# Patient Record
Sex: FEMALE | Race: WHITE | NOT HISPANIC OR LATINO | Employment: UNEMPLOYED | URBAN - METROPOLITAN AREA
[De-identification: names, ages, dates, MRNs, and addresses within clinical notes are randomized per-mention and may not be internally consistent; named-entity substitution may affect disease eponyms.]

---

## 2023-03-07 ENCOUNTER — TELEPHONE (OUTPATIENT)
Dept: PRIMARY CARE | Facility: CLINIC | Age: 63
End: 2023-03-07
Payer: COMMERCIAL

## 2023-03-07 DIAGNOSIS — E03.9 ACQUIRED HYPOTHYROIDISM: ICD-10-CM

## 2023-03-07 DIAGNOSIS — M54.31 BILATERAL SCIATICA: Primary | ICD-10-CM

## 2023-03-07 DIAGNOSIS — M54.32 BILATERAL SCIATICA: Primary | ICD-10-CM

## 2023-03-07 RX ORDER — DULOXETIN HYDROCHLORIDE 60 MG/1
60 CAPSULE, DELAYED RELEASE ORAL DAILY
Qty: 30 CAPSULE | Refills: 2 | Status: SHIPPED | OUTPATIENT
Start: 2023-03-07 | End: 2023-06-06

## 2023-03-07 RX ORDER — DULOXETIN HYDROCHLORIDE 60 MG/1
60 CAPSULE, DELAYED RELEASE ORAL DAILY
COMMUNITY
Start: 2022-05-25 | End: 2023-03-07 | Stop reason: SDUPTHER

## 2023-03-07 RX ORDER — LEVOTHYROXINE SODIUM 88 UG/1
88 TABLET ORAL DAILY
COMMUNITY
Start: 2022-05-25 | End: 2023-03-07 | Stop reason: SDUPTHER

## 2023-03-07 RX ORDER — LEVOTHYROXINE SODIUM 88 UG/1
88 TABLET ORAL DAILY
Qty: 90 TABLET | Refills: 3 | Status: SHIPPED | OUTPATIENT
Start: 2023-03-07 | End: 2024-03-22 | Stop reason: SDUPTHER

## 2023-06-06 DIAGNOSIS — M54.32 BILATERAL SCIATICA: ICD-10-CM

## 2023-06-06 DIAGNOSIS — M54.31 BILATERAL SCIATICA: ICD-10-CM

## 2023-06-06 PROBLEM — M51.369 LUMBAR DEGENERATIVE DISC DISEASE: Status: ACTIVE | Noted: 2023-06-06

## 2023-06-06 PROBLEM — S82.409A CLOSED FRACTURE OF SHAFT OF FIBULA: Status: ACTIVE | Noted: 2023-06-06

## 2023-06-06 PROBLEM — M19.049 OSTEOARTHRITIS OF FINGER: Status: ACTIVE | Noted: 2023-06-06

## 2023-06-06 PROBLEM — M54.12 RIGHT CERVICAL RADICULOPATHY: Status: ACTIVE | Noted: 2023-06-06

## 2023-06-06 PROBLEM — R10.9 ABDOMINAL PAIN: Status: ACTIVE | Noted: 2023-06-06

## 2023-06-06 PROBLEM — M70.42 PREPATELLAR BURSITIS OF LEFT KNEE: Status: ACTIVE | Noted: 2023-06-06

## 2023-06-06 PROBLEM — M54.2 CERVICAL PAIN (NECK): Status: ACTIVE | Noted: 2023-06-06

## 2023-06-06 PROBLEM — K56.7 ILEUS (MULTI): Status: ACTIVE | Noted: 2023-06-06

## 2023-06-06 PROBLEM — B00.1 COLD SORE: Status: ACTIVE | Noted: 2023-06-06

## 2023-06-06 PROBLEM — R51.9 HEADACHE: Status: ACTIVE | Noted: 2023-06-06

## 2023-06-06 PROBLEM — S80.11XA: Status: ACTIVE | Noted: 2023-06-06

## 2023-06-06 PROBLEM — E03.9 HYPOTHYROIDISM: Status: ACTIVE | Noted: 2023-06-06

## 2023-06-06 PROBLEM — F41.9 ANXIETY: Status: ACTIVE | Noted: 2023-06-06

## 2023-06-06 PROBLEM — M51.36 LUMBAR DEGENERATIVE DISC DISEASE: Status: ACTIVE | Noted: 2023-06-06

## 2023-06-06 PROBLEM — R63.4 ABNORMAL WEIGHT LOSS: Status: ACTIVE | Noted: 2023-06-06

## 2023-06-06 PROBLEM — M17.9 OA (OSTEOARTHRITIS) OF KNEE: Status: ACTIVE | Noted: 2023-06-06

## 2023-06-06 PROBLEM — K59.00 CONSTIPATION: Status: ACTIVE | Noted: 2023-06-06

## 2023-06-06 PROBLEM — M54.16 RIGHT LUMBAR RADICULOPATHY: Status: ACTIVE | Noted: 2023-06-06

## 2023-06-06 PROBLEM — M47.812 OSTEOARTHRITIS CERVICAL SPINE: Status: ACTIVE | Noted: 2023-06-06

## 2023-06-06 PROBLEM — J01.90 ACUTE SINUSITIS: Status: ACTIVE | Noted: 2023-06-06

## 2023-06-06 RX ORDER — DULOXETIN HYDROCHLORIDE 60 MG/1
CAPSULE, DELAYED RELEASE ORAL
Qty: 30 CAPSULE | Refills: 2 | Status: SHIPPED | OUTPATIENT
Start: 2023-06-06 | End: 2023-09-20 | Stop reason: SDUPTHER

## 2023-06-06 RX ORDER — PREDNISONE 20 MG/1
20 TABLET ORAL 2 TIMES DAILY
Qty: 10 TABLET | Refills: 0 | COMMUNITY
Start: 2022-07-11 | End: 2022-07-16

## 2023-06-06 RX ORDER — OXYCODONE HYDROCHLORIDE 5 MG/1
CAPSULE ORAL EVERY 6 HOURS
COMMUNITY
Start: 2020-08-05 | End: 2024-04-25 | Stop reason: ALTCHOICE

## 2023-06-06 RX ORDER — CYCLOBENZAPRINE HCL 5 MG
TABLET ORAL
COMMUNITY
Start: 2022-07-11 | End: 2024-04-25 | Stop reason: ALTCHOICE

## 2023-06-06 RX ORDER — BUTALBITAL, ACETAMINOPHEN AND CAFFEINE 300; 40; 50 MG/1; MG/1; MG/1
CAPSULE ORAL
COMMUNITY
Start: 2016-11-14 | End: 2024-04-25 | Stop reason: ALTCHOICE

## 2023-06-06 RX ORDER — IBUPROFEN 600 MG/1
TABLET ORAL
COMMUNITY
Start: 2018-12-12

## 2023-06-06 RX ORDER — DOCUSATE SODIUM 100 MG/1
CAPSULE, LIQUID FILLED ORAL 2 TIMES DAILY
COMMUNITY
Start: 2020-08-05

## 2023-06-06 RX ORDER — GABAPENTIN 600 MG/1
TABLET ORAL
COMMUNITY
End: 2023-08-11 | Stop reason: SDUPTHER

## 2023-06-06 RX ORDER — SENNOSIDES 8.6 MG/1
TABLET ORAL
COMMUNITY
Start: 2020-08-05 | End: 2024-04-25 | Stop reason: ALTCHOICE

## 2023-08-11 DIAGNOSIS — M54.16 RIGHT LUMBAR RADICULOPATHY: Primary | ICD-10-CM

## 2023-08-11 RX ORDER — GABAPENTIN 600 MG/1
TABLET ORAL
Qty: 15 TABLET | Refills: 0 | Status: SHIPPED | OUTPATIENT
Start: 2023-08-11 | End: 2023-08-24 | Stop reason: SDUPTHER

## 2023-08-24 DIAGNOSIS — M54.16 RIGHT LUMBAR RADICULOPATHY: ICD-10-CM

## 2023-08-24 RX ORDER — GABAPENTIN 600 MG/1
TABLET ORAL
Qty: 150 TABLET | Refills: 1 | Status: SHIPPED | OUTPATIENT
Start: 2023-08-24 | End: 2023-10-16 | Stop reason: SDUPTHER

## 2023-09-20 DIAGNOSIS — M54.32 BILATERAL SCIATICA: ICD-10-CM

## 2023-09-20 DIAGNOSIS — M54.31 BILATERAL SCIATICA: ICD-10-CM

## 2023-09-20 RX ORDER — DULOXETIN HYDROCHLORIDE 60 MG/1
60 CAPSULE, DELAYED RELEASE ORAL DAILY
Qty: 90 CAPSULE | Refills: 1 | Status: SHIPPED | OUTPATIENT
Start: 2023-09-20 | End: 2024-03-22 | Stop reason: SDUPTHER

## 2023-10-16 DIAGNOSIS — M54.16 RIGHT LUMBAR RADICULOPATHY: ICD-10-CM

## 2023-10-16 RX ORDER — GABAPENTIN 600 MG/1
TABLET ORAL
Qty: 150 TABLET | Refills: 1 | Status: SHIPPED | OUTPATIENT
Start: 2023-10-16 | End: 2023-12-11 | Stop reason: SDUPTHER

## 2023-12-11 DIAGNOSIS — M54.16 RIGHT LUMBAR RADICULOPATHY: ICD-10-CM

## 2023-12-11 RX ORDER — GABAPENTIN 600 MG/1
TABLET ORAL
Qty: 150 TABLET | Refills: 1 | Status: SHIPPED | OUTPATIENT
Start: 2023-12-11 | End: 2024-02-08 | Stop reason: SDUPTHER

## 2024-02-08 DIAGNOSIS — M54.16 RIGHT LUMBAR RADICULOPATHY: ICD-10-CM

## 2024-02-09 RX ORDER — GABAPENTIN 600 MG/1
TABLET ORAL
Qty: 150 TABLET | Refills: 1 | Status: SHIPPED | OUTPATIENT
Start: 2024-02-09 | End: 2024-04-10 | Stop reason: SDUPTHER

## 2024-03-22 ENCOUNTER — TELEPHONE (OUTPATIENT)
Dept: PRIMARY CARE | Facility: CLINIC | Age: 64
End: 2024-03-22
Payer: COMMERCIAL

## 2024-03-22 DIAGNOSIS — M54.31 BILATERAL SCIATICA: ICD-10-CM

## 2024-03-22 DIAGNOSIS — M54.32 BILATERAL SCIATICA: ICD-10-CM

## 2024-03-22 DIAGNOSIS — E03.9 ACQUIRED HYPOTHYROIDISM: ICD-10-CM

## 2024-03-22 RX ORDER — DULOXETIN HYDROCHLORIDE 60 MG/1
60 CAPSULE, DELAYED RELEASE ORAL DAILY
Qty: 90 CAPSULE | Refills: 0 | Status: SHIPPED | OUTPATIENT
Start: 2024-03-22 | End: 2025-03-22

## 2024-03-22 RX ORDER — LEVOTHYROXINE SODIUM 88 UG/1
88 TABLET ORAL DAILY
Qty: 90 TABLET | Refills: 0 | Status: SHIPPED | OUTPATIENT
Start: 2024-03-22 | End: 2024-04-26 | Stop reason: SDUPTHER

## 2024-03-22 NOTE — TELEPHONE ENCOUNTER
I refilled the two meds. However pt has no showed with use multiple times, and has not plan to see us again. No further refills after the 90 days. Pt will need to find a new doctor

## 2024-04-10 DIAGNOSIS — M54.16 RIGHT LUMBAR RADICULOPATHY: ICD-10-CM

## 2024-04-11 RX ORDER — GABAPENTIN 600 MG/1
TABLET ORAL
Qty: 150 TABLET | Refills: 0 | Status: SHIPPED | OUTPATIENT
Start: 2024-04-11 | End: 2024-05-09 | Stop reason: SDUPTHER

## 2024-04-25 ENCOUNTER — LAB (OUTPATIENT)
Dept: LAB | Facility: LAB | Age: 64
End: 2024-04-25
Payer: MEDICAID

## 2024-04-25 ENCOUNTER — OFFICE VISIT (OUTPATIENT)
Dept: PRIMARY CARE | Facility: CLINIC | Age: 64
End: 2024-04-25
Payer: MEDICAID

## 2024-04-25 VITALS
TEMPERATURE: 97.7 F | OXYGEN SATURATION: 96 % | SYSTOLIC BLOOD PRESSURE: 134 MMHG | BODY MASS INDEX: 19.93 KG/M2 | HEART RATE: 78 BPM | WEIGHT: 124 LBS | DIASTOLIC BLOOD PRESSURE: 68 MMHG | HEIGHT: 66 IN

## 2024-04-25 DIAGNOSIS — E03.9 HYPOTHYROIDISM, UNSPECIFIED TYPE: ICD-10-CM

## 2024-04-25 DIAGNOSIS — Z00.00 PHYSICAL EXAM: ICD-10-CM

## 2024-04-25 DIAGNOSIS — Z12.31 VISIT FOR SCREENING MAMMOGRAM: ICD-10-CM

## 2024-04-25 DIAGNOSIS — Z12.2 ENCOUNTER FOR SCREENING FOR LUNG CANCER: ICD-10-CM

## 2024-04-25 DIAGNOSIS — Z12.31 ENCOUNTER FOR SCREENING MAMMOGRAM FOR MALIGNANT NEOPLASM OF BREAST: ICD-10-CM

## 2024-04-25 DIAGNOSIS — R74.8 ELEVATED ALKALINE PHOSPHATASE LEVEL: ICD-10-CM

## 2024-04-25 DIAGNOSIS — R77.9 ELEVATED BLOOD PROTEIN: ICD-10-CM

## 2024-04-25 DIAGNOSIS — Z12.11 SCREENING FOR COLON CANCER: ICD-10-CM

## 2024-04-25 DIAGNOSIS — Z00.00 PHYSICAL EXAM: Primary | ICD-10-CM

## 2024-04-25 DIAGNOSIS — F17.210 HEAVY CIGARETTE SMOKER: ICD-10-CM

## 2024-04-25 DIAGNOSIS — K31.89 GASTRIC WALL THICKENING: ICD-10-CM

## 2024-04-25 PROBLEM — F43.23 ADJUSTMENT DISORDER WITH MIXED ANXIETY AND DEPRESSED MOOD: Status: ACTIVE | Noted: 2023-08-15

## 2024-04-25 PROCEDURE — 84439 ASSAY OF FREE THYROXINE: CPT

## 2024-04-25 PROCEDURE — 36415 COLL VENOUS BLD VENIPUNCTURE: CPT

## 2024-04-25 PROCEDURE — 84165 PROTEIN E-PHORESIS SERUM: CPT

## 2024-04-25 PROCEDURE — 84165 PROTEIN E-PHORESIS SERUM: CPT | Performed by: INTERNAL MEDICINE

## 2024-04-25 PROCEDURE — 1159F MED LIST DOCD IN RCRD: CPT | Performed by: INTERNAL MEDICINE

## 2024-04-25 PROCEDURE — 84443 ASSAY THYROID STIM HORMONE: CPT

## 2024-04-25 PROCEDURE — 86334 IMMUNOFIX E-PHORESIS SERUM: CPT

## 2024-04-25 PROCEDURE — 1123F ACP DISCUSS/DSCN MKR DOCD: CPT | Performed by: INTERNAL MEDICINE

## 2024-04-25 PROCEDURE — 83036 HEMOGLOBIN GLYCOSYLATED A1C: CPT

## 2024-04-25 PROCEDURE — 1158F ADVNC CARE PLAN TLK DOCD: CPT | Performed by: INTERNAL MEDICINE

## 2024-04-25 PROCEDURE — 80053 COMPREHEN METABOLIC PANEL: CPT

## 2024-04-25 PROCEDURE — 1157F ADVNC CARE PLAN IN RCRD: CPT | Performed by: INTERNAL MEDICINE

## 2024-04-25 PROCEDURE — 99214 OFFICE O/P EST MOD 30 MIN: CPT | Performed by: INTERNAL MEDICINE

## 2024-04-25 PROCEDURE — 85027 COMPLETE CBC AUTOMATED: CPT

## 2024-04-25 PROCEDURE — 86320 SERUM IMMUNOELECTROPHORESIS: CPT | Performed by: INTERNAL MEDICINE

## 2024-04-25 NOTE — PROGRESS NOTES
"SUBJECTIVE:   Barbara Sow is a 64 y.o. female presenting for her annual physical/wellness.  PCP: Maninder Christensen MD  Physical.  Overdue for Follow up and for labs.  Since a cold and sinus infection that occurred 6 weeks ago, she has been feeling short of breath. No chest pain. No leg edema.  She is a chronic on and off smoker. Over 20 pack years. Currently smoking half pack per day.  Last year when she was in ER, had ct abdomen that showed gastric thickness. She has not had any Follow up test.  Due for Colonoscopy.   Last pap: na  Last mammogram: due  Vaccines pt has medicaid, we cannot give any vaccines.  Diet:   healthy in general  Exercises:  regularly  No Weight loss. The weight in 2022 was in accurate per pt. She is usually in 120 lb.  Denied loss of appetite. No diarrhea, not UTI symptoms. No black stool or rectal bleeding.    No results found for: \"HGBA1C\"  Lab Results   Component Value Date    CREATININE 0.64 05/21/2023     Lab Results   Component Value Date    WBC 10.9 05/21/2023    HGB 11.6 (L) 05/21/2023    HCT 37.0 05/21/2023    MCV 90 05/21/2023     (H) 05/21/2023     Lab Results   Component Value Date    CHOL 241 (H) 08/23/2022     Lab Results   Component Value Date    HDL 36.8 (A) 08/23/2022     No results found for: \"LDLCALC\"  No results found for: \"TRIG\"  No components found for: \"CHOLHDL\"       ROS:   otherwise Feeling well. No dyspnea or chest pain on exertion. No abdominal pain, change in bowel habits, black or bloody stools. No urinary tract or  symptoms.  No breast concerns. No neurological complaints.    OBJECTIVE:   The patient appears well, alert, oriented x 3, in no distress.   /68   Pulse 78   Temp 36.5 °C (97.7 °F)   Ht 1.676 m (5' 6\")   Wt 56.2 kg (124 lb)   SpO2 96%   BMI 20.01 kg/m²   ENT normal.  Neck supple. No adenopathy or thyromegaly. SID. Lungs are clear, good air entry, no wheezes, rhonchi or rales. S1 and S2 normal, no murmurs, regular rate and " rhythm. Abdomen is soft without tenderness, guarding, mass or organomegaly.  exam deferred to Gyn. Extremities show no edema, normal peripheral pulses. Neurological is normal without focal findings.      1. Physical exam  CBC, Comprehensive Metabolic Panel, Hemoglobin A1C      2. Visit for screening mammogram        3. Gastric wall thickening  EGD      4. Encounter for screening mammogram for malignant neoplasm of breast  BI mammo bilateral screening tomosynthesis      5. Screening for colon cancer  Colonoscopy Screening; Average Risk Patient      6. Heavy cigarette smoker  CT lung screening low dose      7. Encounter for screening for lung cancer  CT lung screening low dose      8. Hypothyroidism, unspecified type  TSH with reflex to Free T4 if abnormal        Follow up 6 months

## 2024-04-26 DIAGNOSIS — E03.9 ACQUIRED HYPOTHYROIDISM: ICD-10-CM

## 2024-04-26 DIAGNOSIS — R74.8 ELEVATED ALKALINE PHOSPHATASE LEVEL: Primary | ICD-10-CM

## 2024-04-26 DIAGNOSIS — R77.9 ELEVATED BLOOD PROTEIN: ICD-10-CM

## 2024-04-26 LAB
ALBUMIN SERPL BCP-MCNC: 4.8 G/DL (ref 3.4–5)
ALP SERPL-CCNC: 144 U/L (ref 33–136)
ALT SERPL W P-5'-P-CCNC: 22 U/L (ref 7–45)
ANION GAP SERPL CALC-SCNC: 19 MMOL/L (ref 10–20)
AST SERPL W P-5'-P-CCNC: 17 U/L (ref 9–39)
BILIRUB SERPL-MCNC: 0.3 MG/DL (ref 0–1.2)
BUN SERPL-MCNC: 17 MG/DL (ref 6–23)
CALCIUM SERPL-MCNC: 10.2 MG/DL (ref 8.6–10.6)
CHLORIDE SERPL-SCNC: 101 MMOL/L (ref 98–107)
CO2 SERPL-SCNC: 23 MMOL/L (ref 21–32)
CREAT SERPL-MCNC: 0.7 MG/DL (ref 0.5–1.05)
EGFRCR SERPLBLD CKD-EPI 2021: >90 ML/MIN/1.73M*2
ERYTHROCYTE [DISTWIDTH] IN BLOOD BY AUTOMATED COUNT: 18.4 % (ref 11.5–14.5)
EST. AVERAGE GLUCOSE BLD GHB EST-MCNC: 114 MG/DL
GLUCOSE SERPL-MCNC: 84 MG/DL (ref 74–99)
HBA1C MFR BLD: 5.6 %
HCT VFR BLD AUTO: 41.4 % (ref 36–46)
HGB BLD-MCNC: 13.3 G/DL (ref 12–16)
MCH RBC QN AUTO: 30.9 PG (ref 26–34)
MCHC RBC AUTO-ENTMCNC: 32.1 G/DL (ref 32–36)
MCV RBC AUTO: 96 FL (ref 80–100)
NRBC BLD-RTO: 0 /100 WBCS (ref 0–0)
PLATELET # BLD AUTO: 551 X10*3/UL (ref 150–450)
POTASSIUM SERPL-SCNC: 4.4 MMOL/L (ref 3.5–5.3)
PROT SERPL-MCNC: 8.6 G/DL (ref 6.4–8.2)
PROT SERPL-MCNC: 9 G/DL (ref 6.4–8.2)
RBC # BLD AUTO: 4.31 X10*6/UL (ref 4–5.2)
SODIUM SERPL-SCNC: 139 MMOL/L (ref 136–145)
T4 FREE SERPL-MCNC: 1.06 NG/DL (ref 0.78–1.48)
TSH SERPL-ACNC: 17.82 MIU/L (ref 0.44–3.98)
WBC # BLD AUTO: 9 X10*3/UL (ref 4.4–11.3)

## 2024-04-26 RX ORDER — LEVOTHYROXINE SODIUM 88 UG/1
TABLET ORAL
Start: 2024-04-26

## 2024-04-26 NOTE — PROGRESS NOTES
Added Alk phos fractions due to elevated alk phos  Added SPEP due to elevated total protein.  Adjusted levothyroxine dose, 88mcg 7tabs to day, to 8 tabs per day, repeat tsh in 3 months

## 2024-05-02 LAB
ALBUMIN: 4.6 G/DL (ref 3.4–5)
ALPHA 1 GLOBULIN: 0.4 G/DL (ref 0.2–0.6)
ALPHA 2 GLOBULIN: 1.1 G/DL (ref 0.4–1.1)
BETA GLOBULIN: 0.9 G/DL (ref 0.5–1.2)
GAMMA GLOBULIN: 1.6 G/DL (ref 0.5–1.4)
IMMUNOFIXATION COMMENT: NORMAL
M-PROTEIN 1: 0.7 G/DL
PATH REVIEW - SERUM IMMUNOFIXATION: NORMAL
PATH REVIEW-SERUM PROTEIN ELECTROPHORESIS: ABNORMAL
PROTEIN ELECTROPHORESIS COMMENT: ABNORMAL

## 2024-05-09 DIAGNOSIS — M54.16 RIGHT LUMBAR RADICULOPATHY: ICD-10-CM

## 2024-05-09 RX ORDER — GABAPENTIN 600 MG/1
TABLET ORAL
Qty: 150 TABLET | Refills: 11 | Status: SHIPPED | OUTPATIENT
Start: 2024-05-09

## 2024-05-10 DIAGNOSIS — D75.839 THROMBOCYTOSIS: ICD-10-CM

## 2024-05-10 DIAGNOSIS — D64.9 ANEMIA, UNSPECIFIED TYPE: ICD-10-CM

## 2024-05-10 DIAGNOSIS — R77.8 ABNORMAL SERUM PROTEIN ELECTROPHORESIS: Primary | ICD-10-CM

## 2024-07-08 DIAGNOSIS — M54.31 BILATERAL SCIATICA: ICD-10-CM

## 2024-07-08 DIAGNOSIS — M54.32 BILATERAL SCIATICA: ICD-10-CM

## 2024-07-08 DIAGNOSIS — E03.9 ACQUIRED HYPOTHYROIDISM: ICD-10-CM

## 2024-07-08 RX ORDER — DULOXETIN HYDROCHLORIDE 60 MG/1
60 CAPSULE, DELAYED RELEASE ORAL DAILY
Qty: 90 CAPSULE | Refills: 0 | Status: SHIPPED | OUTPATIENT
Start: 2024-07-08 | End: 2025-07-08

## 2024-07-08 RX ORDER — LEVOTHYROXINE SODIUM 88 UG/1
TABLET ORAL
Qty: 90 TABLET | Refills: 1 | Status: SHIPPED | OUTPATIENT
Start: 2024-07-08

## 2024-07-18 ENCOUNTER — HOSPITAL ENCOUNTER (INPATIENT)
Facility: HOSPITAL | Age: 64
End: 2024-07-18
Attending: EMERGENCY MEDICINE | Admitting: SURGERY
Payer: MEDICARE

## 2024-07-18 ENCOUNTER — APPOINTMENT (OUTPATIENT)
Dept: RADIOLOGY | Facility: HOSPITAL | Age: 64
End: 2024-07-18
Payer: MEDICARE

## 2024-07-18 ENCOUNTER — APPOINTMENT (OUTPATIENT)
Dept: CARDIOLOGY | Facility: HOSPITAL | Age: 64
End: 2024-07-18
Payer: MEDICARE

## 2024-07-18 DIAGNOSIS — K56.2 INTESTINAL VOLVULUS (MULTI): ICD-10-CM

## 2024-07-18 DIAGNOSIS — K56.609 LARGE BOWEL OBSTRUCTION (MULTI): Primary | ICD-10-CM

## 2024-07-18 DIAGNOSIS — Z74.09 MOBILITY IMPAIRED: ICD-10-CM

## 2024-07-18 DIAGNOSIS — R10.9 ABDOMINAL PAIN: ICD-10-CM

## 2024-07-18 LAB
ALBUMIN SERPL BCP-MCNC: 4.3 G/DL (ref 3.4–5)
ALP SERPL-CCNC: 100 U/L (ref 33–136)
ALT SERPL W P-5'-P-CCNC: 14 U/L (ref 7–45)
ANION GAP SERPL CALC-SCNC: 15 MMOL/L (ref 10–20)
ANION GAP SERPL CALC-SCNC: 17 MMOL/L (ref 10–20)
APPEARANCE UR: CLEAR
APPEARANCE UR: CLEAR
AST SERPL W P-5'-P-CCNC: 37 U/L (ref 9–39)
BASOPHILS # BLD AUTO: 0.21 X10*3/UL (ref 0–0.1)
BASOPHILS # BLD AUTO: 0.23 X10*3/UL (ref 0–0.1)
BASOPHILS NFR BLD AUTO: 1.8 %
BASOPHILS NFR BLD AUTO: 1.9 %
BILIRUB SERPL-MCNC: 0.3 MG/DL (ref 0–1.2)
BILIRUB UR STRIP.AUTO-MCNC: NEGATIVE MG/DL
BILIRUB UR STRIP.AUTO-MCNC: NEGATIVE MG/DL
BUN SERPL-MCNC: 23 MG/DL (ref 6–23)
BUN SERPL-MCNC: 25 MG/DL (ref 6–23)
CALCIUM SERPL-MCNC: 9.5 MG/DL (ref 8.6–10.3)
CALCIUM SERPL-MCNC: 9.8 MG/DL (ref 8.6–10.3)
CARDIAC TROPONIN I PNL SERPL HS: 6 NG/L (ref 0–13)
CHLORIDE SERPL-SCNC: 96 MMOL/L (ref 98–107)
CHLORIDE SERPL-SCNC: 98 MMOL/L (ref 98–107)
CO2 SERPL-SCNC: 21 MMOL/L (ref 21–32)
CO2 SERPL-SCNC: 21 MMOL/L (ref 21–32)
COLOR UR: YELLOW
COLOR UR: YELLOW
CREAT SERPL-MCNC: 0.7 MG/DL (ref 0.5–1.05)
CREAT SERPL-MCNC: 0.94 MG/DL (ref 0.5–1.05)
EGFRCR SERPLBLD CKD-EPI 2021: 67 ML/MIN/1.73M*2
EGFRCR SERPLBLD CKD-EPI 2021: >90 ML/MIN/1.73M*2
EOSINOPHIL # BLD AUTO: 0.58 X10*3/UL (ref 0–0.7)
EOSINOPHIL # BLD AUTO: 0.64 X10*3/UL (ref 0–0.7)
EOSINOPHIL NFR BLD AUTO: 4.9 %
EOSINOPHIL NFR BLD AUTO: 5.3 %
ERYTHROCYTE [DISTWIDTH] IN BLOOD BY AUTOMATED COUNT: 15 % (ref 11.5–14.5)
ERYTHROCYTE [DISTWIDTH] IN BLOOD BY AUTOMATED COUNT: 15 % (ref 11.5–14.5)
GLUCOSE SERPL-MCNC: 73 MG/DL (ref 74–99)
GLUCOSE SERPL-MCNC: 83 MG/DL (ref 74–99)
GLUCOSE UR STRIP.AUTO-MCNC: NORMAL MG/DL
GLUCOSE UR STRIP.AUTO-MCNC: NORMAL MG/DL
HCT VFR BLD AUTO: 34.8 % (ref 36–46)
HCT VFR BLD AUTO: 37 % (ref 36–46)
HGB BLD-MCNC: 11.2 G/DL (ref 12–16)
HGB BLD-MCNC: 11.8 G/DL (ref 12–16)
HOLD SPECIMEN: NORMAL
HYALINE CASTS #/AREA URNS AUTO: ABNORMAL /LPF
IMM GRANULOCYTES # BLD AUTO: 0.04 X10*3/UL (ref 0–0.7)
IMM GRANULOCYTES # BLD AUTO: 0.04 X10*3/UL (ref 0–0.7)
IMM GRANULOCYTES NFR BLD AUTO: 0.3 % (ref 0–0.9)
IMM GRANULOCYTES NFR BLD AUTO: 0.3 % (ref 0–0.9)
KETONES UR STRIP.AUTO-MCNC: ABNORMAL MG/DL
KETONES UR STRIP.AUTO-MCNC: ABNORMAL MG/DL
LACTATE SERPL-SCNC: 1 MMOL/L (ref 0.4–2)
LEUKOCYTE ESTERASE UR QL STRIP.AUTO: ABNORMAL
LEUKOCYTE ESTERASE UR QL STRIP.AUTO: NEGATIVE
LIPASE SERPL-CCNC: 11 U/L (ref 9–82)
LYMPHOCYTES # BLD AUTO: 1.91 X10*3/UL (ref 1.2–4.8)
LYMPHOCYTES # BLD AUTO: 1.95 X10*3/UL (ref 1.2–4.8)
LYMPHOCYTES NFR BLD AUTO: 15.8 %
LYMPHOCYTES NFR BLD AUTO: 16.6 %
MAGNESIUM SERPL-MCNC: 2.07 MG/DL (ref 1.6–2.4)
MAGNESIUM SERPL-MCNC: 2.39 MG/DL (ref 1.6–2.4)
MCH RBC QN AUTO: 29.1 PG (ref 26–34)
MCH RBC QN AUTO: 29.8 PG (ref 26–34)
MCHC RBC AUTO-ENTMCNC: 31.9 G/DL (ref 32–36)
MCHC RBC AUTO-ENTMCNC: 32.2 G/DL (ref 32–36)
MCV RBC AUTO: 91 FL (ref 80–100)
MCV RBC AUTO: 93 FL (ref 80–100)
MONOCYTES # BLD AUTO: 0.54 X10*3/UL (ref 0.1–1)
MONOCYTES # BLD AUTO: 0.66 X10*3/UL (ref 0.1–1)
MONOCYTES NFR BLD AUTO: 4.6 %
MONOCYTES NFR BLD AUTO: 5.5 %
MUCOUS THREADS #/AREA URNS AUTO: ABNORMAL /LPF
MUCOUS THREADS #/AREA URNS AUTO: NORMAL /LPF
NEUTROPHILS # BLD AUTO: 8.4 X10*3/UL (ref 1.2–7.7)
NEUTROPHILS # BLD AUTO: 8.6 X10*3/UL (ref 1.2–7.7)
NEUTROPHILS NFR BLD AUTO: 71.2 %
NEUTROPHILS NFR BLD AUTO: 71.8 %
NITRITE UR QL STRIP.AUTO: NEGATIVE
NITRITE UR QL STRIP.AUTO: NEGATIVE
NRBC BLD-RTO: 0 /100 WBCS (ref 0–0)
NRBC BLD-RTO: 0 /100 WBCS (ref 0–0)
PH UR STRIP.AUTO: 5.5 [PH]
PH UR STRIP.AUTO: 5.5 [PH]
PHOSPHATE SERPL-MCNC: 4.4 MG/DL (ref 2.5–4.9)
PLATELET # BLD AUTO: 623 X10*3/UL (ref 150–450)
PLATELET # BLD AUTO: 660 X10*3/UL (ref 150–450)
POTASSIUM SERPL-SCNC: 4.1 MMOL/L (ref 3.5–5.3)
POTASSIUM SERPL-SCNC: 5.8 MMOL/L (ref 3.5–5.3)
PROT SERPL-MCNC: 7.8 G/DL (ref 6.4–8.2)
PROT UR STRIP.AUTO-MCNC: ABNORMAL MG/DL
PROT UR STRIP.AUTO-MCNC: ABNORMAL MG/DL
RBC # BLD AUTO: 3.76 X10*6/UL (ref 4–5.2)
RBC # BLD AUTO: 4.05 X10*6/UL (ref 4–5.2)
RBC # UR STRIP.AUTO: NEGATIVE /UL
RBC # UR STRIP.AUTO: NEGATIVE /UL
RBC #/AREA URNS AUTO: ABNORMAL /HPF
RBC #/AREA URNS AUTO: NORMAL /HPF
SODIUM SERPL-SCNC: 128 MMOL/L (ref 136–145)
SODIUM SERPL-SCNC: 130 MMOL/L (ref 136–145)
SP GR UR STRIP.AUTO: 1.02
SP GR UR STRIP.AUTO: 1.03
SQUAMOUS #/AREA URNS AUTO: ABNORMAL /HPF
SQUAMOUS #/AREA URNS AUTO: NORMAL /HPF
T4 FREE SERPL-MCNC: 0.38 NG/DL (ref 0.61–1.12)
TSH SERPL-ACNC: 50 MIU/L (ref 0.44–3.98)
UROBILINOGEN UR STRIP.AUTO-MCNC: ABNORMAL MG/DL
UROBILINOGEN UR STRIP.AUTO-MCNC: NORMAL MG/DL
WBC # BLD AUTO: 11.7 X10*3/UL (ref 4.4–11.3)
WBC # BLD AUTO: 12.1 X10*3/UL (ref 4.4–11.3)
WBC #/AREA URNS AUTO: ABNORMAL /HPF
WBC #/AREA URNS AUTO: NORMAL /HPF

## 2024-07-18 PROCEDURE — 99222 1ST HOSP IP/OBS MODERATE 55: CPT | Performed by: NURSE PRACTITIONER

## 2024-07-18 PROCEDURE — 2500000001 HC RX 250 WO HCPCS SELF ADMINISTERED DRUGS (ALT 637 FOR MEDICARE OP): Performed by: SURGERY

## 2024-07-18 PROCEDURE — 85025 COMPLETE CBC W/AUTO DIFF WBC: CPT | Performed by: PHYSICIAN ASSISTANT

## 2024-07-18 PROCEDURE — 85025 COMPLETE CBC W/AUTO DIFF WBC: CPT | Performed by: SURGERY

## 2024-07-18 PROCEDURE — 99223 1ST HOSP IP/OBS HIGH 75: CPT | Performed by: SURGERY

## 2024-07-18 PROCEDURE — 84439 ASSAY OF FREE THYROXINE: CPT | Performed by: SURGERY

## 2024-07-18 PROCEDURE — 84484 ASSAY OF TROPONIN QUANT: CPT | Performed by: PHYSICIAN ASSISTANT

## 2024-07-18 PROCEDURE — 84100 ASSAY OF PHOSPHORUS: CPT | Performed by: SURGERY

## 2024-07-18 PROCEDURE — 2500000004 HC RX 250 GENERAL PHARMACY W/ HCPCS (ALT 636 FOR OP/ED): Performed by: PHYSICIAN ASSISTANT

## 2024-07-18 PROCEDURE — 81001 URINALYSIS AUTO W/SCOPE: CPT | Performed by: SURGERY

## 2024-07-18 PROCEDURE — 93005 ELECTROCARDIOGRAM TRACING: CPT

## 2024-07-18 PROCEDURE — 2060000001 HC INTERMEDIATE ICU ROOM DAILY

## 2024-07-18 PROCEDURE — 84075 ASSAY ALKALINE PHOSPHATASE: CPT | Performed by: PHYSICIAN ASSISTANT

## 2024-07-18 PROCEDURE — 83690 ASSAY OF LIPASE: CPT | Performed by: PHYSICIAN ASSISTANT

## 2024-07-18 PROCEDURE — 2500000004 HC RX 250 GENERAL PHARMACY W/ HCPCS (ALT 636 FOR OP/ED): Performed by: SURGERY

## 2024-07-18 PROCEDURE — 96375 TX/PRO/DX INJ NEW DRUG ADDON: CPT

## 2024-07-18 PROCEDURE — 84443 ASSAY THYROID STIM HORMONE: CPT | Performed by: SURGERY

## 2024-07-18 PROCEDURE — 83735 ASSAY OF MAGNESIUM: CPT | Performed by: NURSE PRACTITIONER

## 2024-07-18 PROCEDURE — 81001 URINALYSIS AUTO W/SCOPE: CPT | Performed by: PHYSICIAN ASSISTANT

## 2024-07-18 PROCEDURE — 74177 CT ABD & PELVIS W/CONTRAST: CPT | Performed by: RADIOLOGY

## 2024-07-18 PROCEDURE — 99285 EMERGENCY DEPT VISIT HI MDM: CPT | Mod: 25

## 2024-07-18 PROCEDURE — 2500000004 HC RX 250 GENERAL PHARMACY W/ HCPCS (ALT 636 FOR OP/ED): Performed by: EMERGENCY MEDICINE

## 2024-07-18 PROCEDURE — 36415 COLL VENOUS BLD VENIPUNCTURE: CPT | Performed by: SURGERY

## 2024-07-18 PROCEDURE — 83605 ASSAY OF LACTIC ACID: CPT | Performed by: PHYSICIAN ASSISTANT

## 2024-07-18 PROCEDURE — 96361 HYDRATE IV INFUSION ADD-ON: CPT

## 2024-07-18 PROCEDURE — 2550000001 HC RX 255 CONTRASTS: Performed by: EMERGENCY MEDICINE

## 2024-07-18 PROCEDURE — 74177 CT ABD & PELVIS W/CONTRAST: CPT

## 2024-07-18 PROCEDURE — 80048 BASIC METABOLIC PNL TOTAL CA: CPT | Mod: CCI | Performed by: SURGERY

## 2024-07-18 PROCEDURE — 96376 TX/PRO/DX INJ SAME DRUG ADON: CPT

## 2024-07-18 PROCEDURE — 87086 URINE CULTURE/COLONY COUNT: CPT | Mod: PORLAB | Performed by: PHYSICIAN ASSISTANT

## 2024-07-18 PROCEDURE — 36415 COLL VENOUS BLD VENIPUNCTURE: CPT | Performed by: PHYSICIAN ASSISTANT

## 2024-07-18 PROCEDURE — 83735 ASSAY OF MAGNESIUM: CPT | Performed by: SURGERY

## 2024-07-18 PROCEDURE — 96374 THER/PROPH/DIAG INJ IV PUSH: CPT | Mod: 59

## 2024-07-18 PROCEDURE — 2500000001 HC RX 250 WO HCPCS SELF ADMINISTERED DRUGS (ALT 637 FOR MEDICARE OP): Performed by: NURSE PRACTITIONER

## 2024-07-18 RX ORDER — IBUPROFEN 200 MG
1 TABLET ORAL DAILY
Status: DISCONTINUED | OUTPATIENT
Start: 2024-07-18 | End: 2024-07-25 | Stop reason: HOSPADM

## 2024-07-18 RX ORDER — NICOTINE 7MG/24HR
1 PATCH, TRANSDERMAL 24 HOURS TRANSDERMAL DAILY
Status: DISCONTINUED | OUTPATIENT
Start: 2024-09-12 | End: 2024-07-25 | Stop reason: HOSPADM

## 2024-07-18 RX ORDER — GABAPENTIN 600 MG/1
1200 TABLET ORAL NIGHTLY
Status: DISCONTINUED | OUTPATIENT
Start: 2024-07-18 | End: 2024-07-19

## 2024-07-18 RX ORDER — ONDANSETRON HYDROCHLORIDE 2 MG/ML
4 INJECTION, SOLUTION INTRAVENOUS EVERY 8 HOURS PRN
Status: DISCONTINUED | OUTPATIENT
Start: 2024-07-18 | End: 2024-07-25 | Stop reason: HOSPADM

## 2024-07-18 RX ORDER — IBUPROFEN 200 MG
1 TABLET ORAL DAILY
Status: DISCONTINUED | OUTPATIENT
Start: 2024-08-29 | End: 2024-07-25 | Stop reason: HOSPADM

## 2024-07-18 RX ORDER — MORPHINE SULFATE 4 MG/ML
4 INJECTION INTRAVENOUS ONCE
Status: COMPLETED | OUTPATIENT
Start: 2024-07-18 | End: 2024-07-18

## 2024-07-18 RX ORDER — GABAPENTIN 600 MG/1
TABLET ORAL
COMMUNITY
End: 2024-08-01 | Stop reason: SDUPTHER

## 2024-07-18 RX ORDER — GABAPENTIN 600 MG/1
1200 TABLET ORAL DAILY
Status: DISCONTINUED | OUTPATIENT
Start: 2024-07-18 | End: 2024-07-19

## 2024-07-18 RX ORDER — MORPHINE SULFATE 4 MG/ML
4 INJECTION INTRAVENOUS EVERY 6 HOURS PRN
Status: DISCONTINUED | OUTPATIENT
Start: 2024-07-18 | End: 2024-07-19

## 2024-07-18 RX ORDER — LEVOTHYROXINE SODIUM 88 UG/1
88 TABLET ORAL DAILY
Status: DISCONTINUED | OUTPATIENT
Start: 2024-07-19 | End: 2024-07-19

## 2024-07-18 RX ORDER — LEVOTHYROXINE SODIUM 88 UG/1
88 TABLET ORAL DAILY
COMMUNITY
End: 2024-08-01 | Stop reason: SDUPTHER

## 2024-07-18 RX ORDER — DEXTROMETHORPHAN POLISTIREX 30 MG/5 ML
1 SUSPENSION, EXTENDED RELEASE 12 HR ORAL ONCE
Status: COMPLETED | OUTPATIENT
Start: 2024-07-18 | End: 2024-07-18

## 2024-07-18 RX ORDER — GLYCERIN 1 G/1
1 SUPPOSITORY RECTAL ONCE
Status: COMPLETED | OUTPATIENT
Start: 2024-07-18 | End: 2024-07-18

## 2024-07-18 RX ORDER — DULOXETIN HYDROCHLORIDE 60 MG/1
60 CAPSULE, DELAYED RELEASE ORAL DAILY
COMMUNITY

## 2024-07-18 RX ORDER — ONDANSETRON HYDROCHLORIDE 2 MG/ML
4 INJECTION, SOLUTION INTRAVENOUS ONCE
Status: COMPLETED | OUTPATIENT
Start: 2024-07-18 | End: 2024-07-18

## 2024-07-18 RX ORDER — GABAPENTIN 600 MG/1
600 TABLET ORAL 2 TIMES DAILY
Status: DISCONTINUED | OUTPATIENT
Start: 2024-07-18 | End: 2024-07-18

## 2024-07-18 RX ORDER — MORPHINE SULFATE 2 MG/ML
2 INJECTION, SOLUTION INTRAMUSCULAR; INTRAVENOUS EVERY 6 HOURS PRN
Status: DISCONTINUED | OUTPATIENT
Start: 2024-07-18 | End: 2024-07-19

## 2024-07-18 RX ORDER — DEXTROSE MONOHYDRATE AND SODIUM CHLORIDE 5; .9 G/100ML; G/100ML
100 INJECTION, SOLUTION INTRAVENOUS CONTINUOUS
Status: DISCONTINUED | OUTPATIENT
Start: 2024-07-18 | End: 2024-07-20

## 2024-07-18 RX ORDER — DEXTROMETHORPHAN POLISTIREX 30 MG/5 ML
1 SUSPENSION, EXTENDED RELEASE 12 HR ORAL ONCE
Status: DISCONTINUED | OUTPATIENT
Start: 2024-07-18 | End: 2024-07-18

## 2024-07-18 RX ORDER — DULOXETIN HYDROCHLORIDE 30 MG/1
60 CAPSULE, DELAYED RELEASE ORAL DAILY
Status: DISCONTINUED | OUTPATIENT
Start: 2024-07-18 | End: 2024-07-19

## 2024-07-18 RX ORDER — GLYCERIN 1 G/1
1 SUPPOSITORY RECTAL DAILY PRN
Status: DISCONTINUED | OUTPATIENT
Start: 2024-07-18 | End: 2024-07-18

## 2024-07-18 RX ORDER — POLYETHYLENE GLYCOL 3350, SODIUM CHLORIDE, SODIUM BICARBONATE, POTASSIUM CHLORIDE 420; 11.2; 5.72; 1.48 G/4L; G/4L; G/4L; G/4L
4000 POWDER, FOR SOLUTION ORAL ONCE
Status: COMPLETED | OUTPATIENT
Start: 2024-07-18 | End: 2024-07-18

## 2024-07-18 RX ORDER — ENOXAPARIN SODIUM 100 MG/ML
40 INJECTION SUBCUTANEOUS EVERY 24 HOURS
Status: DISCONTINUED | OUTPATIENT
Start: 2024-07-18 | End: 2024-07-25 | Stop reason: HOSPADM

## 2024-07-18 RX ORDER — POLYETHYLENE GLYCOL 3350, SODIUM CHLORIDE, SODIUM BICARBONATE, POTASSIUM CHLORIDE 420; 11.2; 5.72; 1.48 G/4L; G/4L; G/4L; G/4L
4000 POWDER, FOR SOLUTION ORAL ONCE
Status: DISCONTINUED | OUTPATIENT
Start: 2024-07-18 | End: 2024-07-18

## 2024-07-18 SDOH — SOCIAL STABILITY: SOCIAL INSECURITY: WERE YOU ABLE TO COMPLETE ALL THE BEHAVIORAL HEALTH SCREENINGS?: YES

## 2024-07-18 SDOH — SOCIAL STABILITY: SOCIAL INSECURITY: HAVE YOU HAD THOUGHTS OF HARMING ANYONE ELSE?: NO

## 2024-07-18 ASSESSMENT — PAIN - FUNCTIONAL ASSESSMENT
PAIN_FUNCTIONAL_ASSESSMENT: 0-10
PAIN_FUNCTIONAL_ASSESSMENT: 0-10

## 2024-07-18 ASSESSMENT — LIFESTYLE VARIABLES
HOW OFTEN DO YOU HAVE 6 OR MORE DRINKS ON ONE OCCASION: NEVER
SUBSTANCE_ABUSE_PAST_12_MONTHS: NO
AUDIT-C TOTAL SCORE: 1
EVER HAD A DRINK FIRST THING IN THE MORNING TO STEADY YOUR NERVES TO GET RID OF A HANGOVER: NO
TOTAL SCORE: 0
EVER FELT BAD OR GUILTY ABOUT YOUR DRINKING: NO
HOW OFTEN DO YOU HAVE A DRINK CONTAINING ALCOHOL: MONTHLY OR LESS
AUDIT-C TOTAL SCORE: 1
HAVE PEOPLE ANNOYED YOU BY CRITICIZING YOUR DRINKING: NO
PRESCIPTION_ABUSE_PAST_12_MONTHS: NO
HAVE YOU EVER FELT YOU SHOULD CUT DOWN ON YOUR DRINKING: NO
HOW MANY STANDARD DRINKS CONTAINING ALCOHOL DO YOU HAVE ON A TYPICAL DAY: 1 OR 2
SKIP TO QUESTIONS 9-10: 1

## 2024-07-18 ASSESSMENT — COGNITIVE AND FUNCTIONAL STATUS - GENERAL
DAILY ACTIVITIY SCORE: 24
MOBILITY SCORE: 24
PATIENT BASELINE BEDBOUND: NO
MOBILITY SCORE: 24
DAILY ACTIVITIY SCORE: 24
PATIENT BASELINE BEDBOUND: NO
MOBILITY SCORE: 24
DAILY ACTIVITIY SCORE: 24

## 2024-07-18 ASSESSMENT — ACTIVITIES OF DAILY LIVING (ADL)
BATHING: INDEPENDENT
HEARING - LEFT EAR: FUNCTIONAL
HEARING - RIGHT EAR: FUNCTIONAL
WALKS IN HOME: INDEPENDENT
JUDGMENT_ADEQUATE_SAFELY_COMPLETE_DAILY_ACTIVITIES: YES
ADEQUATE_TO_COMPLETE_ADL: YES
JUDGMENT_ADEQUATE_SAFELY_COMPLETE_DAILY_ACTIVITIES: YES
HEARING - LEFT EAR: FUNCTIONAL
TOILETING: INDEPENDENT
DRESSING YOURSELF: INDEPENDENT
TOILETING: INDEPENDENT
HEARING - RIGHT EAR: FUNCTIONAL
PATIENT'S MEMORY ADEQUATE TO SAFELY COMPLETE DAILY ACTIVITIES?: YES
WALKS IN HOME: INDEPENDENT
FEEDING YOURSELF: INDEPENDENT
FEEDING YOURSELF: INDEPENDENT
GROOMING: INDEPENDENT
GROOMING: INDEPENDENT
LACK_OF_TRANSPORTATION: NO
BATHING: INDEPENDENT
ASSISTIVE_DEVICE: DENTURES UPPER;EYEGLASSES
PATIENT'S MEMORY ADEQUATE TO SAFELY COMPLETE DAILY ACTIVITIES?: YES
DRESSING YOURSELF: INDEPENDENT
ADEQUATE_TO_COMPLETE_ADL: YES

## 2024-07-18 ASSESSMENT — PATIENT HEALTH QUESTIONNAIRE - PHQ9
2. FEELING DOWN, DEPRESSED OR HOPELESS: NOT AT ALL
SUM OF ALL RESPONSES TO PHQ9 QUESTIONS 1 & 2: 0
1. LITTLE INTEREST OR PLEASURE IN DOING THINGS: NOT AT ALL

## 2024-07-18 ASSESSMENT — COLUMBIA-SUICIDE SEVERITY RATING SCALE - C-SSRS
1. IN THE PAST MONTH, HAVE YOU WISHED YOU WERE DEAD OR WISHED YOU COULD GO TO SLEEP AND NOT WAKE UP?: NO
6. HAVE YOU EVER DONE ANYTHING, STARTED TO DO ANYTHING, OR PREPARED TO DO ANYTHING TO END YOUR LIFE?: NO
2. HAVE YOU ACTUALLY HAD ANY THOUGHTS OF KILLING YOURSELF?: NO

## 2024-07-18 ASSESSMENT — PAIN SCALES - GENERAL
PAINLEVEL_OUTOF10: 7
PAINLEVEL_OUTOF10: 10 - WORST POSSIBLE PAIN
PAINLEVEL_OUTOF10: 9
PAINLEVEL_OUTOF10: 10 - WORST POSSIBLE PAIN
PAINLEVEL_OUTOF10: 9
PAINLEVEL_OUTOF10: 2
PAINLEVEL_OUTOF10: 4
PAINLEVEL_OUTOF10: 5 - MODERATE PAIN

## 2024-07-18 ASSESSMENT — PAIN DESCRIPTION - LOCATION
LOCATION: ABDOMEN
LOCATION: ABDOMEN

## 2024-07-18 ASSESSMENT — PAIN DESCRIPTION - PAIN TYPE: TYPE: ACUTE PAIN

## 2024-07-18 NOTE — CARE PLAN
The patient's goals for the shift include  pt will have pain less than 4 by end of shift    The clinical goals for the shift include pt will be hemodynamically stable     Over the shift, the patient not make progress toward the following goals. Barriers to progression include understanding. Recommendations to address these barriers include education.

## 2024-07-18 NOTE — CARE PLAN
The patient's goals for the shift include  pt will be HDS throughout shift      The clinical goals for the shift include  pt will be hemodynamically stable.    Over the shift, the patient did make progress toward the following goals. Barriers to progression include understanding. Recommendations to address these barriers include education.

## 2024-07-18 NOTE — ED PROVIDER NOTES
EMERGENCY MEDICINE EVALUATION NOTE    History of Present Illness     Chief Complaint:   Chief Complaint   Patient presents with    Abdominal Pain       HPI: Barbara Sow is a 64 y.o. female presents with a chief complaint of abdominal pain.  Patient reports her abdominal pain has been going on now for about 2 days.  She states it got really bad last night though.  She reports that she has not been able to have a bowel movement in a little over 24 hours.  She denies a history of any bowel obstruction or any previous abdominal surgeries.  She reports that she had something similar to this many years ago but they never told her she had an obstruction.  Patient admits to some nausea and vomiting.  States her abdomen has become significantly distended at this time.  Patient reports that she has not vomited in some time but still feels very nauseous.    Previous History     Past Medical History:   Diagnosis Date    Encounter for screening mammogram for malignant neoplasm of breast 08/23/2022    Screening mammogram, encounter for    Hypothyroidism, unspecified 08/24/2022    Hypothyroidism    Ileus, unspecified (Multi) 07/19/2018    Ileus    Osteoarthritis of knee, unspecified 07/30/2019    OA (osteoarthritis) of knee    Primary osteoarthritis, unspecified hand 12/12/2018    Osteoarthritis of finger, unspecified laterality     No past surgical history on file.  Social History     Tobacco Use    Smoking status: Every Day     Current packs/day: 0.50     Types: Cigarettes    Smokeless tobacco: Never   Substance Use Topics    Alcohol use: Yes     Comment: 1 A MONTH    Drug use: Never     Family History   Problem Relation Name Age of Onset    No Known Problems Mother      Heart attack Father      Aneurysm Sister       Allergies   Allergen Reactions    Cefaclor Hives    Cefuroxime Axetil Unknown    Ketorolac Unknown     Current Outpatient Medications   Medication Instructions    docusate sodium (Colace) 100 mg capsule  oral, 2 times daily    DULoxetine (CYMBALTA) 60 mg, oral, Daily, Do not crush or chew.    gabapentin (Neurontin) 600 mg tablet take 2 tablets by mouth every morning and take 3 every evening    ibuprofen 600 mg tablet oral    levothyroxine (Synthroid, Levoxyl) 88 mcg tablet Take one qd, except for one day per day take one extra (total 8 tabs per week)       Physical Exam     Appearance: Alert, oriented , cooperative    Skin: Intact,  dry skin, no lesions, rash, petechiae or purpura.      Eyes: PERRLA, EOMs intact,  Conjunctiva pink      ENT: Hearing grossly intact. Pharynx clear     Neck: Supple. Trachea at midline.      Pulmonary: Clear bilaterally. No rales, rhonchi or wheezing. No accessory muscle use or stridor.     Cardiac: Normal rate and rhythm without murmur     Abdomen: Decreased bowel sounds, abdominal distention, diffuse tenderness     Musculoskeletal: Full range of motion.      Neurological:Cranial nerves II through XII are grossly intact, normal sensation, no weakness, no focal findings identified.     Results     Labs Reviewed   CBC WITH AUTO DIFFERENTIAL - Abnormal       Result Value    WBC 12.1 (*)     nRBC 0.0      RBC 4.05      Hemoglobin 11.8 (*)     Hematocrit 37.0      MCV 91      MCH 29.1      MCHC 31.9 (*)     RDW 15.0 (*)     Platelets 660 (*)     Neutrophils % 71.2      Immature Granulocytes %, Automated 0.3      Lymphocytes % 15.8      Monocytes % 5.5      Eosinophils % 5.3      Basophils % 1.9      Neutrophils Absolute 8.60 (*)     Immature Granulocytes Absolute, Automated 0.04      Lymphocytes Absolute 1.91      Monocytes Absolute 0.66      Eosinophils Absolute 0.64      Basophils Absolute 0.23 (*)    COMPREHENSIVE METABOLIC PANEL - Abnormal    Glucose 83      Sodium 128 (*)     Potassium 5.8 (*)     Chloride 96 (*)     Bicarbonate 21      Anion Gap 17      Urea Nitrogen 23      Creatinine 0.70      eGFR >90      Calcium 9.5      Albumin 4.3      Alkaline Phosphatase 100      Total Protein  7.8      AST 37      Bilirubin, Total 0.3      ALT 14     URINALYSIS WITH REFLEX CULTURE AND MICROSCOPIC - Abnormal    Color, Urine Yellow      Appearance, Urine Clear      Specific Gravity, Urine 1.019      pH, Urine 5.5      Protein, Urine 10 (TRACE)      Glucose, Urine Normal      Blood, Urine NEGATIVE      Ketones, Urine 10 (1+) (*)     Bilirubin, Urine NEGATIVE      Urobilinogen, Urine 2 (1+) (*)     Nitrite, Urine NEGATIVE      Leukocyte Esterase, Urine 75 Estephania/µL (*)    LIPASE - Normal    Lipase 11      Narrative:     Venipuncture immediately after or during the administration of Metamizole may lead to falsely low results. Testing should be performed immediately prior to Metamizole dosing.   LACTATE - Normal    Lactate 1.0      Narrative:     Venipuncture immediately after or during the administration of Metamizole may lead to falsely low results. Testing should be performed immediately  prior to Metamizole dosing.   TROPONIN I, HIGH SENSITIVITY - Normal    Troponin I, High Sensitivity 6      Narrative:     Less than 99th percentile of normal range cutoff-  Female and children under 18 years old <14 ng/L; Male <21 ng/L: Negative  Repeat testing should be performed if clinically indicated.     Female and children under 18 years old 14-50 ng/L; Male 21-50 ng/L:  Consistent with possible cardiac damage and possible increased clinical   risk. Serial measurements may help to assess extent of myocardial damage.     >50 ng/L: Consistent with cardiac damage, increased clinical risk and  myocardial infarction. Serial measurements may help assess extent of   myocardial damage.      NOTE: Children less than 1 year old may have higher baseline troponin   levels and results should be interpreted in conjunction with the overall   clinical context.     NOTE: Troponin I testing is performed using a different   testing methodology at [facility] than at other   [facility]. Direct result comparisons should  only   be made within the same method.   URINE CULTURE   MICROSCOPIC ONLY, URINE    WBC, Urine 1-5      RBC, Urine 1-2      Squamous Epithelial Cells, Urine 1-9 (SPARSE)      Mucus, Urine FEW     URINALYSIS WITH REFLEX CULTURE AND MICROSCOPIC    Narrative:     The following orders were created for panel order Urinalysis with Reflex Culture and Microscopic.  Procedure                               Abnormality         Status                     ---------                               -----------         ------                     Urinalysis with Reflex C...[[id number]]  Abnormal            Final result               Extra Urine Gray Tube[[id number]]                            In process                   Please view results for these tests on the individual orders.   EXTRA URINE GRAY TUBE     CT abdomen pelvis w IV contrast   Final Result   Dilated stool-filled colon with transition point in the region of the   splenic flexure, suggesting at least partial obstruction. The colon   is decompressed distal to this region. There is circumferential bowel   wall thickening involving the mid and distal transverse colon,   proximally 25 cm in length. Although the appearance favors colitis,   colonoscopy is recommended to exclude an underlying mass given the   presence of a transition point.        Nonobstructive left renal calculi.        Signed by: Sandra Ruelas 7/18/2024 12:57 PM   Dictation workstation:   [id number]            ED Course & Medical Decision Making     Medications   mineral oil enema 1 enema (has no administration in time range)   glycerin (Adult) 2 g suppository 1 suppository (has no administration in time range)   sodium chloride 0.9 % bolus 1,000 mL (has no administration in time range)   lactated Ringer's bolus 1,000 mL (has no administration in time range)   ondansetron (Zofran) injection 4 mg (4 mg intravenous Given 7/18/24 0932)   morphine injection 4 mg (4 mg intravenous Given 7/18/24 0932)  "  ondansetron (Zofran) injection 4 mg (4 mg intravenous Given 7/18/24 1145)   iohexol (OMNIPaque) 350 mg iodine/mL solution 75 mL (75 mL intravenous Given 7/18/24 1155)   morphine injection 4 mg (4 mg intravenous Given 7/18/24 1222)     Heart Rate:  []   Temperature:  [36 °C (96.8 °F)-36.4 °C (97.5 °F)]   Respirations:  [15-20]   BP: (124-158)/(71-94)   Height:  [170.2 cm (5' 7\")]   Weight:  [59 kg (130 lb)]   Pulse Ox:  [96 %-99 %]    ED Course as of 07/18/24 1451   u Jul 18, 2024   1040 EKG performed 1924 at 10:29 AM.  Sinus rhythm with a ventricular to 71 bpm, AZ interval of 159 ms, QT/QTc of 431/469 ms.  No STEMI. [CJ]   1304 Spoke to OR nurse who responded to residents pager.  She states that the resident is currently operating I discussed the case with her she states that she will relay the information to the resident and they will give us a call back once the current surgery is complete.  [CJ]   1435 Dr. Freeman to admit the patient under his own service but would like a hospitalist consult for medical management. [CJ]   1449 Spoke to Matthew Rosas NP Agree to consult on behalf of hospitalist [CJ]      ED Course User Index  [CJ] Stephen Buitrago PA-C         Diagnoses as of 07/18/24 1451   Intestinal volvulus (Multi)       Procedures   Procedures    Diagnosis     1. Intestinal volvulus (Multi)        Disposition   Admit for observation    ED Prescriptions    None         Disclaimer: This note was dictated by speech recognition. Minor errors in transcription may be present. Please call if questions.       Stephen Buitrago PA-C  07/18/24 1451    "

## 2024-07-18 NOTE — CONSULTS
Consults  History Of Present Illness  Barbara Sow is a 64 y.o. female with PMHx of Hypothyroidism anxiety, depression , prior ileus and multiple prior abdominal surgeries who presented with abdominal pain for about two days and increased distention starting last night. She has not been able to have a bowel movement in a little over 24 hours. Her abdomen has become significantly distended. She has not vomited in some time but still feels very nauseous. ED workup was significant for K 5.0, Na 128 and a UA suggestive of UTI but patient is not having any urinary symptoms. . CT imaging revealed dilated stool-filled colon with transition point in the region of the splenic flexure, suggesting at least partial obstruction. She was admitted to the surgery service and we are consulted for medical management. Remainder of ROS reviewed and negative except as indicated in HPI.     Objective  Past Medical History  She has a past medical history of Anxiety and depression, Hypothyroidism, unspecified, Ileus, unspecified (Multi), and SBO (small bowel obstruction) (Multi) (07/18/2024).    Surgical History  She has a past surgical history that includes Cholecystectomy; Appendectomy; and Hysterectomy.    Social History     Tobacco Use    Smoking status: Every Day     Current packs/day: 0.50     Types: Cigarettes    Smokeless tobacco: Never   Substance Use Topics    Alcohol use: Yes     Comment: 1 A MONTH    Drug use: Never       Family History   Problem Relation Name Age of Onset    No Known Problems Mother      Heart attack Father      Aneurysm Sister         Cefaclor, Cefuroxime axetil, and Ketorolac    Vitals:    07/18/24 1400   BP: (!) 146/94   Pulse: 75   Resp: 15   Temp:    SpO2: 97%       Vitals:    07/18/24 0913   Weight: 59 kg (130 lb)       Scheduled medications  lactated Ringer's, 1,000 mL, intravenous, Once  mineral oil, 1 enema, rectal, Once  morphine, 4 mg, intravenous, Once  sodium chloride, 1,000 mL,  intravenous, Once      Continuous medications     PRN medications  PRN medications: glycerin    Results for orders placed or performed during the hospital encounter of 07/18/24 (from the past 24 hour(s))   CBC and Auto Differential   Result Value Ref Range    WBC 12.1 (H) 4.4 - 11.3 x10*3/uL    nRBC 0.0 0.0 - 0.0 /100 WBCs    RBC 4.05 4.00 - 5.20 x10*6/uL    Hemoglobin 11.8 (L) 12.0 - 16.0 g/dL    Hematocrit 37.0 36.0 - 46.0 %    MCV 91 80 - 100 fL    MCH 29.1 26.0 - 34.0 pg    MCHC 31.9 (L) 32.0 - 36.0 g/dL    RDW 15.0 (H) 11.5 - 14.5 %    Platelets 660 (H) 150 - 450 x10*3/uL    Neutrophils % 71.2 40.0 - 80.0 %    Immature Granulocytes %, Automated 0.3 0.0 - 0.9 %    Lymphocytes % 15.8 13.0 - 44.0 %    Monocytes % 5.5 2.0 - 10.0 %    Eosinophils % 5.3 0.0 - 6.0 %    Basophils % 1.9 0.0 - 2.0 %    Neutrophils Absolute 8.60 (H) 1.20 - 7.70 x10*3/uL    Immature Granulocytes Absolute, Automated 0.04 0.00 - 0.70 x10*3/uL    Lymphocytes Absolute 1.91 1.20 - 4.80 x10*3/uL    Monocytes Absolute 0.66 0.10 - 1.00 x10*3/uL    Eosinophils Absolute 0.64 0.00 - 0.70 x10*3/uL    Basophils Absolute 0.23 (H) 0.00 - 0.10 x10*3/uL   Lactate   Result Value Ref Range    Lactate 1.0 0.4 - 2.0 mmol/L   Troponin I, High Sensitivity   Result Value Ref Range    Troponin I, High Sensitivity 6 0 - 13 ng/L   Urinalysis with Reflex Culture and Microscopic   Result Value Ref Range    Color, Urine Yellow Light-Yellow, Yellow, Dark-Yellow    Appearance, Urine Clear Clear    Specific Gravity, Urine 1.019 1.005 - 1.035    pH, Urine 5.5 5.0, 5.5, 6.0, 6.5, 7.0, 7.5, 8.0    Protein, Urine 10 (TRACE) NEGATIVE, 10 (TRACE), 20 (TRACE) mg/dL    Glucose, Urine Normal Normal mg/dL    Blood, Urine NEGATIVE NEGATIVE    Ketones, Urine 10 (1+) (A) NEGATIVE mg/dL    Bilirubin, Urine NEGATIVE NEGATIVE    Urobilinogen, Urine 2 (1+) (A) Normal mg/dL    Nitrite, Urine NEGATIVE NEGATIVE    Leukocyte Esterase, Urine 75 Estephania/µL (A) NEGATIVE   Microscopic Only, Urine    Result Value Ref Range    WBC, Urine 1-5 1-5, NONE /HPF    RBC, Urine 1-2 NONE, 1-2, 3-5 /HPF    Squamous Epithelial Cells, Urine 1-9 (SPARSE) Reference range not established. /HPF    Mucus, Urine FEW Reference range not established. /LPF   Comprehensive metabolic panel   Result Value Ref Range    Glucose 83 74 - 99 mg/dL    Sodium 128 (L) 136 - 145 mmol/L    Potassium 5.8 (H) 3.5 - 5.3 mmol/L    Chloride 96 (L) 98 - 107 mmol/L    Bicarbonate 21 21 - 32 mmol/L    Anion Gap 17 10 - 20 mmol/L    Urea Nitrogen 23 6 - 23 mg/dL    Creatinine 0.70 0.50 - 1.05 mg/dL    eGFR >90 >60 mL/min/1.73m*2    Calcium 9.5 8.6 - 10.3 mg/dL    Albumin 4.3 3.4 - 5.0 g/dL    Alkaline Phosphatase 100 33 - 136 U/L    Total Protein 7.8 6.4 - 8.2 g/dL    AST 37 9 - 39 U/L    Bilirubin, Total 0.3 0.0 - 1.2 mg/dL    ALT 14 7 - 45 U/L   Lipase   Result Value Ref Range    Lipase 11 9 - 82 U/L   ECG 12 lead   Result Value Ref Range    Ventricular Rate 71 BPM    Atrial Rate 71 BPM    CA Interval 159 ms    QRS Duration 93 ms    QT Interval 431 ms    QTC Calculation(Bazett) 469 ms    P Axis 78 degrees    R Axis 36 degrees    T Axis 85 degrees    QRS Count 12 beats    Q Onset 249 ms    T Offset 465 ms    QTC Fredericia 456 ms       I personally reviewed all pertinent labwork, imaging and vital signs, as well as medications, nursing, therapy, discharge planning and consult notes.     Constitutional: Well developed, awake, alert, calm, oriented x4, no acute distress, cooperative   Eyes: EOMI, clear sclera   ENMT: mucous membranes moist, no lesions seen   Head/Neck: Neck supple, no apparent injury, head atraumatic   Respiratory/Thorax: CTAB, good chest expansion, respirations even and unlabored   Cardiovascular: Regular rate and rhythm, no murmurs/rubs/gallops, normal S1 and S 2   Gastrointestinal: Abdomen  is distended, , nontender, +BS, no bruits   Musculoskeletal: ROM intact, no joint swelling, normal  strength   Extremities: no cyanosis, edema,  contusions or clubbing   Neurological: no focal deficit, pt alert and oriented x4   Psychological: Appropriate affect and behavior, pleasant   Skin: Warm and dry, no lesions, no rashes  Genitourinary: Larkin draining clear yellow urine       Assessment/Plan  Colonic obstruction/Abdominal pain  Pt NPO per surgery, attempting tx with enemas    Hypothyroidism  Pt states has been off lately  Continue home med for now if able to take po  Will check level, may need increase    Anxiety /Sciatica  Continue Cymbalta when taking PO    Tobacco dependence  Cessation encouraged      Dung Rosas [facility] Medicine

## 2024-07-18 NOTE — CONSULTS
"Reason For Consult  Bowel obstruction    History Of Present Illness  Barbara Sow is a 64 y.o. female presenting with diffuse abdominal pain since yesterday.  History is noted for issues with constipation and had previously been admitted to an OSH with similar problem but not as severe as current episode. Her last flatus was yesterday (17th July 2024), and her last bowel movements (x2) were Tuesday (16th July 2024).  Her history is also significant for having only weekly bowel movements, having had an open appendectomy, gyn procedures x2, hysterectomy for \"pain\" and lap sarina.  She states that her last colonoscopy was >10 years ago and was not able to elaborate on any findings.  She is tolerating PO intake but is reduced secondary to the abdominal distension.  She smokes about 2 packs of cigarettes a day and reports some occasional marijuana use.     Past Medical History:   Diagnosis Date    Anxiety and depression     Hypothyroidism, unspecified     Ileus, unspecified (Multi)     SBO (small bowel obstruction) (Multi) 07/18/2024     Past Surgical History:   Procedure Laterality Date    APPENDECTOMY      CHOLECYSTECTOMY      HYSTERECTOMY      x2     No current facility-administered medications on file prior to encounter.     Current Outpatient Medications on File Prior to Encounter   Medication Sig Dispense Refill    DULoxetine (Cymbalta) 60 mg DR capsule Take 1 capsule (60 mg) by mouth once daily. Do not crush or chew.      gabapentin (Neurontin) 600 mg tablet Take 1 tablet (600 mg) by mouth 2 times a day. Take three tabs in the morning and 2 tabs at night.      levothyroxine (Tirosint) 88 mcg capsule Take 1 capsule (88 mcg) by mouth early in the morning.. Take on an empty stomach at the same time each day, either 30 to 60 minutes prior to breakfast      [DISCONTINUED] docusate sodium (Colace) 100 mg capsule Take by mouth twice a day.      [DISCONTINUED] DULoxetine (Cymbalta) 60 mg DR capsule Take 1 capsule " (60 mg) by mouth once daily. Do not crush or chew. 90 capsule 0    [DISCONTINUED] gabapentin (Neurontin) 600 mg tablet take 2 tablets by mouth every morning and take 3 every evening 150 tablet 11    [DISCONTINUED] ibuprofen 600 mg tablet Take by mouth.      [DISCONTINUED] levothyroxine (Synthroid, Levoxyl) 88 mcg tablet Take one qd, except for one day per day take one extra (total 8 tabs per week) 90 tablet 1     Allergies   Allergen Reactions    Cefaclor Hives    Cefuroxime Axetil Unknown    Ketorolac Unknown     Social History     Socioeconomic History    Marital status: Single     Spouse name: Not on file    Number of children: Not on file    Years of education: Not on file    Highest education level: Not on file   Occupational History    Not on file   Tobacco Use    Smoking status: Every Day     Current packs/day: 0.50     Types: Cigarettes    Smokeless tobacco: Never   Substance and Sexual Activity    Alcohol use: Yes     Comment: 1 A MONTH    Drug use: Never    Sexual activity: Not on file   Other Topics Concern    Not on file   Social History Narrative    Not on file     Social Determinants of Health     Financial Resource Strain: High Risk (7/18/2024)    Overall Financial Resource Strain (CARDIA)     Difficulty of Paying Living Expenses: Hard   Food Insecurity: At Risk (8/15/2023)    Received from [facility]    Food Insecurity     Food: 2   Transportation Needs: No Transportation Needs (7/18/2024)    PRAPARE - Transportation     Lack of Transportation (Medical): No     Lack of Transportation (Non-Medical): No   Physical Activity: Not on File (8/10/2023)    Received from [facility]    Physical Activity     Physical Activity: 0   Stress: At Risk (8/15/2023)    [facility] from [facility]    Stress     Stress: 2   Social Connections: Not at Risk (8/15/2023)    Received from [facility]    Social Connections     Social Connections and Isolation: 1   Intimate Partner Violence: Not on file   Housing Stability:  "High Risk (7/18/2024)    Housing Stability Vital Sign     Unable to Pay for Housing in the Last Year: Yes     Number of Times Moved in the Last Year: 2     Homeless in the Last Year: Yes     Family History   Problem Relation Name Age of Onset    No Known Problems Mother      Heart attack Father      Aneurysm Sister         Review of Systems  Constitutional: Denies changes in weight, fatigue, night-sweats  HEENT: No changes in vision, nasal drainage, headache  CV: No palpitations, no chest pain, no lower extremity oedema  Resp: No wheezing, no cough, no shortness of breath  GI: Some nausea, vomiting, diarrhoea, +constipation  : No dysuria, no increased frequency  Neuro: No weakness, confusion, numbness, dizziness  MSK: No weakness, arthralgias, myalgias  Haem: No easy bruising, no easy bleeding  Skin: No new lesions or rashes  Endocrine: No polydipsia, polyuria, heat/cold insensitivity       Physical Exam  /84 (BP Location: Right arm, Patient Position: Lying)   Pulse 86   Temp 35.8 °C (96.4 °F) (Temporal)   Resp 17   Ht 1.702 m (5' 7\")   Wt 59 kg (130 lb)   SpO2 97%   BMI 20.36 kg/m²   NAD  RRR  Resp soft  Abd grossly distended, tympanitic, no guarding, no rebound tenderness  GENNA with stool in rectal vault, +external haemorrhoids  WWP    Relevant Results  Results for orders placed or performed during the hospital encounter of 07/18/24 (from the past 24 hour(s))   CBC and Auto Differential   Result Value Ref Range    WBC 12.1 (H) 4.4 - 11.3 x10*3/uL    nRBC 0.0 0.0 - 0.0 /100 WBCs    RBC 4.05 4.00 - 5.20 x10*6/uL    Hemoglobin 11.8 (L) 12.0 - 16.0 g/dL    Hematocrit 37.0 36.0 - 46.0 %    MCV 91 80 - 100 fL    MCH 29.1 26.0 - 34.0 pg    MCHC 31.9 (L) 32.0 - 36.0 g/dL    RDW 15.0 (H) 11.5 - 14.5 %    Platelets 660 (H) 150 - 450 x10*3/uL    Neutrophils % 71.2 40.0 - 80.0 %    Immature Granulocytes %, Automated 0.3 0.0 - 0.9 %    Lymphocytes % 15.8 13.0 - 44.0 %    Monocytes % 5.5 2.0 - 10.0 %    " Eosinophils % 5.3 0.0 - 6.0 %    Basophils % 1.9 0.0 - 2.0 %    Neutrophils Absolute 8.60 (H) 1.20 - 7.70 x10*3/uL    Immature Granulocytes Absolute, Automated 0.04 0.00 - 0.70 x10*3/uL    Lymphocytes Absolute 1.91 1.20 - 4.80 x10*3/uL    Monocytes Absolute 0.66 0.10 - 1.00 x10*3/uL    Eosinophils Absolute 0.64 0.00 - 0.70 x10*3/uL    Basophils Absolute 0.23 (H) 0.00 - 0.10 x10*3/uL   Lactate   Result Value Ref Range    Lactate 1.0 0.4 - 2.0 mmol/L   Troponin I, High Sensitivity   Result Value Ref Range    Troponin I, High Sensitivity 6 0 - 13 ng/L   Urinalysis with Reflex Culture and Microscopic   Result Value Ref Range    Color, Urine Yellow Light-Yellow, Yellow, Dark-Yellow    Appearance, Urine Clear Clear    Specific Gravity, Urine 1.019 1.005 - 1.035    pH, Urine 5.5 5.0, 5.5, 6.0, 6.5, 7.0, 7.5, 8.0    Protein, Urine 10 (TRACE) NEGATIVE, 10 (TRACE), 20 (TRACE) mg/dL    Glucose, Urine Normal Normal mg/dL    Blood, Urine NEGATIVE NEGATIVE    Ketones, Urine 10 (1+) (A) NEGATIVE mg/dL    Bilirubin, Urine NEGATIVE NEGATIVE    Urobilinogen, Urine 2 (1+) (A) Normal mg/dL    Nitrite, Urine NEGATIVE NEGATIVE    Leukocyte Esterase, Urine 75 Estephania/µL (A) NEGATIVE   Extra Urine Gray Tube   Result Value Ref Range    Extra Tube Hold for add-ons.    Microscopic Only, Urine   Result Value Ref Range    WBC, Urine 1-5 1-5, NONE /HPF    RBC, Urine 1-2 NONE, 1-2, 3-5 /HPF    Squamous Epithelial Cells, Urine 1-9 (SPARSE) Reference range not established. /HPF    Mucus, Urine FEW Reference range not established. /LPF   Comprehensive metabolic panel   Result Value Ref Range    Glucose 83 74 - 99 mg/dL    Sodium 128 (L) 136 - 145 mmol/L    Potassium 5.8 (H) 3.5 - 5.3 mmol/L    Chloride 96 (L) 98 - 107 mmol/L    Bicarbonate 21 21 - 32 mmol/L    Anion Gap 17 10 - 20 mmol/L    Urea Nitrogen 23 6 - 23 mg/dL    Creatinine 0.70 0.50 - 1.05 mg/dL    eGFR >90 >60 mL/min/1.73m*2    Calcium 9.5 8.6 - 10.3 mg/dL    Albumin 4.3 3.4 - 5.0 g/dL     Alkaline Phosphatase 100 33 - 136 U/L    Total Protein 7.8 6.4 - 8.2 g/dL    AST 37 9 - 39 U/L    Bilirubin, Total 0.3 0.0 - 1.2 mg/dL    ALT 14 7 - 45 U/L   Lipase   Result Value Ref Range    Lipase 11 9 - 82 U/L   Magnesium   Result Value Ref Range    Magnesium 2.39 1.60 - 2.40 mg/dL   ECG 12 lead   Result Value Ref Range    Ventricular Rate 71 BPM    Atrial Rate 71 BPM    WY Interval 159 ms    QRS Duration 93 ms    QT Interval 431 ms    QTC Calculation(Bazett) 469 ms    P Axis 78 degrees    R Axis 36 degrees    T Axis 85 degrees    QRS Count 12 beats    Q Onset 249 ms    T Offset 465 ms    QTC Fredericia 456 ms   CBC and Auto Differential   Result Value Ref Range    WBC 11.7 (H) 4.4 - 11.3 x10*3/uL    nRBC 0.0 0.0 - 0.0 /100 WBCs    RBC 3.76 (L) 4.00 - 5.20 x10*6/uL    Hemoglobin 11.2 (L) 12.0 - 16.0 g/dL    Hematocrit 34.8 (L) 36.0 - 46.0 %    MCV 93 80 - 100 fL    MCH 29.8 26.0 - 34.0 pg    MCHC 32.2 32.0 - 36.0 g/dL    RDW 15.0 (H) 11.5 - 14.5 %    Platelets 623 (H) 150 - 450 x10*3/uL    Neutrophils % 71.8 40.0 - 80.0 %    Immature Granulocytes %, Automated 0.3 0.0 - 0.9 %    Lymphocytes % 16.6 13.0 - 44.0 %    Monocytes % 4.6 2.0 - 10.0 %    Eosinophils % 4.9 0.0 - 6.0 %    Basophils % 1.8 0.0 - 2.0 %    Neutrophils Absolute 8.40 (H) 1.20 - 7.70 x10*3/uL    Immature Granulocytes Absolute, Automated 0.04 0.00 - 0.70 x10*3/uL    Lymphocytes Absolute 1.95 1.20 - 4.80 x10*3/uL    Monocytes Absolute 0.54 0.10 - 1.00 x10*3/uL    Eosinophils Absolute 0.58 0.00 - 0.70 x10*3/uL    Basophils Absolute 0.21 (H) 0.00 - 0.10 x10*3/uL   Basic Metabolic Panel   Result Value Ref Range    Glucose 73 (L) 74 - 99 mg/dL    Sodium 130 (L) 136 - 145 mmol/L    Potassium 4.1 3.5 - 5.3 mmol/L    Chloride 98 98 - 107 mmol/L    Bicarbonate 21 21 - 32 mmol/L    Anion Gap 15 10 - 20 mmol/L    Urea Nitrogen 25 (H) 6 - 23 mg/dL    Creatinine 0.94 0.50 - 1.05 mg/dL    eGFR 67 >60 mL/min/1.73m*2    Calcium 9.8 8.6 - 10.3 mg/dL   Magnesium    Result Value Ref Range    Magnesium 2.07 1.60 - 2.40 mg/dL   Phosphorus   Result Value Ref Range    Phosphorus 4.4 2.5 - 4.9 mg/dL     CT abdomen pelvis w IV contrast 07/18/2024    Narrative  Interpreted By:  Sandra Ruelas,  STUDY:  CT ABDOMEN PELVIS W IV CONTRAST;  7/18/2024 12:04 pm    INDICATION:  63 y/o   F with  Signs/Symptoms:Abdominal pain and distention concern  for obstruction.    LIMITATIONS:  None.    ACCESSION NUMBER(S):  [id number]    ORDERING CLINICIAN:  LOTTIE SALTER    TECHNIQUE:  After the administration of IV iodonated contrast, spiral axial  images were obtained from the xiphoid down through the symphysis  pubis. Sagittal and coronal reconstruction images were generated.  75 mL of Omnipaque 350.    COMPARISON:  05/21/2023    FINDINGS:  Lower Chest: Clear.    Liver: The liver is unremarkable without focal lesion.    Gallbladder and Biliary: Dilated CBD likely related to  postcholecystectomy change in a patient with normal LFTs.    Pancreas: No abnormality identified in the pancreas.    Spleen: No abnormality identified in the spleen.    Adrenals: No abnormality identified in either adrenal gland.    Urinary: Redemonstrated left renal cyst. There is a 3 mm midzone  calculus in the left kidney, another measuring 2 mm in the lower  pole. No hydronephrosis.    Reproductive: Status post hysterectomy.    Gastrointestinal/Peritoneum: Dilated stool-filled colon, with  transition point in the region of the splenic flexure where there is  bowel wall thickening. Circumferential bowel wall thickening is noted  in the distal transverse colon extending to the splenic flexure,  approximately 25 cm in length. Proximal to this region the colon is  markedly distended and stool-filled. The appendix is not reliably  identified. In the abdomen, there is no extraluminal air. No  significant free fluid.    Vascular: Abdominal aorta is normal in caliber. Moderate  atherosclerosis.    Lymphatics: No enlarged lymph  nodes by size criteria.    MSK/Body Wall: No aggressive bony lesion identified.    Impression  Dilated stool-filled colon with transition point in the region of the  splenic flexure, suggesting at least partial obstruction. The colon  is decompressed distal to this region. There is circumferential bowel  wall thickening involving the mid and distal transverse colon,  proximally 25 cm in length. Although the appearance favors colitis,  colonoscopy is recommended to exclude an underlying mass given the  presence of a transition point.    Nonobstructive left renal calculi.    Signed by: Sandra Ruelas 7/18/2024 12:57 PM  Dictation workstation:   [id number]       Assessment/Plan   64F with concerns for partial LBO with constipation.  I reviewed the CT scan findings and am able to see some gas in the descending colon but is definitely collapsed distal to the splenic flexure - unable to appreciate an actual intraluminal mass.  She also has hyperkalaemia with K 5.8, hyponatraemia 128 both with unknown aetiology, no definite UTI as has no significant pyuria, leukocytosis likely secondary to underlying abdominal pathology.  The concern is if she has an intraluminal mass causing the obstruction.  For now, will try some gentle enemas from below and to sips of golytely.  She will definitely need a gastrografin/omnipaque enema to look for intraluminal findings - unsure if it can go all the way up to the splenic flexure.  Unfortunately we won't be able to do that study until tomorrow.  Will admit to SD for close monitoring, repeating her electrolytes and monitoring of her leukocytosis.  Plan was discussed with the patient who is in agreement.  Will also get IMS input for management of chronic medical issues.  Unfortunately, we will not be able to seek help from the GI team either as there is no coverage until next week.       Sharee Freeman MD

## 2024-07-19 ENCOUNTER — APPOINTMENT (OUTPATIENT)
Dept: CARDIOLOGY | Facility: HOSPITAL | Age: 64
End: 2024-07-19
Payer: MEDICARE

## 2024-07-19 ENCOUNTER — ANESTHESIA (OUTPATIENT)
Dept: OPERATING ROOM | Facility: HOSPITAL | Age: 64
End: 2024-07-19
Payer: MEDICARE

## 2024-07-19 ENCOUNTER — APPOINTMENT (OUTPATIENT)
Dept: RADIOLOGY | Facility: HOSPITAL | Age: 64
End: 2024-07-19
Payer: MEDICARE

## 2024-07-19 ENCOUNTER — ANESTHESIA EVENT (OUTPATIENT)
Dept: OPERATING ROOM | Facility: HOSPITAL | Age: 64
End: 2024-07-19
Payer: MEDICARE

## 2024-07-19 PROBLEM — K56.609 LARGE BOWEL OBSTRUCTION (MULTI): Status: ACTIVE | Noted: 2024-07-18

## 2024-07-19 LAB
ALBUMIN SERPL BCP-MCNC: 3.9 G/DL (ref 3.4–5)
ALP SERPL-CCNC: 97 U/L (ref 33–136)
ALT SERPL W P-5'-P-CCNC: 10 U/L (ref 7–45)
ANION GAP SERPL CALC-SCNC: 14 MMOL/L (ref 10–20)
APTT PPP: 34 SECONDS (ref 27–38)
AST SERPL W P-5'-P-CCNC: 15 U/L (ref 9–39)
ATRIAL RATE: 55 BPM
ATRIAL RATE: 71 BPM
BACTERIA UR CULT: NO GROWTH
BASOPHILS # BLD AUTO: 0.16 X10*3/UL (ref 0–0.1)
BASOPHILS NFR BLD AUTO: 1.3 %
BILIRUB DIRECT SERPL-MCNC: 0.1 MG/DL (ref 0–0.3)
BILIRUB SERPL-MCNC: 0.3 MG/DL (ref 0–1.2)
BUN SERPL-MCNC: 23 MG/DL (ref 6–23)
CALCIUM SERPL-MCNC: 8.8 MG/DL (ref 8.6–10.3)
CHLORIDE SERPL-SCNC: 102 MMOL/L (ref 98–107)
CO2 SERPL-SCNC: 19 MMOL/L (ref 21–32)
CREAT SERPL-MCNC: 0.62 MG/DL (ref 0.5–1.05)
EGFRCR SERPLBLD CKD-EPI 2021: >90 ML/MIN/1.73M*2
EOSINOPHIL # BLD AUTO: 0.18 X10*3/UL (ref 0–0.7)
EOSINOPHIL NFR BLD AUTO: 1.5 %
ERYTHROCYTE [DISTWIDTH] IN BLOOD BY AUTOMATED COUNT: 14.7 % (ref 11.5–14.5)
GLUCOSE SERPL-MCNC: 95 MG/DL (ref 74–99)
HCT VFR BLD AUTO: 32.8 % (ref 36–46)
HGB BLD-MCNC: 10.4 G/DL (ref 12–16)
HOLD SPECIMEN: NORMAL
IMM GRANULOCYTES # BLD AUTO: 0.04 X10*3/UL (ref 0–0.7)
IMM GRANULOCYTES NFR BLD AUTO: 0.3 % (ref 0–0.9)
INR PPP: 1 (ref 0.9–1.1)
LACTATE SERPL-SCNC: 0.5 MMOL/L (ref 0.4–2)
LYMPHOCYTES # BLD AUTO: 1.29 X10*3/UL (ref 1.2–4.8)
LYMPHOCYTES NFR BLD AUTO: 10.5 %
MAGNESIUM SERPL-MCNC: 2 MG/DL (ref 1.6–2.4)
MCH RBC QN AUTO: 29.1 PG (ref 26–34)
MCHC RBC AUTO-ENTMCNC: 31.7 G/DL (ref 32–36)
MCV RBC AUTO: 92 FL (ref 80–100)
MONOCYTES # BLD AUTO: 0.73 X10*3/UL (ref 0.1–1)
MONOCYTES NFR BLD AUTO: 6 %
NEUTROPHILS # BLD AUTO: 9.83 X10*3/UL (ref 1.2–7.7)
NEUTROPHILS NFR BLD AUTO: 80.4 %
NRBC BLD-RTO: 0 /100 WBCS (ref 0–0)
P AXIS: 6 DEGREES
P AXIS: 78 DEGREES
P OFFSET: 189 MS
P ONSET: 143 MS
PHOSPHATE SERPL-MCNC: 4 MG/DL (ref 2.5–4.9)
PLATELET # BLD AUTO: 619 X10*3/UL (ref 150–450)
POTASSIUM SERPL-SCNC: 4.1 MMOL/L (ref 3.5–5.3)
PR INTERVAL: 158 MS
PR INTERVAL: 159 MS
PREALB SERPL-MCNC: 15.8 MG/DL (ref 18–40)
PROT SERPL-MCNC: 6.5 G/DL (ref 6.4–8.2)
PROTHROMBIN TIME: 11.6 SECONDS (ref 9.8–12.8)
Q ONSET: 222 MS
Q ONSET: 249 MS
QRS COUNT: 12 BEATS
QRS COUNT: 9 BEATS
QRS DURATION: 93 MS
QRS DURATION: 94 MS
QT INTERVAL: 431 MS
QT INTERVAL: 488 MS
QTC CALCULATION(BAZETT): 466 MS
QTC CALCULATION(BAZETT): 469 MS
QTC FREDERICIA: 456 MS
QTC FREDERICIA: 474 MS
R AXIS: 29 DEGREES
R AXIS: 36 DEGREES
RBC # BLD AUTO: 3.57 X10*6/UL (ref 4–5.2)
SODIUM SERPL-SCNC: 131 MMOL/L (ref 136–145)
T AXIS: 110 DEGREES
T AXIS: 85 DEGREES
T OFFSET: 465 MS
T OFFSET: 466 MS
VENTRICULAR RATE: 55 BPM
VENTRICULAR RATE: 71 BPM
WBC # BLD AUTO: 12.2 X10*3/UL (ref 4.4–11.3)

## 2024-07-19 PROCEDURE — 93005 ELECTROCARDIOGRAM TRACING: CPT

## 2024-07-19 PROCEDURE — 3600000008 HC OR TIME - EACH INCREMENTAL 1 MINUTE - PROCEDURE LEVEL THREE: Performed by: SURGERY

## 2024-07-19 PROCEDURE — C9113 INJ PANTOPRAZOLE SODIUM, VIA: HCPCS | Performed by: SURGERY

## 2024-07-19 PROCEDURE — 2500000005 HC RX 250 GENERAL PHARMACY W/O HCPCS: Performed by: NURSE ANESTHETIST, CERTIFIED REGISTERED

## 2024-07-19 PROCEDURE — 2500000004 HC RX 250 GENERAL PHARMACY W/ HCPCS (ALT 636 FOR OP/ED): Performed by: NURSE ANESTHETIST, CERTIFIED REGISTERED

## 2024-07-19 PROCEDURE — 74270 X-RAY XM COLON 1CNTRST STD: CPT

## 2024-07-19 PROCEDURE — 93010 ELECTROCARDIOGRAM REPORT: CPT | Performed by: INTERNAL MEDICINE

## 2024-07-19 PROCEDURE — 2020000001 HC ICU ROOM DAILY

## 2024-07-19 PROCEDURE — 83735 ASSAY OF MAGNESIUM: CPT | Performed by: SURGERY

## 2024-07-19 PROCEDURE — 2500000004 HC RX 250 GENERAL PHARMACY W/ HCPCS (ALT 636 FOR OP/ED): Performed by: SURGERY

## 2024-07-19 PROCEDURE — 7100000002 HC RECOVERY ROOM TIME - EACH INCREMENTAL 1 MINUTE: Performed by: SURGERY

## 2024-07-19 PROCEDURE — 3700000002 HC GENERAL ANESTHESIA TIME - EACH INCREMENTAL 1 MINUTE: Performed by: SURGERY

## 2024-07-19 PROCEDURE — 80053 COMPREHEN METABOLIC PANEL: CPT | Performed by: SURGERY

## 2024-07-19 PROCEDURE — 36415 COLL VENOUS BLD VENIPUNCTURE: CPT | Performed by: SURGERY

## 2024-07-19 PROCEDURE — 2500000004 HC RX 250 GENERAL PHARMACY W/ HCPCS (ALT 636 FOR OP/ED): Performed by: ANESTHESIOLOGY

## 2024-07-19 PROCEDURE — 84443 ASSAY THYROID STIM HORMONE: CPT | Mod: PORLAB | Performed by: INTERNAL MEDICINE

## 2024-07-19 PROCEDURE — 83605 ASSAY OF LACTIC ACID: CPT | Performed by: SURGERY

## 2024-07-19 PROCEDURE — 85025 COMPLETE CBC W/AUTO DIFF WBC: CPT | Performed by: SURGERY

## 2024-07-19 PROCEDURE — 85730 THROMBOPLASTIN TIME PARTIAL: CPT | Performed by: SURGERY

## 2024-07-19 PROCEDURE — 2720000007 HC OR 272 NO HCPCS: Performed by: SURGERY

## 2024-07-19 PROCEDURE — 99233 SBSQ HOSP IP/OBS HIGH 50: CPT | Performed by: SURGERY

## 2024-07-19 PROCEDURE — 3700000001 HC GENERAL ANESTHESIA TIME - INITIAL BASE CHARGE: Performed by: SURGERY

## 2024-07-19 PROCEDURE — 7100000001 HC RECOVERY ROOM TIME - INITIAL BASE CHARGE: Performed by: SURGERY

## 2024-07-19 PROCEDURE — 44320 COLOSTOMY: CPT | Performed by: SURGERY

## 2024-07-19 PROCEDURE — 74270 X-RAY XM COLON 1CNTRST STD: CPT | Performed by: RADIOLOGY

## 2024-07-19 PROCEDURE — 2500000002 HC RX 250 W HCPCS SELF ADMINISTERED DRUGS (ALT 637 FOR MEDICARE OP, ALT 636 FOR OP/ED): Performed by: NURSE PRACTITIONER

## 2024-07-19 PROCEDURE — 82248 BILIRUBIN DIRECT: CPT | Performed by: SURGERY

## 2024-07-19 PROCEDURE — 2550000001 HC RX 255 CONTRASTS: Performed by: SURGERY

## 2024-07-19 PROCEDURE — 99233 SBSQ HOSP IP/OBS HIGH 50: CPT | Performed by: INTERNAL MEDICINE

## 2024-07-19 PROCEDURE — 0D1L0Z4 BYPASS TRANSVERSE COLON TO CUTANEOUS, OPEN APPROACH: ICD-10-PCS | Performed by: SURGERY

## 2024-07-19 PROCEDURE — 85610 PROTHROMBIN TIME: CPT | Performed by: SURGERY

## 2024-07-19 PROCEDURE — 84100 ASSAY OF PHOSPHORUS: CPT | Performed by: SURGERY

## 2024-07-19 PROCEDURE — 84134 ASSAY OF PREALBUMIN: CPT | Mod: PORLAB | Performed by: SURGERY

## 2024-07-19 PROCEDURE — 3600000003 HC OR TIME - INITIAL BASE CHARGE - PROCEDURE LEVEL THREE: Performed by: SURGERY

## 2024-07-19 PROCEDURE — 2500000001 HC RX 250 WO HCPCS SELF ADMINISTERED DRUGS (ALT 637 FOR MEDICARE OP): Performed by: NURSE PRACTITIONER

## 2024-07-19 RX ORDER — OXYCODONE AND ACETAMINOPHEN 5; 325 MG/1; MG/1
1 TABLET ORAL EVERY 4 HOURS PRN
Status: DISCONTINUED | OUTPATIENT
Start: 2024-07-19 | End: 2024-07-19 | Stop reason: HOSPADM

## 2024-07-19 RX ORDER — MORPHINE SULFATE 4 MG/ML
4 INJECTION INTRAVENOUS EVERY 4 HOURS PRN
Status: DISCONTINUED | OUTPATIENT
Start: 2024-07-19 | End: 2024-07-21

## 2024-07-19 RX ORDER — HYDRALAZINE HYDROCHLORIDE 20 MG/ML
5 INJECTION INTRAMUSCULAR; INTRAVENOUS EVERY 30 MIN PRN
Status: DISCONTINUED | OUTPATIENT
Start: 2024-07-19 | End: 2024-07-19 | Stop reason: HOSPADM

## 2024-07-19 RX ORDER — METRONIDAZOLE 500 MG/100ML
500 INJECTION, SOLUTION INTRAVENOUS ONCE
Status: COMPLETED | OUTPATIENT
Start: 2024-07-19 | End: 2024-07-19

## 2024-07-19 RX ORDER — DIPHENHYDRAMINE HYDROCHLORIDE 50 MG/ML
12.5 INJECTION INTRAMUSCULAR; INTRAVENOUS ONCE AS NEEDED
Status: DISCONTINUED | OUTPATIENT
Start: 2024-07-19 | End: 2024-07-19 | Stop reason: HOSPADM

## 2024-07-19 RX ORDER — SUCCINYLCHOLINE CHLORIDE 20 MG/ML
INJECTION INTRAMUSCULAR; INTRAVENOUS AS NEEDED
Status: DISCONTINUED | OUTPATIENT
Start: 2024-07-19 | End: 2024-07-19

## 2024-07-19 RX ORDER — METRONIDAZOLE 500 MG/100ML
500 INJECTION, SOLUTION INTRAVENOUS EVERY 8 HOURS
Status: COMPLETED | OUTPATIENT
Start: 2024-07-19 | End: 2024-07-20

## 2024-07-19 RX ORDER — NORETHINDRONE AND ETHINYL ESTRADIOL 0.5-0.035
KIT ORAL AS NEEDED
Status: DISCONTINUED | OUTPATIENT
Start: 2024-07-19 | End: 2024-07-19

## 2024-07-19 RX ORDER — LEVOTHYROXINE SODIUM ANHYDROUS 100 UG/5ML
44 INJECTION, POWDER, LYOPHILIZED, FOR SOLUTION INTRAVENOUS
Status: DISPENSED | OUTPATIENT
Start: 2024-07-20 | End: 2024-07-23

## 2024-07-19 RX ORDER — MORPHINE SULFATE 2 MG/ML
2 INJECTION, SOLUTION INTRAMUSCULAR; INTRAVENOUS EVERY 5 MIN PRN
Status: DISCONTINUED | OUTPATIENT
Start: 2024-07-19 | End: 2024-07-19 | Stop reason: HOSPADM

## 2024-07-19 RX ORDER — MIDAZOLAM HYDROCHLORIDE 1 MG/ML
INJECTION, SOLUTION INTRAMUSCULAR; INTRAVENOUS AS NEEDED
Status: DISCONTINUED | OUTPATIENT
Start: 2024-07-19 | End: 2024-07-19

## 2024-07-19 RX ORDER — MORPHINE SULFATE 10 MG/ML
4 INJECTION, SOLUTION INTRAMUSCULAR; INTRAVENOUS EVERY 4 HOURS PRN
Status: DISCONTINUED | OUTPATIENT
Start: 2024-07-19 | End: 2024-07-19 | Stop reason: RX

## 2024-07-19 RX ORDER — LIDOCAINE HYDROCHLORIDE 10 MG/ML
0.1 INJECTION, SOLUTION EPIDURAL; INFILTRATION; INTRACAUDAL; PERINEURAL ONCE
Status: DISCONTINUED | OUTPATIENT
Start: 2024-07-19 | End: 2024-07-19 | Stop reason: HOSPADM

## 2024-07-19 RX ORDER — MEPERIDINE HYDROCHLORIDE 25 MG/ML
12.5 INJECTION INTRAMUSCULAR; INTRAVENOUS; SUBCUTANEOUS EVERY 10 MIN PRN
Status: DISCONTINUED | OUTPATIENT
Start: 2024-07-19 | End: 2024-07-19 | Stop reason: HOSPADM

## 2024-07-19 RX ORDER — SODIUM CHLORIDE, SODIUM LACTATE, POTASSIUM CHLORIDE, CALCIUM CHLORIDE 600; 310; 30; 20 MG/100ML; MG/100ML; MG/100ML; MG/100ML
100 INJECTION, SOLUTION INTRAVENOUS CONTINUOUS
Status: DISCONTINUED | OUTPATIENT
Start: 2024-07-19 | End: 2024-07-19

## 2024-07-19 RX ORDER — PHENYLEPHRINE HCL IN 0.9% NACL 1 MG/10 ML
SYRINGE (ML) INTRAVENOUS AS NEEDED
Status: DISCONTINUED | OUTPATIENT
Start: 2024-07-19 | End: 2024-07-19

## 2024-07-19 RX ORDER — CIPROFLOXACIN 2 MG/ML
400 INJECTION, SOLUTION INTRAVENOUS EVERY 12 HOURS
Status: DISCONTINUED | OUTPATIENT
Start: 2024-07-20 | End: 2024-07-20

## 2024-07-19 RX ORDER — DROPERIDOL 2.5 MG/ML
0.62 INJECTION, SOLUTION INTRAMUSCULAR; INTRAVENOUS ONCE AS NEEDED
Status: DISCONTINUED | OUTPATIENT
Start: 2024-07-19 | End: 2024-07-19 | Stop reason: HOSPADM

## 2024-07-19 RX ORDER — ONDANSETRON HYDROCHLORIDE 2 MG/ML
4 INJECTION, SOLUTION INTRAVENOUS ONCE AS NEEDED
Status: DISCONTINUED | OUTPATIENT
Start: 2024-07-19 | End: 2024-07-19 | Stop reason: HOSPADM

## 2024-07-19 RX ORDER — PANTOPRAZOLE SODIUM 40 MG/10ML
40 INJECTION, POWDER, LYOPHILIZED, FOR SOLUTION INTRAVENOUS DAILY
Status: DISCONTINUED | OUTPATIENT
Start: 2024-07-19 | End: 2024-07-23

## 2024-07-19 RX ORDER — ACETAMINOPHEN 10 MG/ML
1000 INJECTION, SOLUTION INTRAVENOUS EVERY 8 HOURS
Status: DISCONTINUED | OUTPATIENT
Start: 2024-07-19 | End: 2024-07-23

## 2024-07-19 RX ORDER — PROPOFOL 10 MG/ML
INJECTION, EMULSION INTRAVENOUS AS NEEDED
Status: DISCONTINUED | OUTPATIENT
Start: 2024-07-19 | End: 2024-07-19

## 2024-07-19 RX ORDER — FENTANYL CITRATE 50 UG/ML
INJECTION, SOLUTION INTRAMUSCULAR; INTRAVENOUS AS NEEDED
Status: DISCONTINUED | OUTPATIENT
Start: 2024-07-19 | End: 2024-07-19

## 2024-07-19 RX ORDER — LIDOCAINE HYDROCHLORIDE 20 MG/ML
INJECTION, SOLUTION INFILTRATION; PERINEURAL AS NEEDED
Status: DISCONTINUED | OUTPATIENT
Start: 2024-07-19 | End: 2024-07-19

## 2024-07-19 RX ORDER — METOCLOPRAMIDE HYDROCHLORIDE 5 MG/ML
10 INJECTION INTRAMUSCULAR; INTRAVENOUS EVERY 8 HOURS SCHEDULED
Status: DISCONTINUED | OUTPATIENT
Start: 2024-07-19 | End: 2024-07-24

## 2024-07-19 RX ORDER — ENOXAPARIN SODIUM 100 MG/ML
40 INJECTION SUBCUTANEOUS EVERY 24 HOURS
Status: DISCONTINUED | OUTPATIENT
Start: 2024-07-19 | End: 2024-07-19 | Stop reason: SDUPTHER

## 2024-07-19 RX ORDER — FAMOTIDINE 10 MG/ML
20 INJECTION INTRAVENOUS ONCE
Status: COMPLETED | OUTPATIENT
Start: 2024-07-19 | End: 2024-07-19

## 2024-07-19 RX ORDER — MORPHINE SULFATE 10 MG/ML
2 INJECTION, SOLUTION INTRAMUSCULAR; INTRAVENOUS EVERY 4 HOURS PRN
Status: DISCONTINUED | OUTPATIENT
Start: 2024-07-19 | End: 2024-07-21

## 2024-07-19 RX ORDER — ROCURONIUM BROMIDE 10 MG/ML
INJECTION, SOLUTION INTRAVENOUS AS NEEDED
Status: DISCONTINUED | OUTPATIENT
Start: 2024-07-19 | End: 2024-07-19

## 2024-07-19 RX ORDER — LABETALOL HYDROCHLORIDE 5 MG/ML
5 INJECTION, SOLUTION INTRAVENOUS ONCE AS NEEDED
Status: DISCONTINUED | OUTPATIENT
Start: 2024-07-19 | End: 2024-07-19 | Stop reason: HOSPADM

## 2024-07-19 RX ORDER — CIPROFLOXACIN 2 MG/ML
400 INJECTION, SOLUTION INTRAVENOUS ONCE
Status: COMPLETED | OUTPATIENT
Start: 2024-07-19 | End: 2024-07-19

## 2024-07-19 RX ORDER — ALBUTEROL SULFATE 0.83 MG/ML
2.5 SOLUTION RESPIRATORY (INHALATION) ONCE AS NEEDED
Status: DISCONTINUED | OUTPATIENT
Start: 2024-07-19 | End: 2024-07-19 | Stop reason: HOSPADM

## 2024-07-19 RX ORDER — SODIUM CHLORIDE, SODIUM LACTATE, POTASSIUM CHLORIDE, CALCIUM CHLORIDE 600; 310; 30; 20 MG/100ML; MG/100ML; MG/100ML; MG/100ML
100 INJECTION, SOLUTION INTRAVENOUS CONTINUOUS
Status: DISCONTINUED | OUTPATIENT
Start: 2024-07-19 | End: 2024-07-19 | Stop reason: HOSPADM

## 2024-07-19 SDOH — HEALTH STABILITY: MENTAL HEALTH: CURRENT SMOKER: 1

## 2024-07-19 ASSESSMENT — PAIN - FUNCTIONAL ASSESSMENT
PAIN_FUNCTIONAL_ASSESSMENT: 0-10
PAIN_FUNCTIONAL_ASSESSMENT: CPOT (CRITICAL CARE PAIN OBSERVATION TOOL)
PAIN_FUNCTIONAL_ASSESSMENT: 0-10

## 2024-07-19 ASSESSMENT — PAIN SCALES - GENERAL
PAINLEVEL_OUTOF10: 3
PAINLEVEL_OUTOF10: 7
PAIN_LEVEL: 4
PAINLEVEL_OUTOF10: 6
PAINLEVEL_OUTOF10: 8
PAINLEVEL_OUTOF10: 7
PAINLEVEL_OUTOF10: 7
PAINLEVEL_OUTOF10: 0 - NO PAIN
PAINLEVEL_OUTOF10: 9

## 2024-07-19 ASSESSMENT — PAIN DESCRIPTION - LOCATION
LOCATION: ABDOMEN

## 2024-07-19 ASSESSMENT — PAIN SCALES - WONG BAKER
WONGBAKER_NUMERICALRESPONSE: HURTS WHOLE LOT
WONGBAKER_NUMERICALRESPONSE: HURTS EVEN MORE

## 2024-07-19 ASSESSMENT — PAIN DESCRIPTION - ORIENTATION: ORIENTATION: MID

## 2024-07-19 NOTE — ANESTHESIA POSTPROCEDURE EVALUATION
Patient: Barbara Sow    Procedure Summary       Date: 07/19/24 Room / Location: [facility] OR 02 / Virtual [facility] OR    Anesthesia Start: 1226 Anesthesia Stop: 1518    Procedure: Exploration Laparotomy, transverse colostomy (BOOKWALTER) Diagnosis:       Large bowel obstruction (Multi)      (Large bowel obstruction (Multi) [K56.609])    Surgeons: Sharee Freeman MD Responsible Provider: SILVIA Toro    Anesthesia Type: general ASA Status: 2 - Emergent            Anesthesia Type: general    Vitals Value Taken Time   /75 07/19/24 1601   Temp 36.9 °C (98.4 °F) 07/19/24 1516   Pulse 65 07/19/24 1607   Resp 9 07/19/24 1607   SpO2 95 % 07/19/24 1607   Vitals shown include unfiled device data.    Anesthesia Post Evaluation    Patient location during evaluation: PACU  Patient participation: complete - patient participated  Level of consciousness: awake  Pain score: 4  Pain management: adequate  Airway patency: patent  Cardiovascular status: acceptable  Respiratory status: acceptable  Hydration status: acceptable  Postoperative Nausea and Vomiting: none    No notable events documented.

## 2024-07-19 NOTE — ANESTHESIA PROCEDURE NOTES
Airway  Date/Time: 7/19/2024 12:34 PM  Urgency: emergent    Airway not difficult    Staffing  Performed: CRNA   Authorized by: SILVIA Islas    Performed by: SILVIA Islas  Patient location during procedure: OR    Consent for Airway (if performed for an anesthetic, see related documentation for consents)  Patient identity confirmed: verbally with patient  Consent: No emergent situation. Verbal consent obtained.  Consent given by: patient      Indications and Patient Condition  Indications for airway management: anesthesia and airway protection  Spontaneous ventilation: present  Sedation level: deep  Preoxygenated: yes  Patient position: sniffing  MILS maintained throughout  Mask difficulty assessment: 0 - not attempted    Final Airway Details  Final airway type: endotracheal airway      Successful airway: ETT     Successful intubation technique: direct laryngoscopy  Facilitating devices/methods: intubating stylet and cricoid pressure  Endotracheal tube insertion site: oral  Blade: Jamey  Blade size: #3  ETT size (mm): 7.0  Cormack-Lehane Classification: grade I - full view of glottis  Placement verified by: chest auscultation and capnometry   Number of attempts at approach: 1

## 2024-07-19 NOTE — PROGRESS NOTES
7/19/24 1030   07/19/24 1208   Discharge Planning   Living Arrangements Alone   Support Systems Children   Assistance Needed None   Type of Residence Private residence   Number of Stairs to Enter Residence 7   Number of Stairs Within Residence 0   Do you have animals or pets at home? No   Home or Post Acute Services None   Expected Discharge Disposition Home   Does the patient need discharge transport arranged? No     Spoke with pt. Pt from home and lives in alone in an apartment. Pt appeared alert and oriented. Pt states being independent and richelle use of any assistive devices. Confirmed PCP Dr. Christensen. Pt states still drives and is able to obtain meds and get to appointments. Pt richelle any home going needs. Pt aware to reach out if needs arise.   Sigrid De La Vega RN, BSN, [place]/ Il[facility] LEON Supervisor

## 2024-07-19 NOTE — ANESTHESIA PREPROCEDURE EVALUATION
Patient: Barbara Sow    Procedure Information       Date/Time: 07/19/24 1200    Procedure: Exploration Laparotomy, transverse colostomy (BOOKWALTER)    Location: [facility] OR 02 / Virtual [facility] OR    Surgeons: Sharee Freeman MD            Relevant Problems   Anesthesia (within normal limits)      Neuro   (+) Anxiety   (+) Bilateral sciatica   (+) Right cervical radiculopathy   (+) Right lumbar radiculopathy      Endocrine   (+) Hypothyroidism      Musculoskeletal   (+) Lumbar degenerative disc disease   (+) OA (osteoarthritis) of knee   (+) Osteoarthritis cervical spine   (+) Osteoarthritis of finger      HEENT   (+) Acute sinusitis      ID   (+) Cold sore       Clinical information reviewed:   Tobacco  Allergies  Meds  Problems  Med Hx  Surg Hx  OB Status    Fam Hx  Soc Hx        NPO Detail:  NPO/Void Status  Date of Last Liquid: 07/18/24  Time of Last Liquid: 0500  Date of Last Solid: 07/18/24  Time of Last Solid: 0500         Physical Exam    Airway  Mallampati: II  TM distance: >3 FB  Neck ROM: full     Cardiovascular - normal exam     Dental - normal exam     Pulmonary - normal exam     Abdominal   Abdomen: tender         Anesthesia Plan    History of general anesthesia?: yes  History of complications of general anesthesia?: no    ASA 2 - emergent     general   (Bilateral TAP Block)  The patient is a current smoker.  Patient was previously instructed to abstain from smoking on day of procedure.  Patient did not smoke on day of procedure.    intravenous induction   Postoperative administration of opioids is intended.  Anesthetic plan and risks discussed with patient.  Use of blood products discussed with patient who.    Plan discussed with CRNA.

## 2024-07-19 NOTE — PROGRESS NOTES
Barbara Sow is a 64 y.o. female on day 1 of admission presenting with Abdominal pain.      Subjective   Barbara Sow is a 64 y.o. female with PMHx of Hypothyroidism anxiety, depression , prior ileus and multiple prior abdominal surgeries who presented with abdominal pain for about two days and increased distention starting last night. She has not been able to have a bowel movement in a little over 24 hours. Her abdomen has become significantly distended. She has not vomited in some time but still feels very nauseous. ED workup was significant for K 5.0, Na 128 and a UA suggestive of UTI but patient is not having any urinary symptoms. . CT imaging revealed dilated stool-filled colon with transition point in the region of the splenic flexure, suggesting at least partial obstruction. She was admitted to the surgery service and we are consulted for medical management. Remainder of ROS reviewed and negative except as indicated in HPI.    7/19: Patient was seen and examined. Drowsy and barely arousable. S/p exploratory lap and colostomy placement She is hemodynamically stable.Still NPO and NG tube in situ. WE will follow gen surgery recommendations regarding bowel management. Continue IV Abx and IVF. Repeat CBC and BMP.    Objective     Last Recorded Vitals  /87   Pulse 67   Temp 36.9 °C (98.4 °F) (Temporal)   Resp 18   Wt 59 kg (130 lb)   SpO2 96%   Intake/Output last 3 Shifts:    Intake/Output Summary (Last 24 hours) at 7/19/2024 1748  Last data filed at 7/19/2024 1642  Gross per 24 hour   Intake 6501.67 ml   Output 1670 ml   Net 4831.67 ml       Admission Weight  Weight: 59 kg (130 lb) (07/18/24 0913)    Daily Weight  07/18/24 : 59 kg (130 lb)    Image Results  ECG 12 Lead  Sinus bradycardia  Incomplete right bundle branch block  Anterior infarct , age undetermined  T wave abnormality, consider lateral ischemia  Abnormal ECG    Confirmed by Salina Smith ([id number]) on 7/19/2024 2:28:06 PM  ECG  12 lead  Sinus rhythm  RSR' in V1 or V2, probably normal variant  Borderline T wave abnormalities    See ED provider note for full interpretation and clinical correlation  Confirmed by Jacquie Bryan ([id number]) on 7/19/2024 12:39:55 PM  FL enema single contrast water soluble  Narrative: Interpreted By:  Abram Brody,   STUDY:  FL ENEMA SINGLE CONTRAST WATER SOLUBLE; ;  7/19/2024 9:06 am      INDICATION:  Signs/Symptoms:Eval for splenic flexure mass.      COMPARISON:  07/18/2024 abdomen/pelvis CT      ACCESSION NUMBER(S):  [id number]      ORDERING CLINICIAN:  ALTON MCGREGOR      TECHNIQUE:  The fluoroscopic time is 3 minutes 3 seconds with DAP 2,062 uGym2.      Iodine contrast enema was performed.      FINDINGS:  Contrast was advanced from the rectum to the splenic flexure where an  irregular stricture prevented further contrast advancement. A trace  amount of contrast passed across the stricture/mass.      No mass of the descending or sigmoid colon is noted. Small amounts of  stool were scattered through the left colon.      Impression: The splenic flexure has a mass/stricture with severe stenosis; only a  trace amount of contrast could be passed across the stenosis towards  the transverse colon.          MACRO:  Critical Finding:  See findings. Notification was initiated on  7/19/2024 at 9:28 am by  Abram Brody.  (**-OCF-**)      Signed by: Abram Brody 7/19/2024 9:28 AM  Dictation workstation:   [id number]      Physical Exam  Constitutional: Well developed, awake, alert, calm, oriented x4, no acute distress, cooperative   Eyes: EOMI, clear sclera   ENMT: mucous membranes moist, no lesions seen   Head/Neck: Neck supple, no apparent injury, head atraumatic   Respiratory/Thorax: CTAB, good chest expansion, respirations even and unlabored   Cardiovascular: Regular rate and rhythm, no murmurs/rubs/gallops, normal S1 and S 2   Gastrointestinal: Clean and intact wound dressing with Colostomy in situ    Musculoskeletal: ROM intact, no joint swelling, normal  strength   Extremities: no cyanosis, edema, contusions or clubbing   Neurological: no focal deficit, pt alert and oriented x4   Psychological: Appropriate affect and behavior, pleasant   Skin: Warm and dry, no lesions, no rashes  Genitourinary: Larkin draining clear yellow urine      Relevant Results               Assessment/Plan                  Principal Problem:    Abdominal pain  Active Problems:    Bilateral sciatica    Hypothyroidism    Anxiety    Large bowel obstruction (Multi)    Large bowel obstruction abdominal pain  Status post exploratory laparotomy transverse colostomy  Procedure well-tolerated  Pain management per Primary surgery team  Patient remains n.p.o.; diet advancement per general surgery recommendations  IV antibiotics ciprofloxacin  IV maintenance fluids  Trend CBC and BMP daily        Hypothyroidism  Levothyroxine seems to IV in view of n.p.o.  TSH in a.m.     Anxiety /Sciatica  Continue Cymbalta when taking PO     Tobacco dependence  Cessation encouraged       Spent 35 minutes in the follow-up management of this patient today.       Kassie Villalobos MD

## 2024-07-19 NOTE — BRIEF OP NOTE
Date: 2024 - 2024  OR Location: [facility]    Name: Barbara Sow, : 1960, Age: 64 y.o., MRN: [id number], Sex: female    Diagnosis  Pre-op Diagnosis      * Large bowel obstruction (Multi) [K56.609] Post-op Diagnosis     * Large bowel obstruction (Multi) [K56.609]     Procedures  Transverse colostomy      Surgeons      * Sharee Freeman - Primary    Resident/Fellow/Other Assistant:  Surgeons and Role:     Topher Sheppard MD    Procedure Summary  Anesthesia: Anesthesia type not filed in the log.  ASA: II  Anesthesia Staff: CRNA: Krystyna Navarrete, APRN-CRNA; Sabino Beth APRN-CRNA  Estimated Blood Loss: 50mL  Intra-op Medications:   Administrations occurring from 1200 to 1430 on 24:   Medication Name Total Dose   DULoxetine (Cymbalta) DR capsule 60 mg Cannot be calculated   enoxaparin (Lovenox) syringe 40 mg Cannot be calculated   gabapentin (Neurontin) tablet 1,200 mg Cannot be calculated   gabapentin (Neurontin) tablet 1,200 mg Cannot be calculated   lactated Ringer's infusion 3.33 mL   levothyroxine (Synthroid, Levoxyl) tablet 88 mcg Cannot be calculated   morphine injection 2 mg Cannot be calculated   morphine injection 4 mg Cannot be calculated   ondansetron (Zofran) injection 4 mg Cannot be calculated   famotidine PF (Pepcid) injection 20 mg 20 mg              Anesthesia Record               Intraprocedure I/O Totals       None           Specimen: No specimens collected     Staff:   Scrub Person: Jo  Circulator: Aarti  Circulator: Zenia  Scrub Person: Zayra          Findings: Dilated transverse colon.     Complications:  None; patient tolerated the procedure well.     Disposition: PACU - hemodynamically stable.  Condition: stable  Specimens Collected: No specimens collected  Attending Attestation: I was present and scrubbed for the entire procedure.    Sharee Freeman  Phone Number: [phone]

## 2024-07-19 NOTE — INTERVAL H&P NOTE
H&P reviewed. The patient was examined and there are no changes to the H&P.  Contrast enema showing narrowing at splenic flex with proximal dilation.    Will perform blowhole colostomy for decompression.  Will need definitive workup for colon narrowing.

## 2024-07-19 NOTE — ANESTHESIA PROCEDURE NOTES
"Peripheral Block    Patient location during procedure: OR  Start time: 7/19/2024 12:38 PM  End time: 7/19/2024 12:39 PM  Reason for block: at surgeon's request and post-op pain management  Staffing  Performed: CRNA   Authorized by: SILVIA Islas    Performed by: SILVIA Islas  Preanesthetic Checklist  Completed: patient identified, IV checked, site marked, risks and benefits discussed, surgical consent, monitors and equipment checked, pre-op evaluation and timeout performed   Timeout performed at: 7/19/2024 12:38 PM  Peripheral Block  Patient position: laying flat  Prep: ChloraPrep  Patient monitoring: continuous pulse ox, cardiac monitor and heart rate  Block type: TAP  Laterality: B/L  Injection technique: single-shot  Guidance: ultrasound guided  Local infiltration: ropivacaine  Infiltration strength: 0.5 %  Dose: 60 mL  Needle  Needle type: 100mm pajunk stim.   Needle localization: anatomical landmarks and ultrasound guidance  Assessment  Injection assessment: negative aspiration for heme, no paresthesia on injection, incremental injection and local visualized surrounding nerve on ultrasound  Paresthesia pain: none  Heart rate change: no  Slow fractionated injection: yes  Additional Notes  Anesthesia consult was placed by  _Aguilar_____for post procedural analgesia.  The patient's chart was reviewed and they were deemed an appropriate candidate for the procedure. The patient was educated in detail on the risks, benefits, and alternatives to the block including but not limited to: temporary or permanent nerve damage, bleeding, infection, damage to surrounding tissues, possible block failure and the potential for local anesthesia toxicity syndrome.  The patient expressed understanding and all questions were answered prior to the initiation of the procedure.  Informed consent was also signed by the patient and laterality determined per institutional policy.  A formal \"time out " "\"consistent with the institutions rules and regulations was performed by the anesthesia provider and appropriate RN.     Procedure  The patient was placed in the supine position. The patient was marked bilaterally and skin prep applied and allowed to dry. Proper monitors were applied with oxygen.  The patient was placed under general anesthesia (see induction note).     A high frequency ultrasound probe with probe cover was placed over the three layers of the abdominal musculature and grossly identified using sterile coupling gel following institutional skin prep. The three layers of the abdominal musculature were carefully identified SUBCOSTAL/ MIDAXILLARY region.  An ECHOGENIC BLOCK NEEDLE was then advanced maintaining an in-plane visualization throughout the procedure, under ultrasound guidance from medial to lateral to come to rest just deep to the internal oblique muscle. Upon negative aspiration, 5ml 2% lidocaine 30 ml 0.5% Ropivacaine with 4mg decadron preservative free was administered safely and cautiously on each side. Aspiration every 3-5ml was done to avoid potential intravascular injection.  All injections were done without resistance and were free of blood. The patient tolerated the procedure well. No complications noted.              "

## 2024-07-19 NOTE — PROGRESS NOTES
Social work consult placed for positive medical risk screen. SW reviewed pt's chart and communicated with TCC. SW notified by CT Supervisor, REAL De La Vega, pt denied needing any resources and lives alone in apartment at home with no home going needs. No SW needs foreseen at this time. SW signing off; available upon request.    Leatha Quevedo, MSW, LSW ([id number])   Care Transitions

## 2024-07-19 NOTE — PROGRESS NOTES
"Barbara Sow is a 64 y.o. female on day 1 of admission presenting with Abdominal pain.    Subjective   No acute events overnight, patient denies any flatus or bowel movements       Objective     Physical Exam  HENT:      Head: Normocephalic.      Right Ear: Tympanic membrane normal.      Nose: Nose normal.      Mouth/Throat:      Mouth: Mucous membranes are moist.   Eyes:      Pupils: Pupils are equal, round, and reactive to light.   Abdominal:      Comments: Markedly distended, tender to epigastrum, no rebound or guarding.   Musculoskeletal:         General: Normal range of motion.   Skin:     General: Skin is warm.      Capillary Refill: Capillary refill takes less than 2 seconds.   Neurological:      General: No focal deficit present.      Mental Status: She is alert.         Last Recorded Vitals  Blood pressure 125/75, pulse 68, temperature 37.1 °C (98.8 °F), resp. rate 18, height 1.702 m (5' 7\"), weight 59 kg (130 lb), SpO2 93%.  Intake/Output last 3 Shifts:  I/O last 3 completed shifts:  In: 1807.3 (30.6 mL/kg) [IV Piggyback:1807.3]  Out: - (0 mL/kg)   Weight: 59 kg     Relevant Results           This patient currently has cardiac telemetry ordered; if you would like to modify or discontinue the telemetry order, click here to go to the orders activity to modify/discontinue the order.                 Assessment/Plan   Principal Problem:    Abdominal pain  Active Problems:    Bilateral sciatica    Hypothyroidism    Anxiety    63yo female who presents with LBO 2/2 possible splenic flexture lesion. Initial plan to prep for endoscopy. Patient obstipated and may require surgical intervention if unable to tolerate prep/ edoscopy.    Plan:  -NPO  -Soft prep for possible endoscopy NO GI coverage this weekend  -IVF  -discuss possibility of sugery  -IMS consulted    Patient will be discussed with Attending Physician Dr. Lydia Sheppard PGY4  General Surgery Service       "

## 2024-07-19 NOTE — NURSING NOTE
This patient  did not complete drinking the polyethylene glycol that was prepared for her. Gave patient the container and poured two tall cups of the solution. Through the night patient was on to take in approximately 1500. Initially patient stated that should could not drink it if it  was not cold.. Gave patient ice.  She ate all the ice and the majority of the solution..

## 2024-07-19 NOTE — OP NOTE
Exploration Laparotomy, transverse colostomy ([facility]) Operative Note     Date: 2024 - 2024  OR Location: [facility]    Name: Barbara Sow, : 1960, Age: 64 y.o., MRN: [id number], Sex: female    Diagnosis  Pre-op Diagnosis      * Large bowel obstruction (Multi) [K56.609] Post-op Diagnosis     * Large bowel obstruction (Multi) [K56.609]     Procedures  Exploration Laparotomy, transverse colostomy ([facility])  27880 - [place] EXPLORATORY LAPAROTOMY CELIOTOMY W/WO BIOPSY SPX      Surgeons      * Sharee Freeman - Primary    Resident/Fellow/Other Assistant:  Surgeons and Role:  Topher Sheppard MD    Procedure Summary  Anesthesia: Anesthesia type not filed in the log.  ASA: II  Anesthesia Staff: CRNA: Krystyna Navarrete, APRN-CRNA; Sabino Beth, APRN-CRNA  Estimated Blood Loss: 50mL  Intra-op Medications:   Administrations occurring from 1200 to 1430 on 24:   Medication Name Total Dose   DULoxetine (Cymbalta) DR capsule 60 mg Cannot be calculated   enoxaparin (Lovenox) syringe 40 mg Cannot be calculated   gabapentin (Neurontin) tablet 1,200 mg Cannot be calculated   gabapentin (Neurontin) tablet 1,200 mg Cannot be calculated   lactated Ringer's infusion 3.33 mL   levothyroxine (Synthroid, Levoxyl) tablet 88 mcg Cannot be calculated   morphine injection 2 mg Cannot be calculated   morphine injection 4 mg Cannot be calculated   ondansetron (Zofran) injection 4 mg Cannot be calculated   famotidine PF (Pepcid) injection 20 mg 20 mg              Anesthesia Record               Intraprocedure I/O Totals       None           Specimen: No specimens collected     Staff:   Scrub Person: Jo  Circulator: Aarti  Circulator: Zenia  Scrub Person: Zayra         Drains and/or Catheters:   Urethral Catheter Latex 12 Fr. (Active)     Indications: Barbara Sow is an 64 y.o. female who is having surgery for Large bowel obstruction (Multi) [K56.609].     The patient was seen in the preoperative area. The  risks, benefits, complications, treatment options, non-operative alternatives, expected recovery and outcomes were discussed with the patient. The possibilities of reaction to medication, pulmonary aspiration, injury to surrounding structures, bleeding, recurrent infection, the need for additional procedures, failure to diagnose a condition, and creating a complication requiring transfusion or operation were discussed with the patient. The patient concurred with the proposed plan, giving informed consent.  The site of surgery was properly noted/marked if necessary per policy. The patient has been actively warmed in preoperative area. Preoperative antibiotics have been ordered and given within 1 hours of incision. Venous thrombosis prophylaxis have been ordered including bilateral sequential compression devices    Procedure Details:   The patient was brought to the operating room, transferred onto the table, and positioned supine with bilateral arms out.  An informal huddle was performed.  General endotracheal anaesthesia was induced.  IV antibiotics were infused.  A Larkin catheter was placed under sterile precautions.  The abdomen was prepped and draped in the usual sterile fashion.  A formal timeout was performed.  An upper midline mini-laparotomy incision was made.  Entry into the peritoneal cavity was made with electrocautery - there were no obvious injuries to abdominal contents upon entry.  On gross inspection, there was a dilated and distended transverse colon in the epigastrium.  The stomach was visualised and palpated superior to that.  The NGT tip was confirmed intra-operatively.  The transverse colon was brought up through the skin defect.  A small hole was made in the mesentery of the chosen transverse colon through which an ostomy bridge was passed through.  The bridge was secured to the skin using a #2-0 nylon suture.  The fascial defect was then closed using #1-0 PDS sutures in a figure-of-eight  "fashion.  The subcutaneous tissue was reapproximated in a similar fashion but using #3-0 vicryl sutures.  The loop (\"blowhole\") transverse colostomy was matured with #3-0 vicryl and #3-0 chromic sutures in a Ashely fashion.  A colostomy appliance was placed over the ostomy.  The patient was extubated and taken to the PACU safely.  All needles, instruments, and sponges were correct to the count.  I was present for the entire procedure.    Complications:  None; patient tolerated the procedure well.    Disposition: PACU - hemodynamically stable.  Condition: stable         Attending Attestation: I was present and scrubbed for the entire procedure.    Sharee Freeman  Phone Number: [phone]      "

## 2024-07-20 LAB
ANION GAP SERPL CALC-SCNC: 10 MMOL/L (ref 10–20)
BASOPHILS # BLD AUTO: 0.05 X10*3/UL (ref 0–0.1)
BASOPHILS NFR BLD AUTO: 0.5 %
BUN SERPL-MCNC: 10 MG/DL (ref 6–23)
CALCIUM SERPL-MCNC: 7 MG/DL (ref 8.6–10.3)
CHLORIDE SERPL-SCNC: 104 MMOL/L (ref 98–107)
CO2 SERPL-SCNC: 24 MMOL/L (ref 21–32)
CREAT SERPL-MCNC: 0.56 MG/DL (ref 0.5–1.05)
EGFRCR SERPLBLD CKD-EPI 2021: >90 ML/MIN/1.73M*2
EOSINOPHIL # BLD AUTO: 0.02 X10*3/UL (ref 0–0.7)
EOSINOPHIL NFR BLD AUTO: 0.2 %
ERYTHROCYTE [DISTWIDTH] IN BLOOD BY AUTOMATED COUNT: 14.8 % (ref 11.5–14.5)
GLUCOSE SERPL-MCNC: 112 MG/DL (ref 74–99)
HCT VFR BLD AUTO: 30.5 % (ref 36–46)
HGB BLD-MCNC: 9.7 G/DL (ref 12–16)
IMM GRANULOCYTES # BLD AUTO: 0.02 X10*3/UL (ref 0–0.7)
IMM GRANULOCYTES NFR BLD AUTO: 0.2 % (ref 0–0.9)
LYMPHOCYTES # BLD AUTO: 1.59 X10*3/UL (ref 1.2–4.8)
LYMPHOCYTES NFR BLD AUTO: 16.9 %
MAGNESIUM SERPL-MCNC: 1.7 MG/DL (ref 1.6–2.4)
MCH RBC QN AUTO: 29.2 PG (ref 26–34)
MCHC RBC AUTO-ENTMCNC: 31.8 G/DL (ref 32–36)
MCV RBC AUTO: 92 FL (ref 80–100)
MONOCYTES # BLD AUTO: 0.84 X10*3/UL (ref 0.1–1)
MONOCYTES NFR BLD AUTO: 8.9 %
NEUTROPHILS # BLD AUTO: 6.89 X10*3/UL (ref 1.2–7.7)
NEUTROPHILS NFR BLD AUTO: 73.3 %
NRBC BLD-RTO: 0 /100 WBCS (ref 0–0)
PHOSPHATE SERPL-MCNC: 2.4 MG/DL (ref 2.5–4.9)
PLATELET # BLD AUTO: 541 X10*3/UL (ref 150–450)
POTASSIUM SERPL-SCNC: 3.4 MMOL/L (ref 3.5–5.3)
RBC # BLD AUTO: 3.32 X10*6/UL (ref 4–5.2)
SODIUM SERPL-SCNC: 135 MMOL/L (ref 136–145)
TSH SERPL-ACNC: 19.58 MIU/L (ref 0.44–3.98)
WBC # BLD AUTO: 9.4 X10*3/UL (ref 4.4–11.3)

## 2024-07-20 PROCEDURE — C9113 INJ PANTOPRAZOLE SODIUM, VIA: HCPCS | Performed by: SURGERY

## 2024-07-20 PROCEDURE — 84100 ASSAY OF PHOSPHORUS: CPT | Performed by: SURGERY

## 2024-07-20 PROCEDURE — 99233 SBSQ HOSP IP/OBS HIGH 50: CPT | Performed by: INTERNAL MEDICINE

## 2024-07-20 PROCEDURE — 36415 COLL VENOUS BLD VENIPUNCTURE: CPT | Performed by: SURGERY

## 2024-07-20 PROCEDURE — 80048 BASIC METABOLIC PNL TOTAL CA: CPT | Performed by: SURGERY

## 2024-07-20 PROCEDURE — 2500000005 HC RX 250 GENERAL PHARMACY W/O HCPCS: Performed by: SURGERY

## 2024-07-20 PROCEDURE — 2500000004 HC RX 250 GENERAL PHARMACY W/ HCPCS (ALT 636 FOR OP/ED): Performed by: SURGERY

## 2024-07-20 PROCEDURE — 83735 ASSAY OF MAGNESIUM: CPT | Performed by: SURGERY

## 2024-07-20 PROCEDURE — 2500000004 HC RX 250 GENERAL PHARMACY W/ HCPCS (ALT 636 FOR OP/ED): Performed by: INTERNAL MEDICINE

## 2024-07-20 PROCEDURE — 85025 COMPLETE CBC W/AUTO DIFF WBC: CPT | Performed by: SURGERY

## 2024-07-20 PROCEDURE — 99024 POSTOP FOLLOW-UP VISIT: CPT | Performed by: SURGERY

## 2024-07-20 PROCEDURE — 1100000001 HC PRIVATE ROOM DAILY

## 2024-07-20 RX ORDER — DEXTROSE MONOHYDRATE, SODIUM CHLORIDE, AND POTASSIUM CHLORIDE 50; 1.49; 9 G/1000ML; G/1000ML; G/1000ML
30 INJECTION, SOLUTION INTRAVENOUS CONTINUOUS
Status: DISPENSED | OUTPATIENT
Start: 2024-07-20 | End: 2024-07-22

## 2024-07-20 RX ORDER — POTASSIUM CHLORIDE 14.9 MG/ML
20 INJECTION INTRAVENOUS
Status: DISPENSED | OUTPATIENT
Start: 2024-07-20 | End: 2024-07-20

## 2024-07-20 RX ORDER — HYDROMORPHONE HYDROCHLORIDE 0.2 MG/ML
0.2 INJECTION INTRAMUSCULAR; INTRAVENOUS; SUBCUTANEOUS EVERY 6 HOURS PRN
Status: DISCONTINUED | OUTPATIENT
Start: 2024-07-20 | End: 2024-07-21

## 2024-07-20 RX ORDER — LIDOCAINE 560 MG/1
2 PATCH PERCUTANEOUS; TOPICAL; TRANSDERMAL DAILY
Status: DISCONTINUED | OUTPATIENT
Start: 2024-07-20 | End: 2024-07-25 | Stop reason: HOSPADM

## 2024-07-20 RX ORDER — MAGNESIUM SULFATE HEPTAHYDRATE 40 MG/ML
2 INJECTION, SOLUTION INTRAVENOUS ONCE
Status: COMPLETED | OUTPATIENT
Start: 2024-07-20 | End: 2024-07-20

## 2024-07-20 ASSESSMENT — COGNITIVE AND FUNCTIONAL STATUS - GENERAL
MOBILITY SCORE: 24
MOBILITY SCORE: 24
DAILY ACTIVITIY SCORE: 24
DAILY ACTIVITIY SCORE: 24

## 2024-07-20 ASSESSMENT — PAIN DESCRIPTION - LOCATION
LOCATION: ABDOMEN
LOCATION: BACK
LOCATION: ABDOMEN

## 2024-07-20 ASSESSMENT — PAIN - FUNCTIONAL ASSESSMENT
PAIN_FUNCTIONAL_ASSESSMENT: 0-10

## 2024-07-20 ASSESSMENT — PAIN DESCRIPTION - ORIENTATION
ORIENTATION: MID
ORIENTATION: MID
ORIENTATION: RIGHT;LEFT

## 2024-07-20 ASSESSMENT — PAIN SCALES - GENERAL
PAINLEVEL_OUTOF10: 5 - MODERATE PAIN
PAINLEVEL_OUTOF10: 7
PAINLEVEL_OUTOF10: 9
PAINLEVEL_OUTOF10: 9
PAINLEVEL_OUTOF10: 8
PAINLEVEL_OUTOF10: 0 - NO PAIN
PAINLEVEL_OUTOF10: 9
PAINLEVEL_OUTOF10: 3
PAINLEVEL_OUTOF10: 7
PAINLEVEL_OUTOF10: 8
PAINLEVEL_OUTOF10: 6
PAINLEVEL_OUTOF10: 5 - MODERATE PAIN
PAINLEVEL_OUTOF10: 9
PAINLEVEL_OUTOF10: 9

## 2024-07-20 ASSESSMENT — PAIN SCALES - PAIN ASSESSMENT IN ADVANCED DEMENTIA (PAINAD)
TOTALSCORE: MEDICATION (SEE MAR)

## 2024-07-20 NOTE — PROGRESS NOTES
Barbara Sow is a 64 y.o. female admitted for Abdominal pain. Pharmacy reviewed the patient's [id number] medications and allergies for accuracy.    The list below reflects the PTA list prior to pharmacy medication history. A summary a changes to the PTA medication list has been listed below. Please review each medication in order reconciliation for additional clarification and justification.    Source of information:  T2P  Medications added:    Medications modified:  Gabapentin 600mg bid --> 600mg 2t am and 3t pm  Levothyroxine 88mcg every day --> 88 mcg every day except Sunday take 2 tabs    Medications to be removed:    Medications of concern:      Prior to Admission Medications   Prescriptions Last Dose Informant Patient Reported? Taking?   DULoxetine (Cymbalta) 60 mg DR capsule   Yes No   Sig: Take 1 capsule (60 mg) by mouth once daily. Do not crush or chew.   gabapentin (Neurontin) 600 mg tablet   Yes No   Sig: Take 1 tablet (600 mg) by mouth 2 times a day. Take three tabs in the morning and 2 tabs at night.   levothyroxine (Tirosint) 88 mcg capsule Unknown  Yes No   Sig: Take 1 capsule (88 mcg) by mouth early in the morning.. Take on an empty stomach at the same time each day, either 30 to 60 minutes prior to breakfast      Facility-Administered Medications: None       Melanie Sellers

## 2024-07-20 NOTE — PROGRESS NOTES
[facility]  GENERAL SURGERY - PROGRESS NOTE    Patient Name: Barbara Sow  MRN: [id number]  Admit Date: 718  : 1960  AGE: 64 y.o.   GENDER: female    CHIEF COMPLAINT / EVENTS LAST 24HRS / HPI:  No acute events overnight patient is now status post diverting loop ileostomy    MEDICAL HISTORY / ROS:   Admission history and ROS reviewed. Pertinent changes as follows:      Review of Systems   Constitutional:   Negative for fever, chills, weight loss.   HENT:  Negative for congestion and dental problem.    Eyes:  Negative for discharge and itching.   Respiratory: . Negative for apnea, choking and wheezing.    Cardiovascular:  Negative for palpitations and leg swelling.   Gastrointestinal: Positive for abdominal pain  Endocrine: Negative for cold intolerance and heat intolerance.   Musculoskeletal:  Negative for neck pain.   Skin:  Negative for color change.   Neurological:  Negative for tingling, loss of consciousness and numbness.   Psychiatric/Behavioral:  Negative for agitation and behavioral problems.      PHYSICAL EXAM:  Heart Rate:  [52-76]   Temp:  [36.3 °C (97.3 °F)-37 °C (98.6 °F)]   Resp:  [7-18]   BP: (111-161)/(70-98)   Weight:  [66.7 kg (147 lb 1.6 oz)]   SpO2:  [82 %-100 %]   Physical Exam  HENT:      Head: Normocephalic.      Nose:      Comments: Nasogastric tube with bilious output     Mouth/Throat:      Mouth: Mucous membranes are moist.   Eyes:      Pupils: Pupils are equal, round, and reactive to light.   Cardiovascular:      Rate and Rhythm: Normal rate.   Abdominal:      General: Abdomen is flat.      Comments: Soft appropriately tender to palpation ostomy with liquid brown stool in bag edematous but pink mucosa   Musculoskeletal:      Cervical back: Normal range of motion.   Skin:     General: Skin is warm.      Capillary Refill: Capillary refill takes less than 2 seconds.   Neurological:      General: No focal deficit present.      Mental Status: She  is alert.   Psychiatric:         Mood and Affect: Mood normal.         I have reviewed all medications, laboratory results, and imaging pertinent for today's encounter.    ==============================================================================  TODAY'S ASSESSMENT AND PLAN OF CARE:  Patient is a 64-year-old female who came with a large bowel obstruction who is now status post loop ileostomy.    Plan:  -NPO  -NGT to LWIS would leave for at least 24 hours.  -Larkin to be removed today  -24hr periop abx  -IVF  -DVT prophylaxis  -[facility] transfer to [facility]    Patient will be discussed with Attending Physician Dr. Lydia Sheppard PGY4  General Surgery Service   ==============================================================================

## 2024-07-20 NOTE — CARE PLAN
The patient's goals for the shift include      The clinical goals for the shift include pt will remain safe during shift      Problem: Pain - Adult  Goal: Verbalizes/displays adequate comfort level or baseline comfort level  7/20/2024 1308 by Rylee Serrato RN  Outcome: Progressing  7/20/2024 1308 by Rylee Serrato RN  Outcome: Progressing     Problem: Safety - Adult  Goal: Free from fall injury  7/20/2024 1308 by Rylee Serrato RN  Outcome: Progressing  7/20/2024 1308 by Rylee Serrato RN  Outcome: Progressing     Problem: Discharge Planning  Goal: Discharge to home or other facility with appropriate resources  7/20/2024 1308 by Rylee Serrato RN  Outcome: Progressing  7/20/2024 1308 by Rylee Serrato RN  Outcome: Progressing     Problem: Chronic Conditions and Co-morbidities  Goal: Patient's chronic conditions and co-morbidity symptoms are monitored and maintained or improved  7/20/2024 1308 by Rylee Serrato RN  Outcome: Progressing  7/20/2024 1308 by Rylee Serrato RN  Outcome: Progressing     Problem: Skin  Goal: Decreased wound size/increased tissue granulation at next dressing change  Outcome: Progressing  Flowsheets (Taken 7/20/2024 1308)  Decreased wound size/increased tissue granulation at next dressing change: Promote sleep for wound healing  Goal: Participates in plan/prevention/treatment measures  Outcome: Progressing  Flowsheets (Taken 7/20/2024 1308)  Participates in plan/prevention/treatment measures:   Elevate heels   Discuss with provider PT/OT consult  Goal: Prevent/manage excess moisture  Outcome: Progressing  Flowsheets (Taken 7/20/2024 1308)  Prevent/manage excess moisture:   Follow provider orders for dressing changes   Moisturize dry skin   Monitor for/manage infection if present  Goal: Prevent/minimize sheer/friction injuries  Outcome: Progressing  Flowsheets (Taken 7/20/2024 1308)  Prevent/minimize sheer/friction injuries:   Increase activity/out of bed for meals    Use pull sheet  Goal: Promote/optimize nutrition  Outcome: Progressing  Goal: Promote skin healing  Outcome: Progressing  Flowsheets (Taken 7/20/2024 1308)  Promote skin healing:   Turn/reposition every 2 hours/use positioning/transfer devices   Protective dressings over bony prominences   Pt resting in bed. No complaints of pain
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
## 2024-07-21 VITALS
BODY MASS INDEX: 22.49 KG/M2 | RESPIRATION RATE: 18 BRPM | WEIGHT: 143.3 LBS | HEART RATE: 68 BPM | TEMPERATURE: 97.8 F | SYSTOLIC BLOOD PRESSURE: 150 MMHG | HEIGHT: 67 IN | DIASTOLIC BLOOD PRESSURE: 89 MMHG | OXYGEN SATURATION: 92 %

## 2024-07-21 LAB
ANION GAP SERPL CALC-SCNC: 10 MMOL/L (ref 10–20)
BASOPHILS # BLD AUTO: 0.11 X10*3/UL (ref 0–0.1)
BASOPHILS NFR BLD AUTO: 1.3 %
BUN SERPL-MCNC: 5 MG/DL (ref 6–23)
CALCIUM SERPL-MCNC: 7.5 MG/DL (ref 8.6–10.3)
CHLORIDE SERPL-SCNC: 102 MMOL/L (ref 98–107)
CO2 SERPL-SCNC: 28 MMOL/L (ref 21–32)
CREAT SERPL-MCNC: 0.44 MG/DL (ref 0.5–1.05)
EGFRCR SERPLBLD CKD-EPI 2021: >90 ML/MIN/1.73M*2
EOSINOPHIL # BLD AUTO: 0.09 X10*3/UL (ref 0–0.7)
EOSINOPHIL NFR BLD AUTO: 1 %
ERYTHROCYTE [DISTWIDTH] IN BLOOD BY AUTOMATED COUNT: 14.8 % (ref 11.5–14.5)
GLUCOSE SERPL-MCNC: 96 MG/DL (ref 74–99)
HCT VFR BLD AUTO: 31.6 % (ref 36–46)
HGB BLD-MCNC: 10.3 G/DL (ref 12–16)
IMM GRANULOCYTES # BLD AUTO: 0.02 X10*3/UL (ref 0–0.7)
IMM GRANULOCYTES NFR BLD AUTO: 0.2 % (ref 0–0.9)
LYMPHOCYTES # BLD AUTO: 1.85 X10*3/UL (ref 1.2–4.8)
LYMPHOCYTES NFR BLD AUTO: 21.1 %
MAGNESIUM SERPL-MCNC: 1.81 MG/DL (ref 1.6–2.4)
MCH RBC QN AUTO: 29.5 PG (ref 26–34)
MCHC RBC AUTO-ENTMCNC: 32.6 G/DL (ref 32–36)
MCV RBC AUTO: 91 FL (ref 80–100)
MONOCYTES # BLD AUTO: 0.55 X10*3/UL (ref 0.1–1)
MONOCYTES NFR BLD AUTO: 6.3 %
NEUTROPHILS # BLD AUTO: 6.14 X10*3/UL (ref 1.2–7.7)
NEUTROPHILS NFR BLD AUTO: 70.1 %
NRBC BLD-RTO: 0 /100 WBCS (ref 0–0)
PHOSPHATE SERPL-MCNC: 1.1 MG/DL (ref 2.5–4.9)
PLATELET # BLD AUTO: 549 X10*3/UL (ref 150–450)
POTASSIUM SERPL-SCNC: 3.6 MMOL/L (ref 3.5–5.3)
RBC # BLD AUTO: 3.49 X10*6/UL (ref 4–5.2)
SODIUM SERPL-SCNC: 136 MMOL/L (ref 136–145)
WBC # BLD AUTO: 8.8 X10*3/UL (ref 4.4–11.3)

## 2024-07-21 PROCEDURE — 2500000004 HC RX 250 GENERAL PHARMACY W/ HCPCS (ALT 636 FOR OP/ED): Performed by: SURGERY

## 2024-07-21 PROCEDURE — 1100000001 HC PRIVATE ROOM DAILY

## 2024-07-21 PROCEDURE — 83735 ASSAY OF MAGNESIUM: CPT | Performed by: SURGERY

## 2024-07-21 PROCEDURE — 85025 COMPLETE CBC W/AUTO DIFF WBC: CPT | Performed by: INTERNAL MEDICINE

## 2024-07-21 PROCEDURE — 2500000005 HC RX 250 GENERAL PHARMACY W/O HCPCS: Performed by: SURGERY

## 2024-07-21 PROCEDURE — 97161 PT EVAL LOW COMPLEX 20 MIN: CPT | Mod: GP | Performed by: PHYSICAL THERAPIST

## 2024-07-21 PROCEDURE — 82374 ASSAY BLOOD CARBON DIOXIDE: CPT | Performed by: SURGERY

## 2024-07-21 PROCEDURE — 99232 SBSQ HOSP IP/OBS MODERATE 35: CPT | Performed by: INTERNAL MEDICINE

## 2024-07-21 PROCEDURE — 36415 COLL VENOUS BLD VENIPUNCTURE: CPT | Performed by: INTERNAL MEDICINE

## 2024-07-21 PROCEDURE — 84100 ASSAY OF PHOSPHORUS: CPT | Performed by: SURGERY

## 2024-07-21 PROCEDURE — C9113 INJ PANTOPRAZOLE SODIUM, VIA: HCPCS | Performed by: SURGERY

## 2024-07-21 RX ORDER — MORPHINE SULFATE 2 MG/ML
2 INJECTION, SOLUTION INTRAMUSCULAR; INTRAVENOUS
Status: DISCONTINUED | OUTPATIENT
Start: 2024-07-21 | End: 2024-07-23

## 2024-07-21 RX ORDER — MORPHINE SULFATE 4 MG/ML
4 INJECTION INTRAVENOUS
Status: DISCONTINUED | OUTPATIENT
Start: 2024-07-21 | End: 2024-07-23

## 2024-07-21 ASSESSMENT — PAIN - FUNCTIONAL ASSESSMENT
PAIN_FUNCTIONAL_ASSESSMENT: 0-10

## 2024-07-21 ASSESSMENT — COGNITIVE AND FUNCTIONAL STATUS - GENERAL
MOVING TO AND FROM BED TO CHAIR: A LITTLE
STANDING UP FROM CHAIR USING ARMS: A LITTLE
DAILY ACTIVITIY SCORE: 24
MOBILITY SCORE: 19
MOBILITY SCORE: 24
WALKING IN HOSPITAL ROOM: A LITTLE
DAILY ACTIVITIY SCORE: 24
CLIMB 3 TO 5 STEPS WITH RAILING: A LOT
MOBILITY SCORE: 24

## 2024-07-21 ASSESSMENT — PAIN SCALES - GENERAL
PAINLEVEL_OUTOF10: 8
PAINLEVEL_OUTOF10: 4
PAINLEVEL_OUTOF10: 9
PAINLEVEL_OUTOF10: 8
PAINLEVEL_OUTOF10: 8
PAINLEVEL_OUTOF10: 9
PAINLEVEL_OUTOF10: 7
PAINLEVEL_OUTOF10: 8
PAINLEVEL_OUTOF10: 8
PAINLEVEL_OUTOF10: 7
PAINLEVEL_OUTOF10: 9
PAINLEVEL_OUTOF10: 9
PAINLEVEL_OUTOF10: 8
PAINLEVEL_OUTOF10: 7
PAINLEVEL_OUTOF10: 7
PAINLEVEL_OUTOF10: 9
PAINLEVEL_OUTOF10: 8

## 2024-07-21 ASSESSMENT — PAIN DESCRIPTION - LOCATION
LOCATION: ABDOMEN

## 2024-07-21 NOTE — PROGRESS NOTES
Physical Therapy    Physical Therapy Evaluation    Patient Name: Barbara Sow  MRN: [id number]  Today's Date: 7/21/2024   Time Calculation  Start Time: 1130  Stop Time: 1151  Time Calculation (min): 21 min  3322/3322-A    Assessment/Plan   PT Assessment  PT Assessment Results: Decreased mobility, Pain  Rehab Prognosis: Excellent  Barriers to Discharge: none  Evaluation/Treatment Tolerance: Patient tolerated treatment well  End of Session Communication: Bedside nurse  Assessment Comment: anticipate pt will be prepared for home after several PT gait training sessions, transition to nursing assist for amb  End of Session Patient Position: Up in chair, Alarm off, not on at start of session  IP OR SWING BED PT PLAN  Inpatient or Swing Bed: Inpatient  PT Plan  Treatment/Interventions: Transfer training, Gait training, Stair training  PT Plan: Ongoing PT  PT Frequency: 3 times per week  PT Discharge Recommendations: No PT needed after discharge  PT Recommended Transfer Status: Stand by assist  PT - OK to Discharge: Yes    Subjective     Current Problem:  1. Abdominal pain        2. Intestinal volvulus (Multi)        3. Large bowel obstruction (Multi)  Case Request Operating Room: Exploration Laparotomy, transverse colostomy    Case Request Operating Room: Exploration Laparotomy, transverse colostomy      4. Mobility impaired          General Visit Information:  General  Reason for Referral: abd pain/distension. ABD SURGERY 7/19 loop transverse colostomy creation  Referred By: ALECIA MCGREGOR. PT FOR RECENT SURG: ABD  Family/Caregiver Present: No  Prior to Session Communication: Bedside nurse (approved PT, clamped NG and gave pain meds before PT)  Patient Position Received: Bed, 3 rail up, Alarm off, not on at start of session  General Comment: pt seen in 3322, alert and highly motivated to walk. RN states pt has been getting to [facility] indep.    Home Living:  Home Living  Type of Home: Apartment  Lives With: Alone  Home  Adaptive Equipment: None  Home Layout: One level  Home Access: Stairs to enter with rails  Entrance Stairs-Number of Steps: 7    Prior Level of Function:  Prior Function Per Pt/Caregiver Report  Level of [place]: Independent with ADLs and functional transfers, Independent with homemaking with ambulation  Ambulatory Assistance: Independent    Precautions:  Precautions  Medical Precautions: Abdominal precautions (ORDERS: walk at least TID, sit in chair after walking, OK to clamp NG for amb)  Precautions Comment: new colostomy pouch, NG TUBE *can be clampted for amb       Objective     Pain:  Pain Assessment  Pain Assessment: 0-10  0-10 (Numeric) Pain Score: 8  Pain Type: Surgical pain  Pain Location: Abdomen  Pain Orientation: Mid (colostomy area)  Pain Interventions: Ambulation/increased activity  Response to Interventions: improved comfort    Cognition:  Cognition  Orientation Level: Oriented X4    General Assessments:  General Observation  General Observation: transfer OOB, gait training hallway, set up in chair   Activity Tolerance  Endurance: Endurance does not limit participation in activity     Dynamic Sitting Balance  Dynamic Sitting-Comments: good  Dynamic Standing Balance  Dynamic Standing-Comments: fair- walker support initially postop    Functional Assessments:     Bed Mobility 1  Bed Mobility 1: Supine to sitting  Level of Assistance 1: Modified independent  Transfer 1  Transfer From 1: Bed to  Transfer to 1: Chair with arms  Technique 1: Sit to stand, Stand to sit  Transfer Level of Assistance 1: Contact guard, Close supervision  Ambulation/Gait Training 1  Device 1: Rolling walker  Assistance 1: Contact guard, Close supervision  Quality of Gait 1: Forward flexed posture  Comments/Distance (ft) 1: 200  Stairs  Stairs: No       Extremity/Trunk Assessments:        RLE   RLE : Within Functional Limits  LLE   LLE : Within Functional Limits    Outcome Measures:     [facility] [address] [facility] Basic Mobility  Turning from your  back to your side while in a flat bed without using bedrails: None  Moving from lying on your back to sitting on the side of a flat bed without using bedrails: None  Moving to and from bed to chair (including a wheelchair): A little  Standing up from a chair using your arms (e.g. wheelchair or bedside chair): A little  To walk in hospital room: A little  Climbing 3-5 steps with railing: A lot  Basic Mobility - Total Score: 19       Goals:  Encounter Problems       Encounter Problems (Active)       Mobility       STG - Patient will ambulate community distance progress from walker to NO AD       Start:  07/21/24    Expected End:  07/28/24            STG - Patient will navigate 4-6 steps with rails/device to enter/exit APT        Start:  07/21/24    Expected End:  07/28/24               PT Transfers       STG - Patient to transfer to and from sit to supine with demo LOG ROLL TECH post ABD surg       Start:  07/21/24    Expected End:  07/28/24               Pain - Adult            Education Documentation  Body Mechanics, taught by Poonam Lopez, PT at 7/21/2024  1:00 PM.  Learner: Patient  Readiness: Eager  Method: Explanation, Demonstration  Response: Verbalizes Understanding, Needs Reinforcement  Comment: body mech post abd surg    Mobility Training, taught by Poonam Lopez, PT at 7/21/2024  1:00 PM.  Learner: Patient  Readiness: Eager  Method: Explanation, Demonstration  Response: Verbalizes Understanding, Needs Reinforcement  Comment: body mech post abd surg    Education Comments  No comments found.
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
## 2024-07-21 NOTE — CARE PLAN
The patient's goals for the shift include      The clinical goals for the shift include Pt will remain at a tolerable pain level throughout shift.

## 2024-07-21 NOTE — PROGRESS NOTES
[facility]  GENERAL SURGERY - PROGRESS NOTE    Patient Name: Barbara Sow  MRN: [id number]  Admit Date: 718  : 1960  AGE: 64 y.o.   GENDER: female    CHIEF COMPLAINT / EVENTS LAST 24HRS / HPI:  No acute events overnight patient is now status post diverting loop ileostomy.      MEDICAL HISTORY / ROS:   Admission history and ROS reviewed. Pertinent changes as follows:      Review of Systems   Constitutional:   Negative for fever, chills, weight loss.   HENT:  Negative for congestion and dental problem.    Eyes:  Negative for discharge and itching.   Respiratory: . Negative for apnea, choking and wheezing.    Cardiovascular:  Negative for palpitations and leg swelling.   Gastrointestinal: Positive for abdominal pain  Endocrine: Negative for cold intolerance and heat intolerance.   Musculoskeletal:  Negative for neck pain.   Skin:  Negative for color change.   Neurological:  Negative for tingling, loss of consciousness and numbness.   Psychiatric/Behavioral:  Negative for agitation and behavioral problems.      PHYSICAL EXAM:  Heart Rate:  [54-84]   Temp:  [36 °C (96.8 °F)-36.6 °C (97.9 °F)]   Resp:  [10-20]   BP: (123-149)/(69-76)   SpO2:  [91 %-99 %]   Physical Exam  HENT:      Head: Normocephalic.      Nose:      Comments: Nasogastric tube with bilious output     Mouth/Throat:      Mouth: Mucous membranes are moist.   Eyes:      Pupils: Pupils are equal, round, and reactive to light.   Cardiovascular:      Rate and Rhythm: Normal rate.   Abdominal:      General: Abdomen is flat.      Comments: Soft appropriately tender to palpation ostomy with bowel sweat in bag.  Mildly distended   Musculoskeletal:      Cervical back: Normal range of motion.   Skin:     General: Skin is warm.      Capillary Refill: Capillary refill takes less than 2 seconds.   Neurological:      General: No focal deficit present.      Mental Status: She is alert.   Psychiatric:         Mood and  Affect: Mood normal.         I have reviewed all medications, laboratory results, and imaging pertinent for today's encounter.    ==============================================================================  TODAY'S ASSESSMENT AND PLAN OF CARE:  Patient is a 64-year-old female who came with a large bowel obstruction who is now status post loop ileostomy.    Plan:  -NPO  -NGT to LWIS   -24hr periop abx  -IVF  -DVT prophylaxis  -Reglan every 8 hours  -IV PPI    Patient will be discussed with Attending Physician Dr. Nilsa Sheppard PGY4  General Surgery Service   ==============================================================================

## 2024-07-21 NOTE — CARE PLAN
The patient's goals for the shift include      The clinical goals for the shift include pt will remain free from falls during this shift      Problem: Pain - Adult  Goal: Verbalizes/displays adequate comfort level or baseline comfort level  Outcome: Progressing     Problem: Safety - Adult  Goal: Free from fall injury  Outcome: Progressing     Problem: Discharge Planning  Goal: Discharge to home or other facility with appropriate resources  Outcome: Progressing     Problem: Chronic Conditions and Co-morbidities  Goal: Patient's chronic conditions and co-morbidity symptoms are monitored and maintained or improved  Outcome: Progressing     Problem: Skin  Goal: Decreased wound size/increased tissue granulation at next dressing change  Outcome: Progressing  Flowsheets (Taken 7/20/2024 1308)  Decreased wound size/increased tissue granulation at next dressing change: Promote sleep for wound healing  Goal: Participates in plan/prevention/treatment measures  Outcome: Progressing  Flowsheets (Taken 7/20/2024 1308)  Participates in plan/prevention/treatment measures:   Elevate heels   Discuss with provider PT/OT consult  Goal: Prevent/manage excess moisture  Outcome: Progressing  Flowsheets (Taken 7/20/2024 1308)  Prevent/manage excess moisture:   Follow provider orders for dressing changes   Moisturize dry skin   Monitor for/manage infection if present  Goal: Prevent/minimize sheer/friction injuries  Outcome: Progressing  Flowsheets (Taken 7/20/2024 1308)  Prevent/minimize sheer/friction injuries:   Increase activity/out of bed for meals   Use pull sheet  Goal: Promote/optimize nutrition  Outcome: Progressing  Flowsheets (Taken 7/21/2024 0812)  Promote/optimize nutrition:   Discuss with provider if NPO > 2 days   Monitor/record intake including meals  Goal: Promote skin healing  Outcome: Progressing  Flowsheets (Taken 7/20/2024 1308)  Promote skin healing:   Turn/reposition every 2 hours/use positioning/transfer devices    Protective dressings over bony prominences   Pt resting in bed

## 2024-07-22 ENCOUNTER — APPOINTMENT (OUTPATIENT)
Dept: RADIOLOGY | Facility: HOSPITAL | Age: 64
End: 2024-07-22
Payer: MEDICARE

## 2024-07-22 LAB
ANION GAP SERPL CALC-SCNC: 12 MMOL/L (ref 10–20)
BASOPHILS # BLD AUTO: 0.15 X10*3/UL (ref 0–0.1)
BASOPHILS NFR BLD AUTO: 1.9 %
BUN SERPL-MCNC: 3 MG/DL (ref 6–23)
CALCIUM SERPL-MCNC: 8 MG/DL (ref 8.6–10.3)
CHLORIDE SERPL-SCNC: 101 MMOL/L (ref 98–107)
CO2 SERPL-SCNC: 27 MMOL/L (ref 21–32)
CREAT SERPL-MCNC: 0.38 MG/DL (ref 0.5–1.05)
EGFRCR SERPLBLD CKD-EPI 2021: >90 ML/MIN/1.73M*2
EOSINOPHIL # BLD AUTO: 0.23 X10*3/UL (ref 0–0.7)
EOSINOPHIL NFR BLD AUTO: 3 %
ERYTHROCYTE [DISTWIDTH] IN BLOOD BY AUTOMATED COUNT: 14.8 % (ref 11.5–14.5)
GLUCOSE SERPL-MCNC: 77 MG/DL (ref 74–99)
HCT VFR BLD AUTO: 31.9 % (ref 36–46)
HGB BLD-MCNC: 9.8 G/DL (ref 12–16)
IMM GRANULOCYTES # BLD AUTO: 0.02 X10*3/UL (ref 0–0.7)
IMM GRANULOCYTES NFR BLD AUTO: 0.3 % (ref 0–0.9)
LYMPHOCYTES # BLD AUTO: 2.43 X10*3/UL (ref 1.2–4.8)
LYMPHOCYTES NFR BLD AUTO: 31.3 %
MAGNESIUM SERPL-MCNC: 1.87 MG/DL (ref 1.6–2.4)
MCH RBC QN AUTO: 29 PG (ref 26–34)
MCHC RBC AUTO-ENTMCNC: 30.7 G/DL (ref 32–36)
MCV RBC AUTO: 94 FL (ref 80–100)
MONOCYTES # BLD AUTO: 0.52 X10*3/UL (ref 0.1–1)
MONOCYTES NFR BLD AUTO: 6.7 %
NEUTROPHILS # BLD AUTO: 4.42 X10*3/UL (ref 1.2–7.7)
NEUTROPHILS NFR BLD AUTO: 56.8 %
NRBC BLD-RTO: 0 /100 WBCS (ref 0–0)
PHOSPHATE SERPL-MCNC: 1.4 MG/DL (ref 2.5–4.9)
PLATELET # BLD AUTO: 510 X10*3/UL (ref 150–450)
POTASSIUM SERPL-SCNC: 3.7 MMOL/L (ref 3.5–5.3)
RBC # BLD AUTO: 3.38 X10*6/UL (ref 4–5.2)
SODIUM SERPL-SCNC: 136 MMOL/L (ref 136–145)
WBC # BLD AUTO: 7.8 X10*3/UL (ref 4.4–11.3)

## 2024-07-22 PROCEDURE — 99232 SBSQ HOSP IP/OBS MODERATE 35: CPT | Performed by: INTERNAL MEDICINE

## 2024-07-22 PROCEDURE — 84100 ASSAY OF PHOSPHORUS: CPT | Performed by: SURGERY

## 2024-07-22 PROCEDURE — 76937 US GUIDE VASCULAR ACCESS: CPT

## 2024-07-22 PROCEDURE — 2500000004 HC RX 250 GENERAL PHARMACY W/ HCPCS (ALT 636 FOR OP/ED): Performed by: STUDENT IN AN ORGANIZED HEALTH CARE EDUCATION/TRAINING PROGRAM

## 2024-07-22 PROCEDURE — 82374 ASSAY BLOOD CARBON DIOXIDE: CPT | Performed by: SURGERY

## 2024-07-22 PROCEDURE — 2500000005 HC RX 250 GENERAL PHARMACY W/O HCPCS: Performed by: STUDENT IN AN ORGANIZED HEALTH CARE EDUCATION/TRAINING PROGRAM

## 2024-07-22 PROCEDURE — C9113 INJ PANTOPRAZOLE SODIUM, VIA: HCPCS | Performed by: SURGERY

## 2024-07-22 PROCEDURE — 83735 ASSAY OF MAGNESIUM: CPT | Performed by: SURGERY

## 2024-07-22 PROCEDURE — 2500000004 HC RX 250 GENERAL PHARMACY W/ HCPCS (ALT 636 FOR OP/ED): Performed by: SURGERY

## 2024-07-22 PROCEDURE — 97116 GAIT TRAINING THERAPY: CPT | Mod: GP,CQ | Performed by: PHYSICAL THERAPY ASSISTANT

## 2024-07-22 PROCEDURE — 85025 COMPLETE CBC W/AUTO DIFF WBC: CPT | Performed by: INTERNAL MEDICINE

## 2024-07-22 PROCEDURE — 74019 RADEX ABDOMEN 2 VIEWS: CPT | Performed by: RADIOLOGY

## 2024-07-22 PROCEDURE — 2500000005 HC RX 250 GENERAL PHARMACY W/O HCPCS: Performed by: SURGERY

## 2024-07-22 PROCEDURE — 74019 RADEX ABDOMEN 2 VIEWS: CPT

## 2024-07-22 PROCEDURE — 97530 THERAPEUTIC ACTIVITIES: CPT | Mod: GP,CQ | Performed by: PHYSICAL THERAPY ASSISTANT

## 2024-07-22 PROCEDURE — 1100000001 HC PRIVATE ROOM DAILY

## 2024-07-22 PROCEDURE — 36415 COLL VENOUS BLD VENIPUNCTURE: CPT | Performed by: SURGERY

## 2024-07-22 PROCEDURE — 99024 POSTOP FOLLOW-UP VISIT: CPT | Performed by: SURGERY

## 2024-07-22 RX ORDER — KETOROLAC TROMETHAMINE 30 MG/ML
30 INJECTION, SOLUTION INTRAMUSCULAR; INTRAVENOUS ONCE
Status: DISCONTINUED | OUTPATIENT
Start: 2024-07-22 | End: 2024-07-23

## 2024-07-22 RX ORDER — KETOROLAC TROMETHAMINE 30 MG/ML
15 INJECTION, SOLUTION INTRAMUSCULAR; INTRAVENOUS EVERY 8 HOURS
Status: DISCONTINUED | OUTPATIENT
Start: 2024-07-22 | End: 2024-07-23

## 2024-07-22 RX ORDER — MAGNESIUM SULFATE HEPTAHYDRATE 40 MG/ML
2 INJECTION, SOLUTION INTRAVENOUS ONCE
Status: COMPLETED | OUTPATIENT
Start: 2024-07-22 | End: 2024-07-22

## 2024-07-22 RX ORDER — POTASSIUM CHLORIDE 14.9 MG/ML
20 INJECTION INTRAVENOUS
Status: ACTIVE | OUTPATIENT
Start: 2024-07-22 | End: 2024-07-22

## 2024-07-22 RX ORDER — POTASSIUM CHLORIDE 14.9 MG/ML
20 INJECTION INTRAVENOUS
Status: COMPLETED | OUTPATIENT
Start: 2024-07-22 | End: 2024-07-22

## 2024-07-22 ASSESSMENT — PAIN SCALES - GENERAL
PAINLEVEL_OUTOF10: 9
PAINLEVEL_OUTOF10: 6
PAINLEVEL_OUTOF10: 7
PAINLEVEL_OUTOF10: 9
PAINLEVEL_OUTOF10: 6
PAINLEVEL_OUTOF10: 9
PAINLEVEL_OUTOF10: 9
PAINLEVEL_OUTOF10: 7
PAINLEVEL_OUTOF10: 7
PAINLEVEL_OUTOF10: 9
PAINLEVEL_OUTOF10: 10 - WORST POSSIBLE PAIN
PAINLEVEL_OUTOF10: 8
PAINLEVEL_OUTOF10: 7
PAINLEVEL_OUTOF10: 5 - MODERATE PAIN
PAINLEVEL_OUTOF10: 9
PAINLEVEL_OUTOF10: 5 - MODERATE PAIN

## 2024-07-22 ASSESSMENT — PAIN DESCRIPTION - DESCRIPTORS
DESCRIPTORS: BURNING;CRAMPING;DISCOMFORT
DESCRIPTORS: STABBING;BURNING
DESCRIPTORS: BURNING;CRAMPING
DESCRIPTORS: BURNING;CRAMPING
DESCRIPTORS: BURNING;STABBING
DESCRIPTORS: BURNING;STABBING
DESCRIPTORS: BURNING;CRAMPING
DESCRIPTORS: BURNING;CRAMPING;CRUSHING

## 2024-07-22 ASSESSMENT — COGNITIVE AND FUNCTIONAL STATUS - GENERAL
MOBILITY SCORE: 23
DAILY ACTIVITIY SCORE: 24
STANDING UP FROM CHAIR USING ARMS: A LITTLE
CLIMB 3 TO 5 STEPS WITH RAILING: A LITTLE
CLIMB 3 TO 5 STEPS WITH RAILING: TOTAL
MOVING TO AND FROM BED TO CHAIR: A LITTLE
DAILY ACTIVITIY SCORE: 24
WALKING IN HOSPITAL ROOM: A LITTLE
MOBILITY SCORE: 24
MOBILITY SCORE: 18
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
## 2024-07-22 NOTE — CONSULTS
Vascular Access Team  Consult     Visit Date: 7/22/2024      Patient Name: Barbara Sow         MRN: [id number]                Reason for Consult: Pt on multiple IV medications, currently only has one IV site, beside staff unable to obtain additional access.        Assessment: US guided IV to left arm, see LDA avatar for details          Jossy Perez RN  7/22/2024  3:21 PM

## 2024-07-22 NOTE — PROGRESS NOTES
Physical Therapy                 Therapy Communication Note    Patient Name: Barbara Sow  MRN: [id number]  Today's Date: 7/22/2024     Discipline: Physical Therapy    Missed Visit Reason: Missed Visit Reason: Patient placed on medical hold (pt agreeable to PT but RN requesting pt hold until all meds given RN Kerline requested PTA retrun in couple hours.)    Missed Time: Attempt    Comment:

## 2024-07-22 NOTE — PROGRESS NOTES
Patient was agreeable to [facility] for education and assistance with ostomy. Patient was provided with a [employer] list of skilled [facility] agencies  that are in network with patient's insurance payor, service patient's preferred geographic region, and that displays CMS star ratings. TCC following.   15:30 pm: Patient confirmed that she would like [facility] on discharge. Advised Dr. Freeman of need upon discharge. Patient denies any additional discharge needs at this time. Patient is aware of Care Transitions if any needs should arise.

## 2024-07-22 NOTE — DOCUMENTATION CLARIFICATION NOTE
PATIENT:               LA NENA ROBIN  [id number]  MRN:                       [id number]  :                       1960  ADMIT DATE:       2024 9:16 AM  DISCH DATE:  RESPONDING PROVIDER #:        [id number]          PROVIDER RESPONSE TEXT:    Metabolic encephalopathy 2/2 hyponatremia    CDI QUERY TEXT:    Clarification        Instruction:    Based on your assessment of the patient and the clinical information, please provide the requested documentation by clicking on the appropriate radio button and enter any additional information if prompted.    Question: Please further clarify the most likely etiology of the altered mental status as    When answering this query, please exercise your independent professional judgment. The fact that a question is being asked, does not imply that any particular answer is desired or expected.    The patient's clinical indicators include:  Clinical Information: 63 yo female admitted with volvus for colostomy.    Clinical Indicators:   Vital signs T37.1 HR68 R18 /75 96 percent 2.5L   note: , Patient was seen and examined. Drowsy and barely arousable. S/p exploratory lap and colostomy placement She is hemodynamically stable.,   note;, Constitutional: Well developed, awake, alert, calm, oriented x4, no acute distress, cooperative,    Treatment:   Surgery transverse colostomy  Dilaudid , morphine  LR IV bolus 1000 ml   ml/hr      Risk Factors: Volvus, smoker , sciatica, OA hyponatremia, hypokalemia  Options provided:  -- Metabolic encephalopathy 2/2 hyponatremia  -- Toxic encephalopathy 2/2 anesthesia and medications  -- Other - I will add my own diagnosis  -- Refer to Clinical Documentation Reviewer    Query created by: Nely Negrete on 2024 7:21 AM      Electronically signed by:  MULUGETA SOTO MD 2024 12:55 PM

## 2024-07-22 NOTE — PROGRESS NOTES
Physical Therapy    Physical Therapy Treatment    Patient Name: Barbara Sow  MRN: [id number]  Today's Date: 7/22/2024  Time Calculation  Start Time: 1137  Stop Time: 1200  Time Calculation (min): 23 min    Assessment/Plan   PT Assessment  End of Session Communication: Bedside nurse  Assessment Comment: pt demonstrating improved gait. She will benefit form additional session of PT.,  End of Session Patient Position: Up in chair, Alarm on     PT Plan  Treatment/Interventions: Transfer training, Gait training, Stair training  PT Plan: Ongoing PT  PT Frequency: 3 times per week  PT Discharge Recommendations: No PT needed after discharge  PT Recommended Transfer Status: Stand by assist  PT - OK to Discharge: Yes      General Visit Information:   PT  Visit  PT Received On: 07/22/24  Response to Previous Treatment: Patient with no complaints from previous session.  General  Reason for Referral: and pain/distension. ABD SURGERY 7/19 loop transverse colostomy creation  Referred By: ALECIA MCGREGOR. PT FOR RECENT SURG: ABD  Missed Visit: Yes  Missed Visit Reason: Patient placed on medical hold (pt agreeable to PT but RN requesting pt hold until all meds given FELICITY Churchill requested PTA retrun in couple hours.)  Patient Position Received: Bed, 3 rail up, Alarm off, not on at start of session  Preferred Learning Style: verbal  General Comment: pt agreeable to PT and cleared by RN. pt reporting she feels better today.    Subjective   Precautions:  Precautions  Medical Precautions: Abdominal precautions  Precautions Comment: new colostomy pouch, NG TUBE *can be clamped for amb    Pain:  Pain Assessment  Pain Assessment: 0-10  0-10 (Numeric) Pain Score: 5 - Moderate pain  Pain Type: Acute pain, Surgical pain  Pain Location: Abdomen  Cognition:  Cognition  Overall Cognitive Status: Within Functional Limits       Treatments:  Bed Mobility  Bed Mobility: Yes  Bed Mobility 1  Bed Mobility 1: Supine to sitting  Level of Assistance 1:  Modified independent    Ambulation/Gait Training  Ambulation/Gait Training Performed: Yes  Ambulation/Gait Training 1  Surface 1: Level tile  Device 1: Rolling walker  Assistance 1: Close supervision  Comments/Distance (ft) 1: 200'x1 and 25'x1  with cues for walker safety and technique.  Transfers  Transfer: Yes  Transfer 1  Technique 1: Sit to stand, Stand to sit  Transfer Device 1: Walker  Transfer Level of Assistance 1: Close supervision  Trials/Comments 1: pt required  cues for safety  Transfers 2  Technique 2: Stand pivot  Transfer Device 2: Walker  Transfer Level of Assistance 2: Close supervision  Trials/Comments 2: cues to keep walker with  her to complete turn    Outcome Measures:  [facility] Basic Mobility  Turning from your back to your side while in a flat bed without using bedrails: None  Moving from lying on your back to sitting on the side of a flat bed without using bedrails: None  Moving to and from bed to chair (including a wheelchair): A little  Standing up from a chair using your arms (e.g. wheelchair or bedside chair): A little  To walk in hospital room: A little  Climbing 3-5 steps with railing: Total  Basic Mobility - Total Score: 18    Education Documentation  Body Mechanics, taught by Marina Golden PTA at 7/22/2024  1:15 PM.  Learner: Patient  Readiness: Acceptance  Method: Explanation  Response: Verbalizes Understanding, Needs Reinforcement    Mobility Training, taught by Marina Golden PTA at 7/22/2024  1:15 PM.  Learner: Patient  Readiness: Acceptance  Method: Explanation  Response: Verbalizes Understanding, Needs Reinforcement    Education Comments  No comments found.               Encounter Problems       Encounter Problems (Active)       Mobility       STG - Patient will ambulate community distance progress from walker to NO AD (Progressing)       Start:  07/21/24    Expected End:  07/28/24            STG - Patient will navigate 4-6 steps with rails/device to enter/exit APT  (Progressing)        Start:  07/21/24    Expected End:  07/28/24               PT Transfers       STG - Patient to transfer to and from sit to supine with demo LOG ROLL TECH post ABD surg (Progressing)       Start:  07/21/24    Expected End:  07/28/24               Pain - Adult

## 2024-07-22 NOTE — CONSULTS
Nutrition Initial Assessment:   Nutrition Assessment    Reason for Assessment: Provider consult order, TPN recommendations    Medical history per chart: Barbara Sow is a 64 y.o. female with PMHx of Hypothyroidism anxiety, depression , prior ileus and multiple prior abdominal surgeries who presented with abdominal pain for about two days and increased distention. CT imaging revealed dilated stool-filled colon with transition point in the region of the splenic flexure, suggesting at least partial obstruction. Exploration laparotomy, transverse colostomy revealed large bowel obstruction 7/19.     7/22: RD consulted for TPN assessment and recommendation. Pt seen this morning, awake and alert at time of visit. Notes recent weight loss of about 10-15#, potentially more but unable to specify. She endorses nausea and abdominal pain associated with her current LBO. She reports that she was eating well PTA, with about 3 meals per day. Denies any food allergies, chewing or swallowing difficulty.   Per Surgery PPN to start tonight, and need for TPN will be reassessed tomorrow.  Will follow for need of TPN.       Nutrition History:        Current Diet: NPO Diet Except: Ice chips; Effective now  Adult Clinimix Parenteral Nutrition  Supplement(s): No  Average meal Intake during admission: NPO   Average supplement intake during admission: none ordered at this time     Nutrition Related Findings:   Oral Symptoms: Teeth: Missing teeth   GI symptoms: nausea, abdominal pain, and constipation.   BM: Last BM Date: 07/16/24  Food allergies: NKFA. is allergic to cefaclor, cefuroxime axetil, and ketorolac.  Meds/Labs reviewed.  acetaminophen, 1,000 mg, intravenous, q8h  enoxaparin, 40 mg, subcutaneous, q24h  ketorolac, 15 mg, intravenous, q8h  ketorolac, 30 mg, intravenous, Once  levothyroxine, 44 mcg, intravenous, q24h ROXANA  lidocaine, 2 patch, transdermal, Daily  metoclopramide, 10 mg, intravenous, q8h ROXANA  nicotine, 1 patch,  "transdermal, Daily   Followed by  [START ON 8/29/2024] nicotine, 1 patch, transdermal, Daily   Followed by  [START ON 9/12/2024] nicotine, 1 patch, transdermal, Daily  pantoprazole, 40 mg, intravenous, Daily       Adult Clinimix Parenteral Nutrition, 40 mL/hr  potassium chloride-D5-0.9%NaCl, 30 mL/hr, Last Rate: 30 mL/hr (07/22/24 1533)         Nutrition Significant Labs:  Results from last 7 days   Lab Units 07/22/24  0201 07/21/24  0554 07/20/24  0559   GLUCOSE mg/dL 77 96 112*   SODIUM mmol/L 136 136 135*   POTASSIUM mmol/L 3.7 3.6 3.4*   CHLORIDE mmol/L 101 102 104   CO2 mmol/L 27 28 24   BUN mg/dL 3* 5* 10   CREATININE mg/dL 0.38* 0.44* 0.56   EGFR mL/min/1.73m*2 >90 >90 >90   CALCIUM mg/dL 8.0* 7.5* 7.0*   PHOSPHORUS mg/dL 1.4* 1.1* 2.4*   MAGNESIUM mg/dL 1.87 1.81 1.70     Lab Results   Component Value Date    HGBA1C 5.6 04/25/2024           Anthropometrics:  Height: 170.2 cm (5' 7\")   Weight: 65 kg (143 lb 4.8 oz)   BMI (Calculated): 22.44  IBW/kg (Dietitian Calculated): 61.4 kg  Percent of IBW: 106 %          Weight History:   Wt Readings from Last 10 Encounters:   07/21/24 65 kg (143 lb 4.8 oz)   04/25/24 56.2 kg (124 lb)   08/23/22 66.7 kg (147 lb)        Weight Change %:  Weight History / % Weight Change: Limited weight hx available to assess wt trends. Pt does endorse loss of 10-15# with UBW of around 130-135#, unable to confirm accuracy in weight records. Noted sig gain of 15.3% since April of 2024 (4/25/24, 124#; 7/21/24, 143#). ? accuracy, gain may be r/t current LBO and sig constipation. Will monitor wt trends during admission.  Significant Weight Loss: No          Nutrition Focused Physical Exam Findings:  Subcutaneous Fat Loss:   Orbital Fat Pads: Mild-Moderate (slight dark circles and slight hollowing)  Buccal Fat Pads: Mild-Moderate (flat cheeks, minimal bounce)  Triceps: Mild-Moderate (less than ample fat tissue)  Muscle Wasting:  Temporalis: Mild-Moderate (slight depression)  Pectoralis " (Clavicular Region): Severe (protruding prominent clavicle)  Interosseous: Mild-Moderate (slightly depressed area between thumb and forefinger)  Quadriceps: Severe (depressions on inner and outer thigh)  Gastrocnemius: Severe (minimal muscle definition)  Edema:  Edema: none  Physical Findings:  Skin: Positive (Incision wound to abdomen. Multiple scratches/scabs to lower legs)    Estimated Needs:   Total Energy Estimated Needs (kCal):  (3324-5872)  Method for Estimating Needs: 25-30kcal/kg, current weight  Total Protein Estimated Needs (g):  (65-78)  Method for Estimating Needs: 1-1.2g/kg, current weight  Total Fluid Estimated Needs (mL):  (1763-4711)  Method for Estimating Needs: 1mL/kcal        Nutrition Diagnosis   Nutrition Diagnosis:  Malnutrition Diagnosis  Patient has Malnutrition Diagnosis:  (Unable to accurately assess)    Nutrition Diagnosis  Patient has Nutrition Diagnosis: Yes  Diagnosis Status (1): New  Nutrition Diagnosis 1: Inadequate oral intake  Related to (1): NPO status with need for TPN  As Evidenced by (1): inability to consume nutrition orally d/t reduced bowel function  Additional Nutrition Diagnosis: Diagnosis 2       Nutrition Interventions/Recommendations   Nutrition Interventions and Recommendations:        Nutrition Prescription:  Individualized Nutrition Prescription Provided for : TPN recommendation        Nutrition Interventions:   Food and/or Nutrient Delivery Interventions  Interventions: Parenteral nutrition/ IV fluids  Parenteral Nutrition/IV Fluids: Modify composition of parenteral nutrition  Formula: TPN standard - AA 5%, dextrose 15%  Electrolytes: Standard  Elements: Multivitamin  Glucose Infusion Rate (GIR) (mg/kg/min): 3.1 mg/kg/min  Parenteral Additional Recommendations: Continue PPN 1L of 5%Dex, 4.25% AA, and if TPN needed recommend goal of 2L of 15% Dex, 5% AA, and Lipids 3 times per week to provide a daily average of 1634 kcals, 100 gm protein  Additional Interventions:   (TPN goal: 2L of 15% Dex, 5% AA, and Lipids 3 times per week)    Coordination of Nutrition Care by a Nutrition Professional  Collaboration and Referral of Nutrition Care: Collaboration by nutrition professional with other providers  Goal: Dr. Freeman    Nutrition Education:   Education Documentation  No documentation found.      Nutrition Counseling  Counseling Theoretical Approach: Other (Comment)  Goal: Discussed need for parenteral nutrition       Nutrition Monitoring and Evaluation   Monitoring/Evaluation:   Food/Nutrient Related History Monitoring  Monitoring and Evaluation Plan: Enteral and parenteral nutrition intake  Enteral and Parenteral Nutrition Intake: Parenteral nutrition formula/solution  Criteria: Parenteral nutrition infusion meets >75% estimated energy needs    Body Composition/Growth/Weight History  Monitoring and Evaluation Plan: Weight  Weight: Measured weight  Criteria: Stable weight, monitor trends    Biochemical Data, Medical Tests and Procedures  Monitoring and Evaluation Plan: Electrolyte/renal panel, Glucose/endocrine profile  Electrolyte and Renal Panel: BUN, Calcium, serum, Chloride, Creatinine, Magnesium, Phosphorus, Potassium, Sodium  Criteria: WNL  Glucose/Endocrine Profile: Glucose, casual  Criteria: WNL    Nutrition Focused Physical Findings  Monitoring and Evaluation Plan: Skin  Skin: Impaired wound healing (incision wound)  Criteria: Promote healing            Time Spent/Follow-up Reminder:   Follow Up  Time Spent (min): 60 minutes  Last Date of Nutrition Visit: 07/22/24  Nutrition Follow-Up Needed?: Dietitian to reassess per policy  Follow up Comment: 7/24-7/25

## 2024-07-22 NOTE — PROGRESS NOTES
[facility]  GENERAL SURGERY - PROGRESS NOTE    Patient Name: Barbara Sow  MRN: [id number]  Admit Date: 718  : 1960  AGE: 64 y.o.   GENDER: female    CHIEF COMPLAINT / EVENTS LAST 24HRS / HPI:  No acute events overnight patient is now status post blowhole colostomy.  Patient had 50 cc mostly bowel sweat out of the bag.  Patient subjectively feels better however is awaiting return of bowel function.    MEDICAL HISTORY / ROS:   Admission history and ROS reviewed. Pertinent changes as follows:      Review of Systems   Constitutional:   Negative for fever, chills, weight loss.   HENT:  Negative for congestion and dental problem.    Eyes:  Negative for discharge and itching.   Respiratory: . Negative for apnea, choking and wheezing.    Cardiovascular:  Negative for palpitations and leg swelling.   Gastrointestinal: Positive for abdominal pain  Endocrine: Negative for cold intolerance and heat intolerance.   Musculoskeletal:  Negative for neck pain.   Skin:  Negative for color change.   Neurological:  Negative for tingling, loss of consciousness and numbness.   Psychiatric/Behavioral:  Negative for agitation and behavioral problems.      PHYSICAL EXAM:  Heart Rate:  [56-68]   Temp:  [35.6 °C (96 °F)-36.6 °C (97.8 °F)]   Resp:  [16-18]   BP: (118-159)/(58-89)   SpO2:  [91 %-95 %]   Physical Exam  HENT:      Head: Normocephalic.      Nose:      Comments: Nasogastric tube in place     Mouth/Throat:      Mouth: Mucous membranes are moist.   Eyes:      Pupils: Pupils are equal, round, and reactive to light.   Cardiovascular:      Rate and Rhythm: Normal rate.   Abdominal:      General: Abdomen is flat.      Comments: Soft appropriately tender to palpation ostomy with bowel sweat in bag.  Mildly distended   Musculoskeletal:      Cervical back: Normal range of motion.   Skin:     General: Skin is warm.      Capillary Refill: Capillary refill takes less than 2 seconds.    Neurological:      General: No focal deficit present.      Mental Status: She is alert.   Psychiatric:         Mood and Affect: Mood normal.         I have reviewed all medications, laboratory results, and imaging pertinent for today's encounter.    ==============================================================================  TODAY'S ASSESSMENT AND PLAN OF CARE:  Patient is a 64-year-old female who came with a large bowel obstruction who is now status post blowhole colostomy.     Plan:  -NPO  -NGT to LWIS   -IVF  -DVT prophylaxis  -Reglan every 8 hours  -IV PPI    Patient will be discussed with Attending Physician Dr. Lydia Sheppard PGY4  General Surgery Service   ==============================================================================

## 2024-07-23 LAB
ANION GAP SERPL CALC-SCNC: 13 MMOL/L (ref 10–20)
BASOPHILS # BLD AUTO: 0.14 X10*3/UL (ref 0–0.1)
BASOPHILS NFR BLD AUTO: 1.8 %
BUN SERPL-MCNC: 5 MG/DL (ref 6–23)
CALCIUM SERPL-MCNC: 7.7 MG/DL (ref 8.6–10.3)
CHLORIDE SERPL-SCNC: 99 MMOL/L (ref 98–107)
CO2 SERPL-SCNC: 27 MMOL/L (ref 21–32)
CREAT SERPL-MCNC: 0.42 MG/DL (ref 0.5–1.05)
EGFRCR SERPLBLD CKD-EPI 2021: >90 ML/MIN/1.73M*2
EOSINOPHIL # BLD AUTO: 0.3 X10*3/UL (ref 0–0.7)
EOSINOPHIL NFR BLD AUTO: 3.9 %
ERYTHROCYTE [DISTWIDTH] IN BLOOD BY AUTOMATED COUNT: 15 % (ref 11.5–14.5)
GLUCOSE SERPL-MCNC: 69 MG/DL (ref 74–99)
HCT VFR BLD AUTO: 33.3 % (ref 36–46)
HGB BLD-MCNC: 10.5 G/DL (ref 12–16)
IMM GRANULOCYTES # BLD AUTO: 0.02 X10*3/UL (ref 0–0.7)
IMM GRANULOCYTES NFR BLD AUTO: 0.3 % (ref 0–0.9)
LYMPHOCYTES # BLD AUTO: 2.1 X10*3/UL (ref 1.2–4.8)
LYMPHOCYTES NFR BLD AUTO: 27.2 %
MAGNESIUM SERPL-MCNC: 2.05 MG/DL (ref 1.6–2.4)
MCH RBC QN AUTO: 29.1 PG (ref 26–34)
MCHC RBC AUTO-ENTMCNC: 31.5 G/DL (ref 32–36)
MCV RBC AUTO: 92 FL (ref 80–100)
MONOCYTES # BLD AUTO: 0.51 X10*3/UL (ref 0.1–1)
MONOCYTES NFR BLD AUTO: 6.6 %
NEUTROPHILS # BLD AUTO: 4.65 X10*3/UL (ref 1.2–7.7)
NEUTROPHILS NFR BLD AUTO: 60.2 %
NRBC BLD-RTO: 0 /100 WBCS (ref 0–0)
PHOSPHATE SERPL-MCNC: 2.3 MG/DL (ref 2.5–4.9)
PLATELET # BLD AUTO: 603 X10*3/UL (ref 150–450)
POTASSIUM SERPL-SCNC: 4.1 MMOL/L (ref 3.5–5.3)
RBC # BLD AUTO: 3.61 X10*6/UL (ref 4–5.2)
SODIUM SERPL-SCNC: 135 MMOL/L (ref 136–145)
WBC # BLD AUTO: 7.7 X10*3/UL (ref 4.4–11.3)

## 2024-07-23 PROCEDURE — 2500000005 HC RX 250 GENERAL PHARMACY W/O HCPCS: Performed by: SURGERY

## 2024-07-23 PROCEDURE — 2500000004 HC RX 250 GENERAL PHARMACY W/ HCPCS (ALT 636 FOR OP/ED): Performed by: SURGERY

## 2024-07-23 PROCEDURE — 97530 THERAPEUTIC ACTIVITIES: CPT | Mod: GP,CQ | Performed by: PHYSICAL THERAPY ASSISTANT

## 2024-07-23 PROCEDURE — 36415 COLL VENOUS BLD VENIPUNCTURE: CPT | Performed by: INTERNAL MEDICINE

## 2024-07-23 PROCEDURE — 2500000001 HC RX 250 WO HCPCS SELF ADMINISTERED DRUGS (ALT 637 FOR MEDICARE OP): Performed by: SURGERY

## 2024-07-23 PROCEDURE — 99024 POSTOP FOLLOW-UP VISIT: CPT | Performed by: SURGERY

## 2024-07-23 PROCEDURE — 83735 ASSAY OF MAGNESIUM: CPT | Performed by: INTERNAL MEDICINE

## 2024-07-23 PROCEDURE — 2500000004 HC RX 250 GENERAL PHARMACY W/ HCPCS (ALT 636 FOR OP/ED): Performed by: STUDENT IN AN ORGANIZED HEALTH CARE EDUCATION/TRAINING PROGRAM

## 2024-07-23 PROCEDURE — 1100000001 HC PRIVATE ROOM DAILY

## 2024-07-23 PROCEDURE — 97116 GAIT TRAINING THERAPY: CPT | Mod: GP,CQ | Performed by: PHYSICAL THERAPY ASSISTANT

## 2024-07-23 PROCEDURE — 99232 SBSQ HOSP IP/OBS MODERATE 35: CPT | Performed by: INTERNAL MEDICINE

## 2024-07-23 PROCEDURE — 80048 BASIC METABOLIC PNL TOTAL CA: CPT | Performed by: INTERNAL MEDICINE

## 2024-07-23 PROCEDURE — 85025 COMPLETE CBC W/AUTO DIFF WBC: CPT | Performed by: INTERNAL MEDICINE

## 2024-07-23 PROCEDURE — 84100 ASSAY OF PHOSPHORUS: CPT | Performed by: SURGERY

## 2024-07-23 RX ORDER — POLYETHYLENE GLYCOL 3350 17 G/17G
17 POWDER, FOR SOLUTION ORAL DAILY
Status: DISCONTINUED | OUTPATIENT
Start: 2024-07-23 | End: 2024-07-24

## 2024-07-23 RX ORDER — OXYCODONE HYDROCHLORIDE 10 MG/1
10 TABLET ORAL EVERY 4 HOURS PRN
Status: DISCONTINUED | OUTPATIENT
Start: 2024-07-23 | End: 2024-07-25 | Stop reason: HOSPADM

## 2024-07-23 RX ORDER — KETOROLAC TROMETHAMINE 30 MG/ML
30 INJECTION, SOLUTION INTRAMUSCULAR; INTRAVENOUS ONCE
Status: COMPLETED | OUTPATIENT
Start: 2024-07-23 | End: 2024-07-23

## 2024-07-23 RX ORDER — DOCUSATE SODIUM 100 MG/1
100 CAPSULE, LIQUID FILLED ORAL 2 TIMES DAILY
Status: DISCONTINUED | OUTPATIENT
Start: 2024-07-23 | End: 2024-07-25 | Stop reason: HOSPADM

## 2024-07-23 RX ORDER — LEVOTHYROXINE SODIUM 88 UG/1
88 TABLET ORAL DAILY
Status: DISCONTINUED | OUTPATIENT
Start: 2024-07-24 | End: 2024-07-25 | Stop reason: HOSPADM

## 2024-07-23 RX ORDER — KETOROLAC TROMETHAMINE 30 MG/ML
15 INJECTION, SOLUTION INTRAMUSCULAR; INTRAVENOUS EVERY 8 HOURS
Status: DISCONTINUED | OUTPATIENT
Start: 2024-07-23 | End: 2024-07-23

## 2024-07-23 RX ORDER — GABAPENTIN 600 MG/1
1800 TABLET ORAL NIGHTLY
Status: DISCONTINUED | OUTPATIENT
Start: 2024-07-23 | End: 2024-07-25 | Stop reason: HOSPADM

## 2024-07-23 RX ORDER — GABAPENTIN 600 MG/1
1200 TABLET ORAL DAILY
Status: DISCONTINUED | OUTPATIENT
Start: 2024-07-23 | End: 2024-07-25 | Stop reason: HOSPADM

## 2024-07-23 RX ORDER — OXYCODONE HYDROCHLORIDE 5 MG/1
5 TABLET ORAL EVERY 4 HOURS PRN
Status: DISCONTINUED | OUTPATIENT
Start: 2024-07-23 | End: 2024-07-25 | Stop reason: HOSPADM

## 2024-07-23 RX ORDER — ACETAMINOPHEN 325 MG/1
975 TABLET ORAL EVERY 8 HOURS
Status: DISCONTINUED | OUTPATIENT
Start: 2024-07-23 | End: 2024-07-25 | Stop reason: HOSPADM

## 2024-07-23 RX ORDER — DULOXETIN HYDROCHLORIDE 30 MG/1
60 CAPSULE, DELAYED RELEASE ORAL DAILY
Status: DISCONTINUED | OUTPATIENT
Start: 2024-07-23 | End: 2024-07-25 | Stop reason: HOSPADM

## 2024-07-23 ASSESSMENT — PAIN DESCRIPTION - DESCRIPTORS
DESCRIPTORS: BURNING;CRAMPING
DESCRIPTORS: DISCOMFORT
DESCRIPTORS: DISCOMFORT
DESCRIPTORS: BURNING;CRUSHING;DISCOMFORT

## 2024-07-23 ASSESSMENT — COGNITIVE AND FUNCTIONAL STATUS - GENERAL
MOVING TO AND FROM BED TO CHAIR: A LITTLE
DAILY ACTIVITIY SCORE: 24
MOBILITY SCORE: 20
CLIMB 3 TO 5 STEPS WITH RAILING: A LITTLE
MOVING TO AND FROM BED TO CHAIR: A LITTLE
CLIMB 3 TO 5 STEPS WITH RAILING: A LITTLE
WALKING IN HOSPITAL ROOM: A LITTLE
STANDING UP FROM CHAIR USING ARMS: A LITTLE
DAILY ACTIVITIY SCORE: 24
CLIMB 3 TO 5 STEPS WITH RAILING: TOTAL
MOBILITY SCORE: 23
STANDING UP FROM CHAIR USING ARMS: A LITTLE
WALKING IN HOSPITAL ROOM: A LITTLE
MOBILITY SCORE: 18

## 2024-07-23 ASSESSMENT — PAIN SCALES - GENERAL
PAINLEVEL_OUTOF10: 2
PAINLEVEL_OUTOF10: 8
PAINLEVEL_OUTOF10: 0 - NO PAIN
PAINLEVEL_OUTOF10: 8
PAINLEVEL_OUTOF10: 8
PAINLEVEL_OUTOF10: 10 - WORST POSSIBLE PAIN
PAINLEVEL_OUTOF10: 9
PAINLEVEL_OUTOF10: 8
PAINLEVEL_OUTOF10: 4
PAINLEVEL_OUTOF10: 9
PAINLEVEL_OUTOF10: 10 - WORST POSSIBLE PAIN

## 2024-07-23 ASSESSMENT — PAIN - FUNCTIONAL ASSESSMENT
PAIN_FUNCTIONAL_ASSESSMENT: 0-10

## 2024-07-23 ASSESSMENT — PAIN DESCRIPTION - LOCATION
LOCATION: ABDOMEN

## 2024-07-23 NOTE — PROGRESS NOTES
"Barbara Sow is a 64 y.o. female on day 5 of admission presenting with Abdominal pain.    Subjective   NGT out.  Tolerating sips of clears this AM.  Denies issues with micturition.  [place] \"bubbling\" from ostomy.        Objective   Heart Rate:  []   Temp:  [35.8 °C (96.4 °F)-36.5 °C (97.7 °F)]   Resp:  [15-18]   BP: (115-144)/(70-80)   Weight:  [64.5 kg (142 lb 3.2 oz)]   SpO2:  [92 %-97 %]   I/O last 3 completed shifts:  In: 2460.4 (38.1 mL/kg) [I.V.:7.9 (0.1 mL/kg); NG/GT:480; IV Piggyback:1972.5]  Out: 5790 (89.8 mL/kg) [Urine:4490 (1.9 mL/kg/hr); Emesis/NG output:1200; Stool:100]  Weight: 64.5 kg   I/O this shift:  In: 100 [P.O.:100]  Out: 550 [Urine:550]  Physical Exam  Vitals reviewed.   Cardiovascular:      Rate and Rhythm: Normal rate.      Pulses: Normal pulses.   Pulmonary:      Effort: Pulmonary effort is normal.   Abdominal:      General: There is distension.      Palpations: Abdomen is soft.      Comments: Epigastric ostomy with liquidy stool, no gas   Skin:     General: Skin is warm.      Capillary Refill: Capillary refill takes less than 2 seconds.   Neurological:      General: No focal deficit present.      Mental Status: She is alert.   Psychiatric:         Mood and Affect: Mood normal.       Results for orders placed or performed during the hospital encounter of 07/18/24 (from the past 24 hour(s))   CBC and Auto Differential   Result Value Ref Range    WBC 7.7 4.4 - 11.3 x10*3/uL    nRBC 0.0 0.0 - 0.0 /100 WBCs    RBC 3.61 (L) 4.00 - 5.20 x10*6/uL    Hemoglobin 10.5 (L) 12.0 - 16.0 g/dL    Hematocrit 33.3 (L) 36.0 - 46.0 %    MCV 92 80 - 100 fL    MCH 29.1 26.0 - 34.0 pg    MCHC 31.5 (L) 32.0 - 36.0 g/dL    RDW 15.0 (H) 11.5 - 14.5 %    Platelets 603 (H) 150 - 450 x10*3/uL    Neutrophils % 60.2 40.0 - 80.0 %    Immature Granulocytes %, Automated 0.3 0.0 - 0.9 %    Lymphocytes % 27.2 13.0 - 44.0 %    Monocytes % 6.6 2.0 - 10.0 %    Eosinophils % 3.9 0.0 - 6.0 %    Basophils % 1.8 0.0 - " 2.0 %    Neutrophils Absolute 4.65 1.20 - 7.70 x10*3/uL    Immature Granulocytes Absolute, Automated 0.02 0.00 - 0.70 x10*3/uL    Lymphocytes Absolute 2.10 1.20 - 4.80 x10*3/uL    Monocytes Absolute 0.51 0.10 - 1.00 x10*3/uL    Eosinophils Absolute 0.30 0.00 - 0.70 x10*3/uL    Basophils Absolute 0.14 (H) 0.00 - 0.10 x10*3/uL   Basic Metabolic Panel   Result Value Ref Range    Glucose 69 (L) 74 - 99 mg/dL    Sodium 135 (L) 136 - 145 mmol/L    Potassium 4.1 3.5 - 5.3 mmol/L    Chloride 99 98 - 107 mmol/L    Bicarbonate 27 21 - 32 mmol/L    Anion Gap 13 10 - 20 mmol/L    Urea Nitrogen 5 (L) 6 - 23 mg/dL    Creatinine 0.42 (L) 0.50 - 1.05 mg/dL    eGFR >90 >60 mL/min/1.73m*2    Calcium 7.7 (L) 8.6 - 10.3 mg/dL   Magnesium   Result Value Ref Range    Magnesium 2.05 1.60 - 2.40 mg/dL   Phosphorus   Result Value Ref Range    Phosphorus 2.3 (L) 2.5 - 4.9 mg/dL         Assessment/Plan   Principal Problem:    Abdominal pain  Active Problems:    Bilateral sciatica    Hypothyroidism    Anxiety    Large bowel obstruction (Multi)  64F POD4 s/p blowhole colostomy for LBO doing expectedly but AROBF.  On review of her AM labs, still has hypophosphataemia, hypochloraemia, normal WBC  - try toradol  - restart home meds  - dc IV narcs and start PO PRN narcs  - ADAT, RBM  - replete phos and chloride; recheck lytes tomorrow incl LFTs (getting tylenol ATC)  - OOB/amb      Sharee Freeman MD

## 2024-07-23 NOTE — CONSULTS
"  Wound Care Consult     Visit Date: 7/23/2024      Patient Name: Barbara Sow         MRN: [id number]           YOB: 1960     Patient seen for new ostomy. Status post diverting loop ileostomy. Post op day 3. PMH: issues with constipation and had previously been admitted to an OSH with similar problem. Her history is also significant for having only weekly bowel movements, having had an open appendectomy, gyn procedures x2, hysterectomy for \"pain\" and lap sarina. Patient alert and oriented. Rating pain 2 out of 10 during exam and teaching.        Pertinent Labs:   Albumin   Date Value Ref Range Status   07/19/2024 3.9 3.4 - 5.0 g/dL Final   04/25/2024 4.6 3.4 - 5.0 g/dL Final       Wound Assessment:  Wound 07/19/24 Incision Abdomen Lower;Medial (Active)   Site Assessment Bleeding;Other (Comment) 07/20/24 0400   Pippa-Wound Assessment Other (Comment) 07/20/24 0400   Margins Poorly defined 07/20/24 0400   Drainage Description Serosanguineous 07/21/24 1936   Drainage Amount Small 07/21/24 1936   Dressing Other (Comment) 07/21/24 0812   Dressing Changed New 07/19/24 1736   Dressing Status Clean 07/19/24 1736     Assessment: Stoma pink, red, with swelling. 1 piece appliance in place. Stoma measuring about 8.8 cm. Peristomal skin is intact. Educated patient on supplies, stoma appearance, how to empty pouch, release gas and how to change pouch. 1 piece appliance emptied and changed. Patient verbalized understanding. Will continue to follow up with patient daily to offer support and education until discharge.     Patient will need home care if plan to discharge home.       Please contact me with questions or changes in patient condition.       Monet Lai RN  7/23/2024  9:46 AM          "
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
## 2024-07-23 NOTE — CARE PLAN
The patient's goals for the shift include      The clinical goals for the shift include monitor ostomy, strict I/O    Over the shift, the patient did not make progress toward the following goals. Barriers to progression include n/a. Recommendations to address these barriers include n/a.     Yes

## 2024-07-23 NOTE — CARE PLAN
Problem: Safety - Adult  Goal: Free from fall injury  Outcome: Progressing   The patient's goals for the shift include      The clinical goals for the shift include monitor ostomy strict  I/O

## 2024-07-23 NOTE — PROGRESS NOTES
Physical Therapy    Physical Therapy Treatment    Patient Name: Barbara Sow  MRN: [id number]  Today's Date: 7/23/2024  Time Calculation  Start Time: 1210  Stop Time: 1233  Time Calculation (min): 23 min    Assessment/Plan   PT Assessment  End of Session Communication: Bedside nurse  Assessment Comment: pt seems to have ready for dc/  PT Natalee agreeable to dc from PT services with staff able to assist pt as needed for mobility  End of Session Patient Position: Up in chair, Alarm on     PT Plan  Treatment/Interventions: Transfer training, Gait training, Stair training  PT Plan: Ongoing PT  PT Frequency: 3 times per week  PT Discharge Recommendations: No PT needed after discharge  PT Recommended Transfer Status: Stand by assist  PT - OK to Discharge: Yes      General Visit Information:   PT  Visit  PT Received On: 07/23/24  Response to Previous Treatment: Patient with no complaints from previous session.  General  Reason for Referral: abd pain/distension. ABD SURGERY 7/19 loop transverse colostomy creation  Referred By: ALECIA MCGREGOR. PT FOR RECENT SURG: ABD  Prior to Session Communication: Bedside nurse  Patient Position Received: Bed, 3 rail up, Alarm on  Preferred Learning Style: verbal  General Comment: pt agreeable to PT and cleared by RN. pt reporting she feels better today.    Subjective   Precautions:  Precautions  Medical Precautions: Abdominal precautions  Precautions Comment: new colostomy pouch, NG TUBE *can be clamped for amb    Pain:  Pain Assessment  Pain Assessment: 0-10  0-10 (Numeric) Pain Score: 0 - No pain  Cognition:  Cognition  Overall Cognitive Status: Within Functional Limits         Treatments:  Bed Mobility  Bed Mobility: Yes  Bed Mobility 1  Bed Mobility 1: Supine to sitting  Level of Assistance 1: Independent    Ambulation/Gait Training  Ambulation/Gait Training Performed: Yes  Ambulation/Gait Training 1  Surface 1: Level tile  Device 1: Rolling walker  Assistance 1: Distant  supervision  Comments/Distance (ft) 1: 400'x1 and 25'x1 with occasional cues for safety/  Transfers  Transfer: Yes  Transfer 1  Technique 1: Sit to stand, Stand to sit  Transfer Device 1: Walker  Transfer Level of Assistance 1: Modified independent  Transfers 2  Technique 2: Stand pivot  Transfer Device 2: Walker  Transfer Level of Assistance 2: Distant supervision  Trials/Comments 2: occasional cues for safety    Outcome Measures:  [facility] Basic Mobility  Turning from your back to your side while in a flat bed without using bedrails: None  Moving from lying on your back to sitting on the side of a flat bed without using bedrails: None  Moving to and from bed to chair (including a wheelchair): A little  Standing up from a chair using your arms (e.g. wheelchair or bedside chair): A little  To walk in hospital room: A little  Climbing 3-5 steps with railing: Total  Basic Mobility - Total Score: 18    Education Documentation  Body Mechanics, taught by Marina Golden PTA at 7/23/2024 12:57 PM.  Learner: Patient  Readiness: Acceptance  Method: Explanation  Response: Verbalizes Understanding, Demonstrated Understanding    Mobility Training, taught by Marina Golden PTA at 7/23/2024 12:57 PM.  Learner: Patient  Readiness: Acceptance  Method: Explanation  Response: Verbalizes Understanding, Demonstrated Understanding    Education Comments  No comments found.           Encounter Problems       Encounter Problems (Active)       Mobility       STG - Patient will ambulate community distance progress from walker to NO AD (Progressing)       Start:  07/21/24    Expected End:  07/28/24            STG - Patient will navigate 4-6 steps with rails/device to enter/exit APT  (Progressing)       Start:  07/21/24    Expected End:  07/28/24               Pain - Adult             Encounter Problems (Resolved)       PT Transfers       STG - Patient to transfer to and from sit to supine with demo LOG ROLL TECH post ABD surg (Met)       Start:   07/21/24    Expected End:  07/28/24    Resolved:  07/23/24

## 2024-07-24 LAB
ALBUMIN SERPL BCP-MCNC: 2.8 G/DL (ref 3.4–5)
ALP SERPL-CCNC: 68 U/L (ref 33–136)
ALT SERPL W P-5'-P-CCNC: 5 U/L (ref 7–45)
ANION GAP SERPL CALC-SCNC: 10 MMOL/L (ref 10–20)
AST SERPL W P-5'-P-CCNC: 14 U/L (ref 9–39)
BASOPHILS # BLD AUTO: 0.14 X10*3/UL (ref 0–0.1)
BASOPHILS NFR BLD AUTO: 2.1 %
BILIRUB DIRECT SERPL-MCNC: 0 MG/DL (ref 0–0.3)
BILIRUB SERPL-MCNC: 0.2 MG/DL (ref 0–1.2)
BUN SERPL-MCNC: 9 MG/DL (ref 6–23)
CALCIUM SERPL-MCNC: 8.6 MG/DL (ref 8.6–10.3)
CHLORIDE SERPL-SCNC: 105 MMOL/L (ref 98–107)
CO2 SERPL-SCNC: 27 MMOL/L (ref 21–32)
CREAT SERPL-MCNC: 0.53 MG/DL (ref 0.5–1.05)
EGFRCR SERPLBLD CKD-EPI 2021: >90 ML/MIN/1.73M*2
EOSINOPHIL # BLD AUTO: 0.33 X10*3/UL (ref 0–0.7)
EOSINOPHIL NFR BLD AUTO: 4.9 %
ERYTHROCYTE [DISTWIDTH] IN BLOOD BY AUTOMATED COUNT: 15 % (ref 11.5–14.5)
GLUCOSE SERPL-MCNC: 74 MG/DL (ref 74–99)
HCT VFR BLD AUTO: 35.5 % (ref 36–46)
HGB BLD-MCNC: 10.9 G/DL (ref 12–16)
IMM GRANULOCYTES # BLD AUTO: 0.04 X10*3/UL (ref 0–0.7)
IMM GRANULOCYTES NFR BLD AUTO: 0.6 % (ref 0–0.9)
LYMPHOCYTES # BLD AUTO: 2.2 X10*3/UL (ref 1.2–4.8)
LYMPHOCYTES NFR BLD AUTO: 32.4 %
MAGNESIUM SERPL-MCNC: 1.47 MG/DL (ref 1.6–2.4)
MCH RBC QN AUTO: 28.5 PG (ref 26–34)
MCHC RBC AUTO-ENTMCNC: 30.7 G/DL (ref 32–36)
MCV RBC AUTO: 93 FL (ref 80–100)
MONOCYTES # BLD AUTO: 0.43 X10*3/UL (ref 0.1–1)
MONOCYTES NFR BLD AUTO: 6.3 %
NEUTROPHILS # BLD AUTO: 3.65 X10*3/UL (ref 1.2–7.7)
NEUTROPHILS NFR BLD AUTO: 53.7 %
NRBC BLD-RTO: 0 /100 WBCS (ref 0–0)
PHOSPHATE SERPL-MCNC: 3.5 MG/DL (ref 2.5–4.9)
PLATELET # BLD AUTO: 521 X10*3/UL (ref 150–450)
POTASSIUM SERPL-SCNC: 4.1 MMOL/L (ref 3.5–5.3)
PROT SERPL-MCNC: 5.2 G/DL (ref 6.4–8.2)
RBC # BLD AUTO: 3.83 X10*6/UL (ref 4–5.2)
SODIUM SERPL-SCNC: 138 MMOL/L (ref 136–145)
WBC # BLD AUTO: 6.8 X10*3/UL (ref 4.4–11.3)

## 2024-07-24 PROCEDURE — 1100000001 HC PRIVATE ROOM DAILY

## 2024-07-24 PROCEDURE — 2500000004 HC RX 250 GENERAL PHARMACY W/ HCPCS (ALT 636 FOR OP/ED): Performed by: SURGERY

## 2024-07-24 PROCEDURE — 83735 ASSAY OF MAGNESIUM: CPT | Performed by: SURGERY

## 2024-07-24 PROCEDURE — 2500000002 HC RX 250 W HCPCS SELF ADMINISTERED DRUGS (ALT 637 FOR MEDICARE OP, ALT 636 FOR OP/ED): Performed by: SURGERY

## 2024-07-24 PROCEDURE — 2500000001 HC RX 250 WO HCPCS SELF ADMINISTERED DRUGS (ALT 637 FOR MEDICARE OP): Performed by: SURGERY

## 2024-07-24 PROCEDURE — 99024 POSTOP FOLLOW-UP VISIT: CPT | Performed by: SURGERY

## 2024-07-24 PROCEDURE — 84100 ASSAY OF PHOSPHORUS: CPT | Performed by: SURGERY

## 2024-07-24 PROCEDURE — 36415 COLL VENOUS BLD VENIPUNCTURE: CPT | Performed by: SURGERY

## 2024-07-24 PROCEDURE — 2500000005 HC RX 250 GENERAL PHARMACY W/O HCPCS: Performed by: SURGERY

## 2024-07-24 PROCEDURE — 85025 COMPLETE CBC W/AUTO DIFF WBC: CPT | Performed by: INTERNAL MEDICINE

## 2024-07-24 PROCEDURE — 82248 BILIRUBIN DIRECT: CPT | Performed by: SURGERY

## 2024-07-24 PROCEDURE — 99232 SBSQ HOSP IP/OBS MODERATE 35: CPT | Performed by: INTERNAL MEDICINE

## 2024-07-24 PROCEDURE — 80048 BASIC METABOLIC PNL TOTAL CA: CPT | Performed by: SURGERY

## 2024-07-24 RX ORDER — POLYETHYLENE GLYCOL 3350 17 G/17G
17 POWDER, FOR SOLUTION ORAL DAILY PRN
Status: DISCONTINUED | OUTPATIENT
Start: 2024-07-24 | End: 2024-07-25 | Stop reason: HOSPADM

## 2024-07-24 RX ORDER — KETOROLAC TROMETHAMINE 30 MG/ML
15 INJECTION, SOLUTION INTRAMUSCULAR; INTRAVENOUS EVERY 8 HOURS
Status: DISCONTINUED | OUTPATIENT
Start: 2024-07-24 | End: 2024-07-25 | Stop reason: HOSPADM

## 2024-07-24 RX ORDER — MAGNESIUM SULFATE HEPTAHYDRATE 40 MG/ML
2 INJECTION, SOLUTION INTRAVENOUS ONCE
Status: COMPLETED | OUTPATIENT
Start: 2024-07-24 | End: 2024-07-24

## 2024-07-24 RX ORDER — KETOROLAC TROMETHAMINE 30 MG/ML
15 INJECTION, SOLUTION INTRAMUSCULAR; INTRAVENOUS EVERY 8 HOURS
Status: DISCONTINUED | OUTPATIENT
Start: 2024-07-24 | End: 2024-07-24

## 2024-07-24 ASSESSMENT — PAIN - FUNCTIONAL ASSESSMENT
PAIN_FUNCTIONAL_ASSESSMENT: 0-10

## 2024-07-24 ASSESSMENT — PAIN DESCRIPTION - DESCRIPTORS
DESCRIPTORS: DISCOMFORT;STABBING
DESCRIPTORS: DISCOMFORT
DESCRIPTORS: ACHING;DISCOMFORT
DESCRIPTORS: DISCOMFORT

## 2024-07-24 ASSESSMENT — COGNITIVE AND FUNCTIONAL STATUS - GENERAL
MOVING TO AND FROM BED TO CHAIR: A LITTLE
CLIMB 3 TO 5 STEPS WITH RAILING: A LITTLE
MOVING TO AND FROM BED TO CHAIR: A LITTLE
DAILY ACTIVITIY SCORE: 24
STANDING UP FROM CHAIR USING ARMS: A LITTLE
MOBILITY SCORE: 20
WALKING IN HOSPITAL ROOM: A LITTLE
STANDING UP FROM CHAIR USING ARMS: A LITTLE
WALKING IN HOSPITAL ROOM: A LITTLE
MOBILITY SCORE: 20
DAILY ACTIVITIY SCORE: 24
CLIMB 3 TO 5 STEPS WITH RAILING: A LITTLE

## 2024-07-24 ASSESSMENT — PAIN SCALES - GENERAL
PAINLEVEL_OUTOF10: 9
PAINLEVEL_OUTOF10: 8
PAINLEVEL_OUTOF10: 5 - MODERATE PAIN
PAINLEVEL_OUTOF10: 5 - MODERATE PAIN
PAINLEVEL_OUTOF10: 9
PAINLEVEL_OUTOF10: 8

## 2024-07-24 ASSESSMENT — PAIN DESCRIPTION - LOCATION: LOCATION: ABDOMEN

## 2024-07-24 NOTE — CARE PLAN
Problem: Pain - Adult  Goal: Verbalizes/displays adequate comfort level or baseline comfort level  Outcome: Progressing     Problem: Safety - Adult  Goal: Free from fall injury  Outcome: Progressing     Problem: Discharge Planning  Goal: Discharge to home or other facility with appropriate resources  Outcome: Progressing     Problem: Chronic Conditions and Co-morbidities  Goal: Patient's chronic conditions and co-morbidity symptoms are monitored and maintained or improved  Outcome: Progressing     Problem: Skin  Goal: Decreased wound size/increased tissue granulation at next dressing change  Outcome: Progressing  Flowsheets (Taken 7/24/2024 1041)  Decreased wound size/increased tissue granulation at next dressing change:   Promote sleep for wound healing   Protective dressings over bony prominences  Goal: Participates in plan/prevention/treatment measures  Outcome: Progressing  Flowsheets (Taken 7/24/2024 1041)  Participates in plan/prevention/treatment measures:   Discuss with provider PT/OT consult   Elevate heels   Increase activity/out of bed for meals  Goal: Prevent/manage excess moisture  Outcome: Progressing  Flowsheets (Taken 7/24/2024 1041)  Prevent/manage excess moisture:   Cleanse incontinence/protect with barrier cream   Follow provider orders for dressing changes   Moisturize dry skin   Monitor for/manage infection if present  Goal: Prevent/minimize sheer/friction injuries  Outcome: Progressing  Flowsheets (Taken 7/24/2024 1041)  Prevent/minimize sheer/friction injuries:   Increase activity/out of bed for meals   Turn/reposition every 2 hours/use positioning/transfer devices   Use pull sheet  Goal: Promote/optimize nutrition  Outcome: Progressing  Flowsheets (Taken 7/24/2024 1041)  Promote/optimize nutrition:   Assist with feeding   Consume > 50% meals/supplements   Monitor/record intake including meals   Offer water/supplements/favorite foods  Goal: Promote skin healing  Outcome:  Progressing  Flowsheets (Taken 7/24/2024 1041)  Promote skin healing:   Assess skin/pad under line(s)/device(s)   Protective dressings over bony prominences   Turn/reposition every 2 hours/use positioning/transfer devices     Problem: Pain  Goal: Takes deep breaths with improved pain control throughout the shift  Outcome: Progressing  Goal: Turns in bed with improved pain control throughout the shift  Outcome: Progressing  Goal: Walks with improved pain control throughout the shift  Outcome: Progressing  Goal: Performs ADL's with improved pain control throughout shift  Outcome: Progressing  Goal: Participates in PT with improved pain control throughout the shift  Outcome: Progressing  Goal: Free from opioid side effects throughout the shift  Outcome: Progressing  Goal: Free from acute confusion related to pain meds throughout the shift  Outcome: Progressing     Problem: Nutrition  Goal: Nutrition support goals are met within 48 hrs  Outcome: Progressing  Goal: Nutrition support is meeting 75% of nutrient needs  Outcome: Progressing  Goal: BG  mg/dL  Outcome: Progressing  Goal: Lab values WNL  Outcome: Progressing  Goal: Electrolytes WNL  Outcome: Progressing  Goal: Maintain stable weight  Outcome: Progressing   The patient's goals for the shift include      The clinical goals for the shift include pt will remain free of falls or injury this shift

## 2024-07-24 NOTE — PROGRESS NOTES
I met with pt.  She is OOB and eating breakfast.  States she is feeling really good.  Ostomy output present.  Plan confirmed with pt that [facility] on discharge.
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
## 2024-07-24 NOTE — PROGRESS NOTES
[facility]  GENERAL SURGERY - PROGRESS NOTE    Patient Name: Barbara Sow  MRN: [id number]  Admit Date: 718  : 1960  AGE: 64 y.o.   GENDER: female    CHIEF COMPLAINT / EVENTS LAST 24HRS / HPI:  No acute events overnight patient is now status post blowhole colostomy.  Patient having large amounts of stool out of colostomy this morning belly feels better    MEDICAL HISTORY / ROS:   Admission history and ROS reviewed. Pertinent changes as follows:      Review of Systems   Constitutional:   Negative for fever, chills, weight loss.   HENT:  Negative for congestion and dental problem.    Eyes:  Negative for discharge and itching.   Respiratory: . Negative for apnea, choking and wheezing.    Cardiovascular:  Negative for palpitations and leg swelling.   Gastrointestinal: Positive for abdominal pain  Endocrine: Negative for cold intolerance and heat intolerance.   Musculoskeletal:  Negative for neck pain.   Skin:  Negative for color change.   Neurological:  Negative for tingling, loss of consciousness and numbness.   Psychiatric/Behavioral:  Negative for agitation and behavioral problems.      PHYSICAL EXAM:  Heart Rate:  [56-74]   Temp:  [36.1 °C (96.9 °F)-36.7 °C (98.1 °F)]   Resp:  [16-19]   BP: (119-143)/(65-84)   Weight:  [62.3 kg (137 lb 5.6 oz)]   SpO2:  [92 %-96 %]   Physical Exam  HENT:      Head: Normocephalic.      Mouth/Throat:      Mouth: Mucous membranes are moist.   Eyes:      Pupils: Pupils are equal, round, and reactive to light.   Cardiovascular:      Rate and Rhythm: Normal rate.   Abdominal:      General: Abdomen is flat.      Comments: Soft appropriately tender to palpation ostomy with stool mostly liquid in bag   Musculoskeletal:      Cervical back: Normal range of motion.   Skin:     General: Skin is warm.      Capillary Refill: Capillary refill takes less than 2 seconds.   Neurological:      General: No focal deficit present.      Mental Status: She is  alert.   Psychiatric:         Mood and Affect: Mood normal.         I have reviewed all medications, laboratory results, and imaging pertinent for today's encounter.    ==============================================================================  TODAY'S ASSESSMENT AND PLAN OF CARE:  Patient is a 64-year-old female who came with a large bowel obstruction who is now status post blowhole colostomy.     Plan:  -Fiber diet  -Possible discharge later today  -DVT prophylaxis  -P.o. meds  -Will need follow-up with GI for likely interval endoscopy    Patient will be discussed with Attending Physician Dr. Lydia Sheppard PGY4  General Surgery Service   ==============================================================================

## 2024-07-25 ENCOUNTER — HOME HEALTH ADMISSION (OUTPATIENT)
Dept: HOME HEALTH SERVICES | Facility: HOME HEALTH | Age: 64
End: 2024-07-25
Payer: MEDICARE

## 2024-07-25 ENCOUNTER — PHARMACY VISIT (OUTPATIENT)
Dept: PHARMACY | Facility: CLINIC | Age: 64
End: 2024-07-25
Payer: COMMERCIAL

## 2024-07-25 VITALS
RESPIRATION RATE: 16 BRPM | HEART RATE: 70 BPM | HEIGHT: 67 IN | DIASTOLIC BLOOD PRESSURE: 76 MMHG | SYSTOLIC BLOOD PRESSURE: 139 MMHG | WEIGHT: 137.35 LBS | BODY MASS INDEX: 21.56 KG/M2 | TEMPERATURE: 98.1 F | OXYGEN SATURATION: 98 %

## 2024-07-25 DIAGNOSIS — K56.50: ICD-10-CM

## 2024-07-25 PROBLEM — K56.609 LARGE BOWEL OBSTRUCTION (MULTI): Status: RESOLVED | Noted: 2024-07-18 | Resolved: 2024-07-25

## 2024-07-25 LAB
ANION GAP SERPL CALC-SCNC: 11 MMOL/L (ref 10–20)
BUN SERPL-MCNC: 22 MG/DL (ref 6–23)
CALCIUM SERPL-MCNC: 9.1 MG/DL (ref 8.6–10.3)
CEA SERPL-MCNC: 3 UG/L
CHLORIDE SERPL-SCNC: 98 MMOL/L (ref 98–107)
CO2 SERPL-SCNC: 28 MMOL/L (ref 21–32)
CREAT SERPL-MCNC: 0.71 MG/DL (ref 0.5–1.05)
EGFRCR SERPLBLD CKD-EPI 2021: >90 ML/MIN/1.73M*2
GLUCOSE SERPL-MCNC: 97 MG/DL (ref 74–99)
MAGNESIUM SERPL-MCNC: 1.89 MG/DL (ref 1.6–2.4)
PHOSPHATE SERPL-MCNC: 5.3 MG/DL (ref 2.5–4.9)
POTASSIUM SERPL-SCNC: 4.7 MMOL/L (ref 3.5–5.3)
SODIUM SERPL-SCNC: 132 MMOL/L (ref 136–145)

## 2024-07-25 PROCEDURE — 83735 ASSAY OF MAGNESIUM: CPT | Performed by: SURGERY

## 2024-07-25 PROCEDURE — 82378 CARCINOEMBRYONIC ANTIGEN: CPT | Mod: PORLAB | Performed by: SURGERY

## 2024-07-25 PROCEDURE — RXMED WILLOW AMBULATORY MEDICATION CHARGE

## 2024-07-25 PROCEDURE — 2500000002 HC RX 250 W HCPCS SELF ADMINISTERED DRUGS (ALT 637 FOR MEDICARE OP, ALT 636 FOR OP/ED): Performed by: SURGERY

## 2024-07-25 PROCEDURE — 2500000005 HC RX 250 GENERAL PHARMACY W/O HCPCS: Performed by: SURGERY

## 2024-07-25 PROCEDURE — 84100 ASSAY OF PHOSPHORUS: CPT | Performed by: SURGERY

## 2024-07-25 PROCEDURE — 2500000004 HC RX 250 GENERAL PHARMACY W/ HCPCS (ALT 636 FOR OP/ED): Performed by: STUDENT IN AN ORGANIZED HEALTH CARE EDUCATION/TRAINING PROGRAM

## 2024-07-25 PROCEDURE — 99231 SBSQ HOSP IP/OBS SF/LOW 25: CPT | Performed by: INTERNAL MEDICINE

## 2024-07-25 PROCEDURE — 99024 POSTOP FOLLOW-UP VISIT: CPT | Performed by: SURGERY

## 2024-07-25 PROCEDURE — 2500000004 HC RX 250 GENERAL PHARMACY W/ HCPCS (ALT 636 FOR OP/ED): Performed by: SURGERY

## 2024-07-25 PROCEDURE — 36415 COLL VENOUS BLD VENIPUNCTURE: CPT | Performed by: SURGERY

## 2024-07-25 PROCEDURE — 2500000001 HC RX 250 WO HCPCS SELF ADMINISTERED DRUGS (ALT 637 FOR MEDICARE OP): Performed by: SURGERY

## 2024-07-25 PROCEDURE — 80048 BASIC METABOLIC PNL TOTAL CA: CPT | Performed by: SURGERY

## 2024-07-25 RX ORDER — POLYETHYLENE GLYCOL-3350 AND ELECTROLYTES 236; 6.74; 5.86; 2.97; 22.74 G/274.31G; G/274.31G; G/274.31G; G/274.31G; G/274.31G
POWDER, FOR SOLUTION ORAL
Qty: 4000 ML | Refills: 0 | Status: SHIPPED | OUTPATIENT
Start: 2024-07-25

## 2024-07-25 RX ORDER — DOCUSATE SODIUM 100 MG/1
100 CAPSULE, LIQUID FILLED ORAL 2 TIMES DAILY
Qty: 10 CAPSULE | Refills: 0 | Status: SHIPPED | OUTPATIENT
Start: 2024-07-25 | End: 2024-07-30

## 2024-07-25 RX ORDER — OXYCODONE HYDROCHLORIDE 5 MG/1
5 TABLET ORAL EVERY 6 HOURS PRN
Qty: 12 TABLET | Refills: 0 | Status: SHIPPED | OUTPATIENT
Start: 2024-07-25 | End: 2024-07-29 | Stop reason: HOSPADM

## 2024-07-25 RX ORDER — MAGNESIUM SULFATE HEPTAHYDRATE 40 MG/ML
4 INJECTION, SOLUTION INTRAVENOUS ONCE
Status: COMPLETED | OUTPATIENT
Start: 2024-07-25 | End: 2024-07-25

## 2024-07-25 RX ORDER — ACETAMINOPHEN 325 MG/1
650 TABLET ORAL EVERY 6 HOURS PRN
Qty: 20 TABLET | Refills: 0 | Status: ON HOLD | OUTPATIENT
Start: 2024-07-25 | End: 2024-07-29

## 2024-07-25 ASSESSMENT — COGNITIVE AND FUNCTIONAL STATUS - GENERAL
MOVING TO AND FROM BED TO CHAIR: A LITTLE
CLIMB 3 TO 5 STEPS WITH RAILING: A LITTLE
DAILY ACTIVITIY SCORE: 24
WALKING IN HOSPITAL ROOM: A LITTLE
STANDING UP FROM CHAIR USING ARMS: A LITTLE
MOBILITY SCORE: 20

## 2024-07-25 ASSESSMENT — PAIN SCALES - GENERAL
PAINLEVEL_OUTOF10: 9
PAINLEVEL_OUTOF10: 5 - MODERATE PAIN
PAINLEVEL_OUTOF10: 8
PAINLEVEL_OUTOF10: 0 - NO PAIN
PAINLEVEL_OUTOF10: 9
PAINLEVEL_OUTOF10: 6

## 2024-07-25 ASSESSMENT — PAIN DESCRIPTION - LOCATION: LOCATION: ABDOMEN

## 2024-07-25 ASSESSMENT — PAIN - FUNCTIONAL ASSESSMENT
PAIN_FUNCTIONAL_ASSESSMENT: 0-10

## 2024-07-25 NOTE — PROGRESS NOTES
"Patient seen and ostomy assessed before discharge. During assessment stoma was bleeding, kaden red. Bedside RN and Dr. Freeman notified. Patient has ostomy folder with resources and education, regarding her new ostomy. Patient verbalizes that she feels comfortable going home and taking care of it. Patient received \"care package\" for discharge and verbalized understanding of supplies and their function.     Please contact me with questions or changes    Monet Lai RN  "
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
## 2024-07-25 NOTE — NURSING NOTE
This RN spoke with patient about no longer being a fall risk and being able to ambulate independently in the hallway.  This RN was able to answer unit questions and update patient on status.

## 2024-07-25 NOTE — PROGRESS NOTES
Social work consult placed for DC planing/CWI stating pt is interested in talking with someone about how her ostomy surgery will affect her personal life, met with pt and/or family to assess needs and provide support, introduced self and my role as  with care transition team. Confirmed pt is interested in therapy resources, pt informed SW she had participated in counseling in the past. SW provided pt with the [facility] Resource Guide and information on [employer]'s find a therapist search tool, pt was very appreciative of SW support and stated the resources provided are what she was looking for. Pt did not identify any other areas of SW support, No other needs identified at this time, SW signing off,  pt and care team aware of SW availability while inpt.     Fernandez Chowdary, MSW, LSW ([id number])   Care Transitions

## 2024-07-25 NOTE — CARE PLAN
Problem: Pain - Adult  Goal: Verbalizes/displays adequate comfort level or baseline comfort level  Outcome: Progressing     Problem: Safety - Adult  Goal: Free from fall injury  Outcome: Progressing     Problem: Discharge Planning  Goal: Discharge to home or other facility with appropriate resources  Outcome: Progressing     Problem: Chronic Conditions and Co-morbidities  Goal: Patient's chronic conditions and co-morbidity symptoms are monitored and maintained or improved  Outcome: Progressing     Problem: Skin  Goal: Decreased wound size/increased tissue granulation at next dressing change  Outcome: Progressing  Flowsheets (Taken 7/25/2024 0758)  Decreased wound size/increased tissue granulation at next dressing change:   Promote sleep for wound healing   Protective dressings over bony prominences  Goal: Participates in plan/prevention/treatment measures  Outcome: Progressing  Flowsheets (Taken 7/25/2024 0758)  Participates in plan/prevention/treatment measures:   Discuss with provider PT/OT consult   Elevate heels   Increase activity/out of bed for meals  Goal: Prevent/manage excess moisture  Outcome: Progressing  Flowsheets (Taken 7/25/2024 0758)  Prevent/manage excess moisture:   Cleanse incontinence/protect with barrier cream   Follow provider orders for dressing changes   Moisturize dry skin   Monitor for/manage infection if present  Goal: Prevent/minimize sheer/friction injuries  Outcome: Progressing  Flowsheets (Taken 7/25/2024 0758)  Prevent/minimize sheer/friction injuries:   Increase activity/out of bed for meals   Turn/reposition every 2 hours/use positioning/transfer devices   Use pull sheet  Goal: Promote/optimize nutrition  Outcome: Progressing  Flowsheets (Taken 7/25/2024 0758)  Promote/optimize nutrition:   Assist with feeding   Consume > 50% meals/supplements   Monitor/record intake including meals   Offer water/supplements/favorite foods  Goal: Promote skin healing  Outcome:  Progressing  Flowsheets (Taken 7/25/2024 0758)  Promote skin healing:   Assess skin/pad under line(s)/device(s)   Protective dressings over bony prominences   Turn/reposition every 2 hours/use positioning/transfer devices     Problem: Pain  Goal: Takes deep breaths with improved pain control throughout the shift  Outcome: Progressing  Goal: Turns in bed with improved pain control throughout the shift  Outcome: Progressing  Goal: Walks with improved pain control throughout the shift  Outcome: Progressing  Goal: Performs ADL's with improved pain control throughout shift  Outcome: Progressing  Goal: Participates in PT with improved pain control throughout the shift  Outcome: Progressing  Goal: Free from opioid side effects throughout the shift  Outcome: Progressing  Goal: Free from acute confusion related to pain meds throughout the shift  Outcome: Progressing     Problem: Nutrition  Goal: Nutrition support goals are met within 48 hrs  Outcome: Progressing  Goal: Nutrition support is meeting 75% of nutrient needs  Outcome: Progressing  Goal: BG  mg/dL  Outcome: Progressing  Goal: Lab values WNL  Outcome: Progressing  Goal: Electrolytes WNL  Outcome: Progressing  Goal: Maintain stable weight  Outcome: Progressing   The patient's goals for the shift include      The clinical goals for the shift include pt will remain free of falls or injury this shift

## 2024-07-25 NOTE — PROGRESS NOTES
[facility]  GENERAL SURGERY - PROGRESS NOTE    Patient Name: Barbara Sow  MRN: [id number]  Admit Date: 718  : 1960  AGE: 64 y.o.   GENDER: female    CHIEF COMPLAINT / EVENTS LAST 24HRS / HPI:  No acute events overnight patient is now status post blowhole colostomy.  Patient tolerating regular diet and having bowel function from ostomy.    MEDICAL HISTORY / ROS:   Admission history and ROS reviewed. Pertinent changes as follows:      Review of Systems   Constitutional:   Negative for fever, chills, weight loss.   HENT:  Negative for congestion and dental problem.    Eyes:  Negative for discharge and itching.   Respiratory: . Negative for apnea, choking and wheezing.    Cardiovascular:  Negative for palpitations and leg swelling.   Gastrointestinal: Positive for abdominal pain  Endocrine: Negative for cold intolerance and heat intolerance.   Musculoskeletal:  Negative for neck pain.   Skin:  Negative for color change.   Neurological:  Negative for tingling, loss of consciousness and numbness.   Psychiatric/Behavioral:  Negative for agitation and behavioral problems.      PHYSICAL EXAM:  Heart Rate:  [52-82]   Temp:  [36.7 °C (98 °F)-36.8 °C (98.3 °F)]   Resp:  [14-16]   BP: (110-136)/(69-74)   SpO2:  [92 %-96 %]   Physical Exam  HENT:      Head: Normocephalic.      Mouth/Throat:      Mouth: Mucous membranes are moist.   Eyes:      Pupils: Pupils are equal, round, and reactive to light.   Cardiovascular:      Rate and Rhythm: Normal rate.   Abdominal:      General: Abdomen is flat.      Comments: Soft appropriately tender to palpation ostomy with stool mostly liquid in bag   Musculoskeletal:      Cervical back: Normal range of motion.   Skin:     General: Skin is warm.      Capillary Refill: Capillary refill takes less than 2 seconds.   Neurological:      General: No focal deficit present.      Mental Status: She is alert.   Psychiatric:         Mood and Affect: Mood  normal.         I have reviewed all medications, laboratory results, and imaging pertinent for today's encounter.    ==============================================================================  TODAY'S ASSESSMENT AND PLAN OF CARE:  Patient is a 64-year-old female who came with a large bowel obstruction who is now status post blowhole colostomy.     Plan:  -Low fiber diet  -Home health consult for home ostomy nurse  -Possible discharge later today  -DVT prophylaxis  -P.o. meds  -Will need follow-up with GI for likely interval endoscopy    Patient will be discussed with Attending Physician Dr. Lydia Sheppard PGY4  General Surgery Service   ==============================================================================

## 2024-07-25 NOTE — DISCHARGE SUMMARY
Discharge Diagnosis  Abdominal pain    Issues Requiring Follow-Up  Large bowel obstruction status post transverse loop colostomy.  Will require endoscopic evaluation with gastroenterology    Test Results Pending At Discharge  Pending Labs       Order Current Status    Carcinoembryonic Antigen In process            Hospital Course   Patient is a 64-year-old female who presented with a large bowel obstruction.  Patient had scheduled colonoscopy months prior but decided to forego procedure patient was taken to the operating room on July 19 for transverse loop colostomy.  For further details please refer to operative report.  Patient tolerated procedure well.  Patient had NG tube placed postoperatively which eventually was removed.  Patient was started on liquid diet and transition to regular food slowly. On POD  6 , the patient was deemed fit for discharge. At the time of discharge, the patient was ambulating ad vicki, tolerating a regular diet, and pain was well-controlled on an oral regimen. The patient was educated on postoperative activity restrictions, wound care, and pain management. The patient will be discharged home with  in stable condition. She will follow up with Dr. Freeman in the outpatient setting in two weeks.     Pertinent Physical Exam At Time of Discharge  Physical Exam  HENT:      Head: Normocephalic.      Mouth/Throat:      Mouth: Mucous membranes are moist.   Eyes:      Pupils: Pupils are equal, round, and reactive to light.   Cardiovascular:      Rate and Rhythm: Normal rate.   Abdominal:      General: Abdomen is flat.      Comments: Soft appropriately tender to palpation ostomy with stool in bag. Healthy appearing mucosa.  Musculoskeletal:      Cervical back: Normal range of motion.   Skin:     General: Skin is warm.      Capillary Refill: Capillary refill takes less than 2 seconds.   Neurological:      General: No focal deficit present.      Mental Status: She is alert.   Psychiatric:          Mood and Affect: Mood normal.     Home Medications     Medication List      START taking these medications     acetaminophen 325 mg tablet; Commonly known as: Tylenol; Take 2 tablets   (650 mg) by mouth every 6 hours if needed for mild pain (1 - 3) for up to   20 doses.   docusate sodium 100 mg tablet; Commonly known as: Colace; Take 1 tablet   (100 mg) by mouth 2 times a day for 10 doses.   oxyCODONE 5 mg immediate release tablet; Commonly known as: Roxicodone;   Take 1 tablet (5 mg) by mouth every 6 hours if needed for severe pain (7 -   10) for up to 12 doses.     CONTINUE taking these medications     DULoxetine 60 mg DR capsule; Commonly known as: Cymbalta   gabapentin 600 mg tablet; Commonly known as: Neurontin   levothyroxine 88 mcg tablet; Commonly known as: Synthroid, Levoxyl       Outpatient Follow-Up  Future Appointments   Date Time Provider Department Center   10/28/2024  2:00 PM Maninder Christensen MD [id number] [facility]   4/29/2025  3:00 PM Maninder Christensen MD [id number] [facility]       Topher Sheppard MD

## 2024-07-25 NOTE — PROGRESS NOTES
Nutrition Follow Up Assessment:   Nutrition Assessment         Medical history per chart:    64 y.o. female with PMHx of Hypothyroidism anxiety, depression , prior ileus and multiple prior abdominal surgeries who presented with abdominal pain for about two days and increased distention. CT imaging revealed dilated stool-filled colon with transition point in the region of the splenic flexure, suggesting at least partial obstruction. Exploration laparotomy, transverse colostomy revealed large bowel obstruction 7/19.     7/25:  Pt standing up in room, getting ready to go home today, and reports tolerating diet well.  Provided patient with Nutrition therapy with Colostomy bag, and reviewed importance of adequate kcals/protein to promote healing.  Pt likes Ensure and will continue to consume at home.       7/22: RD consulted for TPN assessment and recommendation. Pt seen this morning, awake and alert at time of visit. Notes recent weight loss of about 10-15#, potentially more but unable to specify. She endorses nausea and abdominal pain associated with her current LBO. She reports that she was eating well PTA, with about 3 meals per day. Denies any food allergies, chewing or swallowing difficulty.   Per Surgery PPN to start tonight, and need for TPN will be reassessed tomorrow.  Will follow for need of TPN.     Nutrition History:          Current Diet: Adult diet Regular  Supplement(s): Yes: Ensure plus high protein TID  Average meal Intake during admission: <75%   Average supplement intake during admission: <75%     Nutrition Related Findings:    Teeth: Missing teeth     BM: Last BM Date: 07/16/24  Food allergies: NKFA. is allergic to cefaclor, cefuroxime axetil, and ketorolac.  Meds/Labs reviewed.  acetaminophen, 975 mg, oral, q8h  docusate sodium, 100 mg, oral, BID  DULoxetine, 60 mg, oral, Daily  enoxaparin, 40 mg, subcutaneous, q24h  gabapentin, 1,200 mg, oral, Daily  gabapentin, 1,800 mg, oral, Nightly  ketorolac,  "15 mg, intravenous, q8h  levothyroxine, 88 mcg, oral, Daily  lidocaine, 2 patch, transdermal, Daily  nicotine, 1 patch, transdermal, Daily   Followed by  [START ON 8/29/2024] nicotine, 1 patch, transdermal, Daily   Followed by  [START ON 9/12/2024] nicotine, 1 patch, transdermal, Daily             Nutrition Significant Labs:    Results from last 7 days   Lab Units 07/25/24  0333 07/24/24  0541 07/23/24  0548   GLUCOSE mg/dL 97 74 69*   SODIUM mmol/L 132* 138 135*   POTASSIUM mmol/L 4.7 4.1 4.1   CHLORIDE mmol/L 98 105 99   CO2 mmol/L 28 27 27   BUN mg/dL 22 9 5*   CREATININE mg/dL 0.71 0.53 0.42*   EGFR mL/min/1.73m*2 >90 >90 >90   CALCIUM mg/dL 9.1 8.6 7.7*   PHOSPHORUS mg/dL 5.3* 3.5 2.3*   MAGNESIUM mg/dL 1.89 1.47* 2.05     Lab Results   Component Value Date    HGBA1C 5.6 04/25/2024           Anthropometrics:  Height: 170.2 cm (5' 7\")   Weight: 62.3 kg (137 lb 5.6 oz)   BMI (Calculated): 21.51  IBW/kg (Dietitian Calculated): 61.4 kg  Percent of IBW: 106 %       Weight History:   Wt Readings from Last 10 Encounters:   07/24/24 62.3 kg (137 lb 5.6 oz)   04/25/24 56.2 kg (124 lb)   08/23/22 66.7 kg (147 lb)        Weight Change %:  Weight History / % Weight Change: Limited weight hx available to assess wt trends. Pt does endorse loss of 10-15# with UBW of around 130-135#, unable to confirm accuracy in weight records. Noted sig gain of 15.3% since April of 2024 (4/25/24, 124#; 7/21/24, 143#). ? accuracy, gain may be r/t current LBO and sig constipation. Will monitor wt trends during admission.  Significant Weight Loss: No          Nutrition Focused Physical Exam Findings:    Subcutaneous Fat Loss:   Orbital Fat Pads: Mild-Moderate (slight dark circles and slight hollowing)  Buccal Fat Pads: Mild-Moderate (flat cheeks, minimal bounce)  Triceps: Mild-Moderate (less than ample fat tissue)  Muscle Wasting:  Temporalis: Mild-Moderate (slight depression)  Pectoralis (Clavicular Region): Severe (protruding prominent " clavicle)  Interosseous: Mild-Moderate (slightly depressed area between thumb and forefinger)  Quadriceps: Severe (depressions on inner and outer thigh)  Gastrocnemius: Severe (minimal muscle definition)  Edema:  Edema: none  Physical Findings:  Skin: Positive (Incision wound to abdomen. Multiple scratches/scabs to lower legs)    Estimated Needs:   Total Energy Estimated Needs (kCal):  (2213-3952)  Method for Estimating Needs: 25-30kcal/kg, current weight  Total Protein Estimated Needs (g):  (65-78)  Method for Estimating Needs: 1-1.2g/kg, current weight  Total Fluid Estimated Needs (mL):  (5176-4178)  Method for Estimating Needs: 1mL/kcal        Nutrition Diagnosis   Nutrition Diagnosis:  Malnutrition Diagnosis  Patient has Malnutrition Diagnosis:  (Unable to accurately assess)    Nutrition Diagnosis  Patient has Nutrition Diagnosis: Yes  Diagnosis Status (1): Resolved  Nutrition Diagnosis 1: Inadequate oral intake  Related to (1): NPO status with need for TPN  As Evidenced by (1): inability to consume nutrition orally d/t reduced bowel function  Additional Nutrition Diagnosis: Diagnosis 2  Diagnosis Status (2): New  Nutrition Diagnosis 2: Increased nutrient needs  Related to (2): wound healing  As Evidenced by (2): ABD incision s/p colostomy       Nutrition Interventions/Recommendations   Nutrition Interventions and Recommendations:        Nutrition Prescription:  Individualized Nutrition Prescription Provided for : Supplements        Nutrition Interventions:   Food and/or Nutrient Delivery Interventions  Interventions: Medical food supplement  Medical Food Supplement: Commercial beverage  Goal: Ensure plus high protein TID         Nutrition Education:   Education Documentation  No documentation found.      Nutrition Counseling  Counseling Theoretical Approach: Other (Comment)  Goal: Provided Nutrition therapy with Colostomy, reviewed supplements, and low fiber diet       Nutrition Monitoring and Evaluation    Monitoring/Evaluation:   Food/Nutrient Related History Monitoring  Monitoring and Evaluation Plan: Energy intake  Energy Intake: Estimated energy intake  Criteria: PO and supplement intake >75%    Body Composition/Growth/Weight History  Monitoring and Evaluation Plan: Weight  Weight: Measured weight  Criteria: Stable weight    Biochemical Data, Medical Tests and Procedures  Monitoring and Evaluation Plan: Electrolyte/renal panel, Glucose/endocrine profile  Electrolyte and Renal Panel: BUN, Sodium, Creatinine, Magnesium, Phosphorus, Potassium  Criteria: WNL  Glucose/Endocrine Profile: Glucose, casual  Criteria: WNL    Nutrition Focused Physical Findings  Monitoring and Evaluation Plan: Skin  Skin: Impaired wound healing  Criteria: Promote healing            Time Spent/Follow-up Reminder:   Follow Up  Time Spent (min): 35 minutes  Follow up: Provided inpatient RDN contact information  Last Date of Nutrition Visit: 07/25/24  Nutrition Follow-Up Needed?: Dietitian to reassess per policy  Follow up Comment: 7/30-7/31

## 2024-07-26 ENCOUNTER — PATIENT OUTREACH (OUTPATIENT)
Dept: CARE COORDINATION | Facility: CLINIC | Age: 64
End: 2024-07-26
Payer: MEDICARE

## 2024-07-26 ENCOUNTER — DOCUMENTATION (OUTPATIENT)
Dept: HOME HEALTH SERVICES | Facility: HOME HEALTH | Age: 64
End: 2024-07-26
Payer: MEDICARE

## 2024-07-26 NOTE — PROGRESS NOTES
Discharge Facility:[facility]  Discharge Diagnosis:ABD Pain  Admission Date:07/18/24  Discharge Date07/25/24     PCP Appointment Date:08/01/24  Specialist Appointment Date:   Hospital Encounter and Summary Linked: Yes  See discharge assessment below for further details    Engagement  Call Start Time: 0118 (7/26/2024  1:18 PM)    Medications  Medications reviewed with patient/caregiver?: Yes (oxtcodone 5mg tylenol colace 100mg) (7/26/2024  1:18 PM)  Is the patient having any side effects they believe may be caused by any medication additions or changes?: No (7/26/2024  1:18 PM)  Does the patient have all medications ordered at discharge?: Yes (7/26/2024  1:18 PM)  Is the patient taking all medications as directed (includes completed medication regime)?: Yes (7/26/2024  1:18 PM)    Appointments  Does the patient have a primary care provider?: Yes (7/26/2024  1:18 PM)  Care Management Interventions: Verified appointment date/time/provider (7/26/2024  1:18 PM)    Self Management  Has home health visited the patient within 72 hours of discharge?: Not applicable (7/26/2024  1:18 PM)  Has all Durable Medical Equipment (DME) been delivered?: No (7/26/2024  1:18 PM)    Patient Teaching  Does the patient have access to their discharge instructions?: Yes (7/26/2024  1:18 PM)  Care Management Interventions: Reviewed instructions with patient (7/26/2024  1:18 PM)  What is the patient's perception of their health status since discharge?: Improving (still feling a little bloated) (7/26/2024  1:18 PM)    Wrap Up  Call End Time: 0125 (7/26/2024  1:18 PM)

## 2024-07-26 NOTE — HH CARE COORDINATION
Home Care received a Referral for Nursing. We have processed the referral for a Start of Care on 7.27.2024.     If you have any questions or concerns, please feel free to contact us at [phone]. Follow the prompts, enter your five digit zip code, and you will be directed to your care team on [facility].

## 2024-07-27 ENCOUNTER — HOME CARE VISIT (OUTPATIENT)
Dept: HOME HEALTH SERVICES | Facility: HOME HEALTH | Age: 64
End: 2024-07-27

## 2024-07-27 ENCOUNTER — HOSPITAL ENCOUNTER (INPATIENT)
Facility: HOSPITAL | Age: 64
End: 2024-07-27
Attending: STUDENT IN AN ORGANIZED HEALTH CARE EDUCATION/TRAINING PROGRAM | Admitting: SURGERY
Payer: MEDICARE

## 2024-07-27 ENCOUNTER — APPOINTMENT (OUTPATIENT)
Dept: RADIOLOGY | Facility: HOSPITAL | Age: 64
End: 2024-07-27
Payer: MEDICARE

## 2024-07-27 DIAGNOSIS — K56.609 LARGE BOWEL OBSTRUCTION (MULTI): ICD-10-CM

## 2024-07-27 DIAGNOSIS — K94.09: Primary | ICD-10-CM

## 2024-07-27 DIAGNOSIS — R10.84 GENERALIZED ABDOMINAL PAIN: ICD-10-CM

## 2024-07-27 DIAGNOSIS — K59.00 CONSTIPATION, UNSPECIFIED CONSTIPATION TYPE: ICD-10-CM

## 2024-07-27 PROBLEM — K94.03: Status: ACTIVE | Noted: 2024-07-27

## 2024-07-27 PROBLEM — K92.2 LOWER GI BLEED: Status: ACTIVE | Noted: 2024-07-27

## 2024-07-27 LAB
ALBUMIN SERPL BCP-MCNC: 3.4 G/DL (ref 3.4–5)
ALP SERPL-CCNC: 86 U/L (ref 33–136)
ALT SERPL W P-5'-P-CCNC: 9 U/L (ref 7–45)
ANION GAP SERPL CALC-SCNC: 12 MMOL/L (ref 10–20)
AST SERPL W P-5'-P-CCNC: 22 U/L (ref 9–39)
BASOPHILS # BLD AUTO: 0.22 X10*3/UL (ref 0–0.1)
BASOPHILS NFR BLD AUTO: 2 %
BILIRUB SERPL-MCNC: 0.2 MG/DL (ref 0–1.2)
BUN SERPL-MCNC: 20 MG/DL (ref 6–23)
CALCIUM SERPL-MCNC: 9.1 MG/DL (ref 8.6–10.3)
CHLORIDE SERPL-SCNC: 97 MMOL/L (ref 98–107)
CO2 SERPL-SCNC: 30 MMOL/L (ref 21–32)
CREAT SERPL-MCNC: 0.85 MG/DL (ref 0.5–1.05)
EGFRCR SERPLBLD CKD-EPI 2021: 76 ML/MIN/1.73M*2
EOSINOPHIL # BLD AUTO: 0.53 X10*3/UL (ref 0–0.7)
EOSINOPHIL NFR BLD AUTO: 4.8 %
ERYTHROCYTE [DISTWIDTH] IN BLOOD BY AUTOMATED COUNT: 15.7 % (ref 11.5–14.5)
GLUCOSE SERPL-MCNC: 84 MG/DL (ref 74–99)
HCT VFR BLD AUTO: 29.6 % (ref 36–46)
HGB BLD-MCNC: 9.5 G/DL (ref 12–16)
IMM GRANULOCYTES # BLD AUTO: 0.18 X10*3/UL (ref 0–0.7)
IMM GRANULOCYTES NFR BLD AUTO: 1.6 % (ref 0–0.9)
LACTATE SERPL-SCNC: 0.9 MMOL/L (ref 0.4–2)
LIPASE SERPL-CCNC: 43 U/L (ref 9–82)
LYMPHOCYTES # BLD AUTO: 3.06 X10*3/UL (ref 1.2–4.8)
LYMPHOCYTES NFR BLD AUTO: 27.8 %
MCH RBC QN AUTO: 29.6 PG (ref 26–34)
MCHC RBC AUTO-ENTMCNC: 32.1 G/DL (ref 32–36)
MCV RBC AUTO: 92 FL (ref 80–100)
MONOCYTES # BLD AUTO: 0.98 X10*3/UL (ref 0.1–1)
MONOCYTES NFR BLD AUTO: 8.9 %
NEUTROPHILS # BLD AUTO: 6.04 X10*3/UL (ref 1.2–7.7)
NEUTROPHILS NFR BLD AUTO: 54.9 %
NRBC BLD-RTO: 0 /100 WBCS (ref 0–0)
PLATELET # BLD AUTO: 613 X10*3/UL (ref 150–450)
POTASSIUM SERPL-SCNC: 4.5 MMOL/L (ref 3.5–5.3)
PROT SERPL-MCNC: 6.5 G/DL (ref 6.4–8.2)
RBC # BLD AUTO: 3.21 X10*6/UL (ref 4–5.2)
SODIUM SERPL-SCNC: 134 MMOL/L (ref 136–145)
WBC # BLD AUTO: 11 X10*3/UL (ref 4.4–11.3)

## 2024-07-27 PROCEDURE — 2500000004 HC RX 250 GENERAL PHARMACY W/ HCPCS (ALT 636 FOR OP/ED): Performed by: STUDENT IN AN ORGANIZED HEALTH CARE EDUCATION/TRAINING PROGRAM

## 2024-07-27 PROCEDURE — 99024 POSTOP FOLLOW-UP VISIT: CPT | Performed by: SURGERY

## 2024-07-27 PROCEDURE — 96361 HYDRATE IV INFUSION ADD-ON: CPT

## 2024-07-27 PROCEDURE — 74177 CT ABD & PELVIS W/CONTRAST: CPT | Performed by: SURGERY

## 2024-07-27 PROCEDURE — 1210000001 HC SEMI-PRIVATE ROOM DAILY

## 2024-07-27 PROCEDURE — 74177 CT ABD & PELVIS W/CONTRAST: CPT

## 2024-07-27 PROCEDURE — 99285 EMERGENCY DEPT VISIT HI MDM: CPT | Mod: 25

## 2024-07-27 PROCEDURE — 2500000001 HC RX 250 WO HCPCS SELF ADMINISTERED DRUGS (ALT 637 FOR MEDICARE OP): Performed by: SURGERY

## 2024-07-27 PROCEDURE — 36415 COLL VENOUS BLD VENIPUNCTURE: CPT | Performed by: STUDENT IN AN ORGANIZED HEALTH CARE EDUCATION/TRAINING PROGRAM

## 2024-07-27 PROCEDURE — 96374 THER/PROPH/DIAG INJ IV PUSH: CPT

## 2024-07-27 PROCEDURE — 80053 COMPREHEN METABOLIC PANEL: CPT | Performed by: STUDENT IN AN ORGANIZED HEALTH CARE EDUCATION/TRAINING PROGRAM

## 2024-07-27 PROCEDURE — 2500000002 HC RX 250 W HCPCS SELF ADMINISTERED DRUGS (ALT 637 FOR MEDICARE OP, ALT 636 FOR OP/ED): Performed by: SURGERY

## 2024-07-27 PROCEDURE — 2550000001 HC RX 255 CONTRASTS: Performed by: STUDENT IN AN ORGANIZED HEALTH CARE EDUCATION/TRAINING PROGRAM

## 2024-07-27 PROCEDURE — 83605 ASSAY OF LACTIC ACID: CPT | Performed by: STUDENT IN AN ORGANIZED HEALTH CARE EDUCATION/TRAINING PROGRAM

## 2024-07-27 PROCEDURE — 83690 ASSAY OF LIPASE: CPT | Performed by: STUDENT IN AN ORGANIZED HEALTH CARE EDUCATION/TRAINING PROGRAM

## 2024-07-27 PROCEDURE — 96376 TX/PRO/DX INJ SAME DRUG ADON: CPT

## 2024-07-27 PROCEDURE — 96375 TX/PRO/DX INJ NEW DRUG ADDON: CPT

## 2024-07-27 PROCEDURE — 2500000004 HC RX 250 GENERAL PHARMACY W/ HCPCS (ALT 636 FOR OP/ED): Performed by: SURGERY

## 2024-07-27 PROCEDURE — 85025 COMPLETE CBC W/AUTO DIFF WBC: CPT | Performed by: STUDENT IN AN ORGANIZED HEALTH CARE EDUCATION/TRAINING PROGRAM

## 2024-07-27 RX ORDER — MORPHINE SULFATE 4 MG/ML
4 INJECTION INTRAVENOUS ONCE
Status: COMPLETED | OUTPATIENT
Start: 2024-07-27 | End: 2024-07-27

## 2024-07-27 RX ORDER — PANTOPRAZOLE SODIUM 40 MG/10ML
40 INJECTION, POWDER, LYOPHILIZED, FOR SOLUTION INTRAVENOUS
Status: DISCONTINUED | OUTPATIENT
Start: 2024-07-27 | End: 2024-07-29

## 2024-07-27 RX ORDER — ONDANSETRON HYDROCHLORIDE 2 MG/ML
4 INJECTION, SOLUTION INTRAVENOUS ONCE
Status: COMPLETED | OUTPATIENT
Start: 2024-07-27 | End: 2024-07-27

## 2024-07-27 RX ORDER — OXYCODONE HYDROCHLORIDE 5 MG/1
5 TABLET ORAL EVERY 6 HOURS PRN
Status: DISCONTINUED | OUTPATIENT
Start: 2024-07-27 | End: 2024-07-29 | Stop reason: HOSPADM

## 2024-07-27 RX ORDER — ACETAMINOPHEN 325 MG/1
650 TABLET ORAL EVERY 4 HOURS PRN
Status: DISCONTINUED | OUTPATIENT
Start: 2024-07-27 | End: 2024-07-29

## 2024-07-27 RX ORDER — LEVOTHYROXINE SODIUM 88 UG/1
88 TABLET ORAL DAILY
Status: DISCONTINUED | OUTPATIENT
Start: 2024-07-27 | End: 2024-07-29 | Stop reason: HOSPADM

## 2024-07-27 RX ORDER — NALOXONE HYDROCHLORIDE 1 MG/ML
0.2 INJECTION INTRAMUSCULAR; INTRAVENOUS; SUBCUTANEOUS EVERY 5 MIN PRN
Status: DISCONTINUED | OUTPATIENT
Start: 2024-07-27 | End: 2024-07-29 | Stop reason: HOSPADM

## 2024-07-27 RX ORDER — DULOXETIN HYDROCHLORIDE 30 MG/1
60 CAPSULE, DELAYED RELEASE ORAL DAILY
Status: DISCONTINUED | OUTPATIENT
Start: 2024-07-27 | End: 2024-07-29 | Stop reason: HOSPADM

## 2024-07-27 RX ORDER — MORPHINE SULFATE 4 MG/ML
4 INJECTION INTRAVENOUS EVERY 4 HOURS PRN
Status: DISCONTINUED | OUTPATIENT
Start: 2024-07-27 | End: 2024-07-29

## 2024-07-27 RX ORDER — MORPHINE SULFATE 2 MG/ML
2 INJECTION, SOLUTION INTRAMUSCULAR; INTRAVENOUS EVERY 4 HOURS PRN
Status: DISCONTINUED | OUTPATIENT
Start: 2024-07-27 | End: 2024-07-27

## 2024-07-27 RX ORDER — DEXTROSE MONOHYDRATE, SODIUM CHLORIDE, AND POTASSIUM CHLORIDE 50; 1.49; 9 G/1000ML; G/1000ML; G/1000ML
100 INJECTION, SOLUTION INTRAVENOUS CONTINUOUS
Status: DISCONTINUED | OUTPATIENT
Start: 2024-07-27 | End: 2024-07-28

## 2024-07-27 RX ORDER — POLYETHYLENE GLYCOL 3350, SODIUM CHLORIDE, SODIUM BICARBONATE, POTASSIUM CHLORIDE 420; 11.2; 5.72; 1.48 G/4L; G/4L; G/4L; G/4L
1000 POWDER, FOR SOLUTION ORAL ONCE
Status: COMPLETED | OUTPATIENT
Start: 2024-07-27 | End: 2024-07-27

## 2024-07-27 RX ORDER — PANTOPRAZOLE SODIUM 40 MG/1
40 TABLET, DELAYED RELEASE ORAL
Status: DISCONTINUED | OUTPATIENT
Start: 2024-07-27 | End: 2024-07-29 | Stop reason: HOSPADM

## 2024-07-27 SDOH — SOCIAL STABILITY: SOCIAL INSECURITY: DO YOU FEEL ANYONE HAS EXPLOITED OR TAKEN ADVANTAGE OF YOU FINANCIALLY OR OF YOUR PERSONAL PROPERTY?: NO

## 2024-07-27 SDOH — SOCIAL STABILITY: SOCIAL INSECURITY: HAVE YOU HAD ANY THOUGHTS OF HARMING ANYONE ELSE?: NO

## 2024-07-27 SDOH — SOCIAL STABILITY: SOCIAL INSECURITY: HAS ANYONE EVER THREATENED TO HURT YOUR FAMILY OR YOUR PETS?: NO

## 2024-07-27 SDOH — SOCIAL STABILITY: SOCIAL INSECURITY: WERE YOU ABLE TO COMPLETE ALL THE BEHAVIORAL HEALTH SCREENINGS?: YES

## 2024-07-27 SDOH — SOCIAL STABILITY: SOCIAL INSECURITY: DOES ANYONE TRY TO KEEP YOU FROM HAVING/CONTACTING OTHER FRIENDS OR DOING THINGS OUTSIDE YOUR HOME?: NO

## 2024-07-27 SDOH — SOCIAL STABILITY: SOCIAL INSECURITY: ABUSE: ADULT

## 2024-07-27 SDOH — SOCIAL STABILITY: SOCIAL INSECURITY: ARE YOU OR HAVE YOU BEEN THREATENED OR ABUSED PHYSICALLY, EMOTIONALLY, OR SEXUALLY BY ANYONE?: NO

## 2024-07-27 SDOH — SOCIAL STABILITY: SOCIAL INSECURITY: ARE THERE ANY APPARENT SIGNS OF INJURIES/BEHAVIORS THAT COULD BE RELATED TO ABUSE/NEGLECT?: NO

## 2024-07-27 SDOH — SOCIAL STABILITY: SOCIAL INSECURITY: HAVE YOU HAD THOUGHTS OF HARMING ANYONE ELSE?: NO

## 2024-07-27 SDOH — SOCIAL STABILITY: SOCIAL INSECURITY: DO YOU FEEL UNSAFE GOING BACK TO THE PLACE WHERE YOU ARE LIVING?: NO

## 2024-07-27 ASSESSMENT — ENCOUNTER SYMPTOMS
EYE PAIN: 0
VOMITING: 0
FREQUENCY: 0
MYALGIAS: 1
AGITATION: 0
CONFUSION: 0
WEAKNESS: 0
BRUISES/BLEEDS EASILY: 0
DIARRHEA: 0
COUGH: 0
CONSTIPATION: 1
FEVER: 0
EYE REDNESS: 0
HEMATURIA: 0
WOUND: 0
ARTHRALGIAS: 1
NERVOUS/ANXIOUS: 1
HEADACHES: 0
ABDOMINAL PAIN: 0
CHILLS: 0
DYSURIA: 0
FATIGUE: 1
NAUSEA: 0
POLYPHAGIA: 0
SHORTNESS OF BREATH: 0
FLANK PAIN: 0
SPEECH DIFFICULTY: 0

## 2024-07-27 ASSESSMENT — COGNITIVE AND FUNCTIONAL STATUS - GENERAL
MOBILITY SCORE: 22
WALKING IN HOSPITAL ROOM: A LITTLE
DAILY ACTIVITIY SCORE: 22
CLIMB 3 TO 5 STEPS WITH RAILING: A LITTLE
DRESSING REGULAR LOWER BODY CLOTHING: A LITTLE
DAILY ACTIVITIY SCORE: 22
HELP NEEDED FOR BATHING: A LITTLE
DRESSING REGULAR LOWER BODY CLOTHING: A LITTLE
DRESSING REGULAR LOWER BODY CLOTHING: A LITTLE
WALKING IN HOSPITAL ROOM: A LITTLE
PATIENT BASELINE BEDBOUND: NO
CLIMB 3 TO 5 STEPS WITH RAILING: A LITTLE
WALKING IN HOSPITAL ROOM: A LITTLE
HELP NEEDED FOR BATHING: A LITTLE
MOBILITY SCORE: 22
HELP NEEDED FOR BATHING: A LITTLE
CLIMB 3 TO 5 STEPS WITH RAILING: A LITTLE

## 2024-07-27 ASSESSMENT — LIFESTYLE VARIABLES
HOW OFTEN DO YOU HAVE A DRINK CONTAINING ALCOHOL: MONTHLY OR LESS
HOW OFTEN DO YOU HAVE 6 OR MORE DRINKS ON ONE OCCASION: NEVER
SKIP TO QUESTIONS 9-10: 1
HOW MANY STANDARD DRINKS CONTAINING ALCOHOL DO YOU HAVE ON A TYPICAL DAY: 1 OR 2
AUDIT-C TOTAL SCORE: 1
AUDIT-C TOTAL SCORE: 1

## 2024-07-27 ASSESSMENT — PAIN - FUNCTIONAL ASSESSMENT
PAIN_FUNCTIONAL_ASSESSMENT: 0-10

## 2024-07-27 ASSESSMENT — ACTIVITIES OF DAILY LIVING (ADL)
PATIENT'S MEMORY ADEQUATE TO SAFELY COMPLETE DAILY ACTIVITIES?: YES
ADEQUATE_TO_COMPLETE_ADL: YES
LACK_OF_TRANSPORTATION: NO
GROOMING: INDEPENDENT
WALKS IN HOME: INDEPENDENT
JUDGMENT_ADEQUATE_SAFELY_COMPLETE_DAILY_ACTIVITIES: YES
ASSISTIVE_DEVICE: DENTURES UPPER
BATHING: INDEPENDENT
HEARING - RIGHT EAR: FUNCTIONAL
HEARING - LEFT EAR: FUNCTIONAL
FEEDING YOURSELF: INDEPENDENT
DRESSING YOURSELF: INDEPENDENT
TOILETING: INDEPENDENT

## 2024-07-27 ASSESSMENT — COLUMBIA-SUICIDE SEVERITY RATING SCALE - C-SSRS
2. HAVE YOU ACTUALLY HAD ANY THOUGHTS OF KILLING YOURSELF?: NO
1. IN THE PAST MONTH, HAVE YOU WISHED YOU WERE DEAD OR WISHED YOU COULD GO TO SLEEP AND NOT WAKE UP?: NO
6. HAVE YOU EVER DONE ANYTHING, STARTED TO DO ANYTHING, OR PREPARED TO DO ANYTHING TO END YOUR LIFE?: NO

## 2024-07-27 ASSESSMENT — PAIN SCALES - GENERAL
PAINLEVEL_OUTOF10: 4
PAINLEVEL_OUTOF10: 10 - WORST POSSIBLE PAIN
PAINLEVEL_OUTOF10: 9
PAINLEVEL_OUTOF10: 7
PAINLEVEL_OUTOF10: 8
PAINLEVEL_OUTOF10: 7
PAINLEVEL_OUTOF10: 8
PAINLEVEL_OUTOF10: 7
PAINLEVEL_OUTOF10: 10 - WORST POSSIBLE PAIN
PAINLEVEL_OUTOF10: 6

## 2024-07-27 ASSESSMENT — PAIN DESCRIPTION - DESCRIPTORS: DESCRIPTORS: STABBING;SHOOTING

## 2024-07-27 ASSESSMENT — PAIN DESCRIPTION - PAIN TYPE: TYPE: ACUTE PAIN

## 2024-07-27 NOTE — NURSING NOTE
Patient given Golytely to drink at 1248. Patient hasn't drank the medication in its entire within the 4 hours. Dr. Sumner made aware and patient was offered an NG tube to get the medication in faster. Patient refused this option and verbalized understanding the importance of the medication. Patient encouraged to drink the remainder of the medication and verbalizes to the RN that she will drink it.

## 2024-07-27 NOTE — CARE PLAN
Problem: Pain - Adult  Goal: Verbalizes/displays adequate comfort level or baseline comfort level  Outcome: Progressing     Problem: Safety - Adult  Goal: Free from fall injury  Outcome: Progressing     Problem: Discharge Planning  Goal: Discharge to home or other facility with appropriate resources  Outcome: Progressing     Problem: Chronic Conditions and Co-morbidities  Goal: Patient's chronic conditions and co-morbidity symptoms are monitored and maintained or improved  Outcome: Progressing     Problem: Pain  Goal: Takes deep breaths with improved pain control throughout the shift  Outcome: Progressing  Goal: Turns in bed with improved pain control throughout the shift  Outcome: Progressing  Goal: Walks with improved pain control throughout the shift  Outcome: Progressing  Goal: Performs ADL's with improved pain control throughout shift  Outcome: Progressing  Goal: Participates in PT with improved pain control throughout the shift  Outcome: Progressing  Goal: Free from opioid side effects throughout the shift  Outcome: Progressing  Goal: Free from acute confusion related to pain meds throughout the shift  Outcome: Progressing     Problem: Fall/Injury  Goal: Not fall by end of shift  Outcome: Progressing  Goal: Be free from injury by end of the shift  Outcome: Progressing  Goal: Verbalize understanding of personal risk factors for fall in the hospital  Outcome: Progressing  Goal: Verbalize understanding of risk factor reduction measures to prevent injury from fall in the home  Outcome: Progressing  Goal: Use assistive devices by end of the shift  Outcome: Progressing  Goal: Pace activities to prevent fatigue by end of the shift  Outcome: Progressing     The patient's goals for the shift include      The clinical goals for the shift include Patient will have adequate pain management during shift

## 2024-07-27 NOTE — H&P
GENERAL SURGERY HISTORY & PHYSICAL    Patient: Barbara Sow  Room: 3334/3334-A    Age: 64 y.o.   Gender: female  Attending: Randi Sumner MD    MRN: [id number]  Admission Date: 7/27/2024    PCP: Maninder Christensen MD         SUBJECTIVE     Chief Complaint  Wound Check (PT Has a colostomy and noticed new blister type wounds have started w/ Increasing pain. )       HPI  Barbara Sow is a 64 y.o. female   Barbara presented to the emergency room late yesterday evening complaining of increased abdominal pain and blood from her colostomy.  She had undergone a diverting loop transverse colon colostomy by Dr. Freeman on 7/19/2024 due to a splenic flexure narrowing and severe chronic constipation.  She was discharged 2 days ago and reports that she was having stool output from the colostomy before she was discharged.  Since discharge, she has been able to eat and drink well.  She has taken only 6 oxycodone pain medications in the 48-hour period.  She did state that she has had a history of chronic narcotic use, but this has been more than a decade ago.  She noticed yesterday significant swelling at her colostomy site and noticed blood clots in the colostomy bag.  She was also having increased crampy abdominal pain, especially in the right abdomen.  She was having some stool output the first day after discharge, but none on day 2 after discharge.  In the emergency room, she underwent CT evaluation which suggested thickening of the colon around the area of the loop colostomy, and a persistent significant stool burden in the right colon.  She was admitted for pain management and further evaluation of her colostomy.  The ER physician was concerned that there was prolapse of her colostomy.    ROS  Review of Systems   Constitutional:  Positive for fatigue. Negative for chills and fever.   HENT:  Negative for congestion, ear pain and hearing loss.    Eyes:  Negative for pain and redness.   Respiratory:  Negative  for cough and shortness of breath.    Cardiovascular:  Negative for chest pain and leg swelling.   Gastrointestinal:  Positive for constipation. Negative for abdominal pain, diarrhea, nausea and vomiting.   Endocrine: Negative for polyphagia.   Genitourinary:  Negative for dysuria, flank pain, frequency and hematuria.   Musculoskeletal:  Positive for arthralgias and myalgias.   Skin:  Negative for rash and wound.   Allergic/Immunologic: Negative for immunocompromised state.   Neurological:  Negative for speech difficulty, weakness and headaches.   Hematological:  Does not bruise/bleed easily.   Psychiatric/Behavioral:  Negative for agitation and confusion. The patient is nervous/anxious.         HISTORY     Past Medical History:   Diagnosis Date    Anxiety and depression     Hypothyroidism, unspecified     Ileus, unspecified (Multi)     SBO (small bowel obstruction) (Multi) 07/18/2024        Past Surgical History:   Procedure Laterality Date    APPENDECTOMY      CHOLECYSTECTOMY      HYSTERECTOMY      x2        Family History   Problem Relation Name Age of Onset    No Known Problems Mother      Heart attack Father      Aneurysm Sister          Allergies   Allergen Reactions    Cefaclor Hives    Cefuroxime Axetil Unknown    Ketorolac Unknown        Social History     Tobacco Use   Smoking Status Every Day    Current packs/day: 0.50    Types: Cigarettes   Smokeless Tobacco Never        Social History     Substance and Sexual Activity   Alcohol Use Yes    Comment: 1 A MONTH        HOME MEDICATIONS  Current Outpatient Medications   Medication Instructions    acetaminophen (TYLENOL) 650 mg, oral, Every 6 hours PRN    docusate sodium (COLACE) 100 mg, oral, 2 times daily    DULoxetine (CYMBALTA) 60 mg, oral, Daily, Do not crush or chew.    gabapentin (Neurontin) 600 mg tablet oral, Take 2 tabs in the morning and 3 tabs at night.    levothyroxine (SYNTHROID, LEVOXYL) 88 mcg, oral, Daily, Except on Sundays take 2 tabs     "oxyCODONE (ROXICODONE) 5 mg, oral, Every 6 hours PRN    polyethylene glycol-electrolytes (GaviLyte-G) 420 gram solution Please refer to the printed instructions that were mailed to you.          OBJECTIVE   Last Recorded Vitals.  Blood pressure 100/64, pulse 80, temperature 36.4 °C (97.6 °F), temperature source Temporal, resp. rate 18, height 1.702 m (5' 7\"), weight 62.1 kg (137 lb), SpO2 93%.     PHYSICAL EXAM  Physical Exam   General: Well-developed, well-nourished and in no acute distress.  Thin body habitus.  Head: Normocephalic. Atraumatic.  Neck/thyroid: Neck is supple.   Eyes: Pupils equal round and reactive to light. Conjunctiva normal.  ENMT: No masses or deformity of external nose. External ears without masses.  Respiratory/Chest:  Normal respiratory effort.  Cardiovascular: Regular rate and rhythm.   Abdomen: Abdomen is distended but soft.  Loop colostomy in the mid epigastric region with significant edema.  No evidence of prolapse.  No active bleeding.  Small amount of mesentery visible on the upper left quadrant.  Ostomy bar still in place.  Small amount of stool at the proximal ostomy opening.  Digital manipulation of the proximal ostomy showed that the ostomy is patent and no significant narrowing at the level of the fascia.  Moderate amount of soft, palpable stool.  Musculoskeletal: Joints and limbs are grossly normal. Normal gait. Normal range of motion of major joints.  Neuro: Oriented to person, place and time. No obvious neurological deficit. Motor strength grossly normal.  Psych: Normal mood and affect.    RESULTS   Labs  Results for orders placed or performed during the hospital encounter of 07/27/24 (from the past 24 hour(s))   CBC and Auto Differential   Result Value Ref Range    WBC 11.0 4.4 - 11.3 x10*3/uL    nRBC 0.0 0.0 - 0.0 /100 WBCs    RBC 3.21 (L) 4.00 - 5.20 x10*6/uL    Hemoglobin 9.5 (L) 12.0 - 16.0 g/dL    Hematocrit 29.6 (L) 36.0 - 46.0 %    MCV 92 80 - 100 fL    MCH 29.6 26.0 - " 34.0 pg    MCHC 32.1 32.0 - 36.0 g/dL    RDW 15.7 (H) 11.5 - 14.5 %    Platelets 613 (H) 150 - 450 x10*3/uL    Neutrophils % 54.9 40.0 - 80.0 %    Immature Granulocytes %, Automated 1.6 (H) 0.0 - 0.9 %    Lymphocytes % 27.8 13.0 - 44.0 %    Monocytes % 8.9 2.0 - 10.0 %    Eosinophils % 4.8 0.0 - 6.0 %    Basophils % 2.0 0.0 - 2.0 %    Neutrophils Absolute 6.04 1.20 - 7.70 x10*3/uL    Immature Granulocytes Absolute, Automated 0.18 0.00 - 0.70 x10*3/uL    Lymphocytes Absolute 3.06 1.20 - 4.80 x10*3/uL    Monocytes Absolute 0.98 0.10 - 1.00 x10*3/uL    Eosinophils Absolute 0.53 0.00 - 0.70 x10*3/uL    Basophils Absolute 0.22 (H) 0.00 - 0.10 x10*3/uL   Comprehensive metabolic panel   Result Value Ref Range    Glucose 84 74 - 99 mg/dL    Sodium 134 (L) 136 - 145 mmol/L    Potassium 4.5 3.5 - 5.3 mmol/L    Chloride 97 (L) 98 - 107 mmol/L    Bicarbonate 30 21 - 32 mmol/L    Anion Gap 12 10 - 20 mmol/L    Urea Nitrogen 20 6 - 23 mg/dL    Creatinine 0.85 0.50 - 1.05 mg/dL    eGFR 76 >60 mL/min/1.73m*2    Calcium 9.1 8.6 - 10.3 mg/dL    Albumin 3.4 3.4 - 5.0 g/dL    Alkaline Phosphatase 86 33 - 136 U/L    Total Protein 6.5 6.4 - 8.2 g/dL    AST 22 9 - 39 U/L    Bilirubin, Total 0.2 0.0 - 1.2 mg/dL    ALT 9 7 - 45 U/L   Lipase   Result Value Ref Range    Lipase 43 9 - 82 U/L   Lactate   Result Value Ref Range    Lactate 0.9 0.4 - 2.0 mmol/L       Radiology Resutls  CT abdomen pelvis w IV contrast    Result Date: 7/27/2024  Interpreted By:  Ede Umana, STUDY: CT ABDOMEN PELVIS W IV CONTRAST; ;  7/27/2024 4:52 am   INDICATION: Signs/Symptoms:recent colosomy, now with abd pain and distension with colostomy herniation.   COMPARISON: 07/18/2024.   ACCESSION NUMBER(S): [id number]   ORDERING CLINICIAN: GURINDER DOAN   TECHNIQUE: Axial CT images of the abdomen and pelvis with coronal and sagittal reconstructed images performed after intravenous administration of 75 cc Omnipaque 350.   FINDINGS: LOWER CHEST: No acute abnormality  of the lung bases. Mild dependent atelectasis. Normal heart size. Circumferential submucosal edema in the distal esophagus suggesting reflux esophagitis. BONES: No acute osseous abnormality. Trace degenerative anterolisthesis at L1-L2. Mild degenerative changes of the imaged spine. ABDOMINAL WALL: Paramedian right upper quadrant colostomy, with cotton swab through a mesocolonic defect holding herniated everted colon in place. No subcutaneous air.   ABDOMEN:   LIVER: Enlarged. No focal lesion. BILE DUCTS: Normal caliber. GALLBLADDER: Absent. PANCREAS: Within normal limits. SPLEEN: Within normal limits. ADRENALS: Within normal limits. KIDNEYS and URETERS: 4 mm nonobstructing calculus in the interpolar left kidney. Otherwise, normal.     VESSELS: Moderate to severe partially calcified atherosclerosis of the abdominal aorta and branch vessels. RETROPERITONEUM: No pathologically enlarged retroperitoneal lymph nodes.   PELVIS:   REPRODUCTIVE ORGANS: No pelvic masses. BLADDER: Within normal limits.   BOWEL: Stomach is moderately distended and appears within normal limits. No dilated or thickened small bowel. There is mural thickening of the colon about a loop colostomy in the paramedian right upper quadrant, compatible with colitis. There is herniation of the everted edematous colon at the stoma site. There is a large volume of stool proximal to the stoma suggesting constipation, without kaden colonic dilation to suggest obstruction. There is retained hyperdense contrast in the colonic lumen distal to the inflamed colon.  Appendix is not identified with certainty but no pericecal inflammatory changes are seen to suggest acute appendicitis. PERITONEUM: No ascites or free air, no fluid collection.       There is mural thickening of the colon about a loop colostomy in the paramedian right upper quadrant, compatible with colitis. There is herniation of the everted edematous colon at the stoma site. There is a large volume of  "stool proximal to the stoma suggesting constipation, without kaden colonic dilation to suggest obstruction. There is retained hyperdense contrast in the colonic lumen distal to the inflamed colon.   No subcutaneous air or fluid collection.   4 mm nonobstructing calculus in the interpolar left kidney.   Circumferential submucosal edema in the distal esophagus suggesting reflux esophagitis.   Hepatomegaly.   Cholecystectomy.   Additional findings as discussed above.     MACRO: None   Signed by: Ede Umana 7/27/2024 5:06 AM Dictation workstation:   [id number]        ASSESSMENT / PLAN   Active Problems:    Constipation    Colostomy malfunction (Multi)    Lower GI bleed       Plan  1.  Reviewed with the patient that there is no prolapse of her transverse colon loop colostomy.  This is just a large amount of edema associated with the ostomy.  Some sugar was placed on the ostomy to help reduce some of the swelling, but most of the swelling will just take time to resolve.  2.  There is no evidence of obstruction by digital manipulation of the ostomy.  Feel that her crampy abdominal pain is due to her chronic constipation.  She still has a fairly large stool burden in the remainder of the transverse and right colon.  Will give GoLytely to try to clean out the rest of the stool.  3.  She does have a history of chronic narcotic use.  Discussed that we cannot make her pain-free with pain medication, but will try to make her pain tolerable.  Also discussed how narcotics can worsen the constipation which is what we are trying to treat.  4.  She does have a stricture near the splenic flexure per the previous radiographs.  Outpatient follow-up for the stricture as per Dr. Freeman.  5.  There is no evidence of active bleeding.  The ER physician noted \"clots\" in the colostomy pouch.  On examination at the bedside, there is no blood in the colostomy pouch and no evidence of active bleeding.  She likely has serosanguineous fluid " from the edema within the colostomy pouch.  Hemoglobin level is stable.  Continue to monitor with daily hemoglobin level.    Randi Sumner MD, FACS  [facility]  [address] [place] [address];   [employer] [address]; [address]  [place]  [address] [phone]

## 2024-07-27 NOTE — ED PROVIDER NOTES
HPI   Chief Complaint   Patient presents with    Wound Check     PT Has a colostomy and noticed new blister type wounds have started w/ Increasing pain.        64-year-old female with past medical history of recent bowel obstruction status post colostomy presents to ED with abdominal pain.  Patient had a bowel obstruction that was surgically repaired on Friday and had a colostomy placed.  Was discharged the other day.  Since at that time was having worsening abdominal distention and abdominal pain.  Also noticed some blood clotting in her colostomy bag.  No nausea or vomiting.  No fevers or chills.              Patient History   Past Medical History:   Diagnosis Date    Anxiety and depression     Hypothyroidism, unspecified     Ileus, unspecified (Multi)     SBO (small bowel obstruction) (Multi) 07/18/2024     Past Surgical History:   Procedure Laterality Date    APPENDECTOMY      CHOLECYSTECTOMY      HYSTERECTOMY      x2     Family History   Problem Relation Name Age of Onset    No Known Problems Mother      Heart attack Father      Aneurysm Sister       Social History     Tobacco Use    Smoking status: Every Day     Current packs/day: 0.50     Types: Cigarettes    Smokeless tobacco: Never   Substance Use Topics    Alcohol use: Yes     Comment: 1 A MONTH    Drug use: Never       Physical Exam   ED Triage Vitals [07/27/24 0328]   Temperature Heart Rate Respirations BP   36.9 °C (98.4 °F) 91 18 115/64      Pulse Ox Temp src Heart Rate Source Patient Position   97 % -- -- --      BP Location FiO2 (%)     -- --       Physical Exam  Vitals and nursing note reviewed.   Constitutional:       General: She is not in acute distress.     Appearance: She is well-developed.   HENT:      Head: Normocephalic and atraumatic.   Eyes:      Conjunctiva/sclera: Conjunctivae normal.   Cardiovascular:      Rate and Rhythm: Normal rate and regular rhythm.      Heart sounds: No murmur heard.  Pulmonary:      Effort: Pulmonary effort is  normal. No respiratory distress.      Breath sounds: Normal breath sounds.   Abdominal:      General: There is distension.      Palpations: Abdomen is soft.      Tenderness: There is abdominal tenderness. There is guarding.      Comments: Tenderness generalized.  The colostomy site has herniation into the bag with some blood clots within the bag.   Musculoskeletal:         General: No swelling.      Cervical back: Neck supple.   Skin:     General: Skin is warm and dry.      Capillary Refill: Capillary refill takes less than 2 seconds.   Neurological:      Mental Status: She is alert.   Psychiatric:         Mood and Affect: Mood normal.           ED Course & MDM   Diagnoses as of 07/27/24 0522   Colostomy hernia (Multi)                       Celsa Coma Scale Score: 15                        Medical Decision Making  HISTORIAN:  Patient    CHART REVIEW:  Patient mid to this hospital and on July 19 had a large bowel obstruction which was repaired with colostomy bag.    PT SUMMARY:  64-year-old female presents ED with abdominal pain and bleeding from colostomy site.    DDX:  Colostomy herniation, bowel obstruction, bowel necrosis    PLAN:  Obtain CBC, CMP, lipase, lactate and CT abdomen pelvis    DISPO/RE-EVAL:  Labs show no acute abnormalities.  CT abdomen pelvis shows colostomy herniation with colitis.  With the findings on CT and her exam I spoke with , who recommended surgical admission for hernia reduction.  Will place an ice pack on hernia site.  Patient admitted in stable condition.          Procedure  Procedures     Topher Malcolm DO  07/27/24 0585

## 2024-07-28 ENCOUNTER — APPOINTMENT (OUTPATIENT)
Dept: RADIOLOGY | Facility: HOSPITAL | Age: 64
End: 2024-07-28
Payer: MEDICARE

## 2024-07-28 VITALS
WEIGHT: 137 LBS | HEART RATE: 82 BPM | RESPIRATION RATE: 20 BRPM | SYSTOLIC BLOOD PRESSURE: 115 MMHG | TEMPERATURE: 98.6 F | BODY MASS INDEX: 21.5 KG/M2 | HEIGHT: 67 IN | DIASTOLIC BLOOD PRESSURE: 75 MMHG | OXYGEN SATURATION: 98 %

## 2024-07-28 LAB
ANION GAP SERPL CALC-SCNC: 11 MMOL/L (ref 10–20)
BUN SERPL-MCNC: 10 MG/DL (ref 6–23)
CALCIUM SERPL-MCNC: 8.6 MG/DL (ref 8.6–10.3)
CHLORIDE SERPL-SCNC: 103 MMOL/L (ref 98–107)
CO2 SERPL-SCNC: 25 MMOL/L (ref 21–32)
CREAT SERPL-MCNC: 0.55 MG/DL (ref 0.5–1.05)
EGFRCR SERPLBLD CKD-EPI 2021: >90 ML/MIN/1.73M*2
ERYTHROCYTE [DISTWIDTH] IN BLOOD BY AUTOMATED COUNT: 15.7 % (ref 11.5–14.5)
GLUCOSE SERPL-MCNC: 95 MG/DL (ref 74–99)
HCT VFR BLD AUTO: 29.3 % (ref 36–46)
HGB BLD-MCNC: 9.1 G/DL (ref 12–16)
MCH RBC QN AUTO: 28.2 PG (ref 26–34)
MCHC RBC AUTO-ENTMCNC: 31.1 G/DL (ref 32–36)
MCV RBC AUTO: 91 FL (ref 80–100)
NRBC BLD-RTO: 0 /100 WBCS (ref 0–0)
PLATELET # BLD AUTO: 633 X10*3/UL (ref 150–450)
POTASSIUM SERPL-SCNC: 4.3 MMOL/L (ref 3.5–5.3)
RBC # BLD AUTO: 3.23 X10*6/UL (ref 4–5.2)
SODIUM SERPL-SCNC: 135 MMOL/L (ref 136–145)
WBC # BLD AUTO: 8.2 X10*3/UL (ref 4.4–11.3)

## 2024-07-28 PROCEDURE — 99024 POSTOP FOLLOW-UP VISIT: CPT | Performed by: SURGERY

## 2024-07-28 PROCEDURE — 2500000001 HC RX 250 WO HCPCS SELF ADMINISTERED DRUGS (ALT 637 FOR MEDICARE OP): Performed by: SURGERY

## 2024-07-28 PROCEDURE — 36415 COLL VENOUS BLD VENIPUNCTURE: CPT | Performed by: SURGERY

## 2024-07-28 PROCEDURE — 1210000001 HC SEMI-PRIVATE ROOM DAILY

## 2024-07-28 PROCEDURE — 85027 COMPLETE CBC AUTOMATED: CPT | Performed by: SURGERY

## 2024-07-28 PROCEDURE — 80051 ELECTROLYTE PANEL: CPT | Performed by: SURGERY

## 2024-07-28 PROCEDURE — 74018 RADEX ABDOMEN 1 VIEW: CPT | Performed by: RADIOLOGY

## 2024-07-28 PROCEDURE — 2500000004 HC RX 250 GENERAL PHARMACY W/ HCPCS (ALT 636 FOR OP/ED): Performed by: SURGERY

## 2024-07-28 PROCEDURE — 74018 RADEX ABDOMEN 1 VIEW: CPT

## 2024-07-28 PROCEDURE — 2500000002 HC RX 250 W HCPCS SELF ADMINISTERED DRUGS (ALT 637 FOR MEDICARE OP, ALT 636 FOR OP/ED): Performed by: SURGERY

## 2024-07-28 RX ORDER — POLYETHYLENE GLYCOL 3350, SODIUM CHLORIDE, SODIUM BICARBONATE, POTASSIUM CHLORIDE 420; 11.2; 5.72; 1.48 G/4L; G/4L; G/4L; G/4L
1000 POWDER, FOR SOLUTION ORAL ONCE
Status: COMPLETED | OUTPATIENT
Start: 2024-07-28 | End: 2024-07-28

## 2024-07-28 ASSESSMENT — COGNITIVE AND FUNCTIONAL STATUS - GENERAL
DAILY ACTIVITIY SCORE: 23
WALKING IN HOSPITAL ROOM: A LITTLE
WALKING IN HOSPITAL ROOM: A LITTLE
DRESSING REGULAR LOWER BODY CLOTHING: A LITTLE
CLIMB 3 TO 5 STEPS WITH RAILING: A LITTLE
HELP NEEDED FOR BATHING: A LITTLE
MOBILITY SCORE: 22
CLIMB 3 TO 5 STEPS WITH RAILING: A LITTLE
DAILY ACTIVITIY SCORE: 22
MOBILITY SCORE: 22
DRESSING REGULAR LOWER BODY CLOTHING: A LITTLE

## 2024-07-28 ASSESSMENT — PAIN - FUNCTIONAL ASSESSMENT
PAIN_FUNCTIONAL_ASSESSMENT: 0-10

## 2024-07-28 ASSESSMENT — PAIN SCALES - GENERAL
PAINLEVEL_OUTOF10: 0 - NO PAIN
PAINLEVEL_OUTOF10: 5 - MODERATE PAIN
PAINLEVEL_OUTOF10: 5 - MODERATE PAIN
PAINLEVEL_OUTOF10: 0 - NO PAIN
PAINLEVEL_OUTOF10: 6
PAINLEVEL_OUTOF10: 8
PAINLEVEL_OUTOF10: 7

## 2024-07-28 NOTE — PROGRESS NOTES
"    GENERAL SURGERY / TRAUMA PROGRESS NOTE    Patient: Barbara Sow  Room: 3334/3334-A    Age: 64 y.o.   Gender: female  Attending: Randi Sunmer MD    MRN: [id number]  Admission Date: 7/27/2024    PCP: Maninder Christensen MD       Barbara Sow is a 64 y.o. female on day 1  of admission presenting with No Principal Problem: There is no principal problem currently on the Problem List. Please update the Problem List and refresh..    SUBJECTIVE   Interval History:  Took several hours to drink her 1 L of GoLytely yesterday.  However, she has had significant stool output measuring at least 1400 cc.  Abdominal pain is much improved.  Able to tolerate diet.    ROS  Review of Systems   Constitutional:  Positive for fatigue. Negative for chills and fever.   HENT:  Negative for congestion, ear pain and hearing loss.    Eyes:  Negative for pain and redness.   Respiratory:  Negative for cough and shortness of breath.    Cardiovascular:  Negative for chest pain and leg swelling.   Gastrointestinal:  Negative for abdominal pain, diarrhea, nausea and vomiting.   Endocrine: Negative for polyphagia.   Genitourinary:  Negative for dysuria, flank pain, frequency and hematuria.   Musculoskeletal:  Positive for arthralgias and myalgias.   Skin:  Negative for rash and wound.   Allergic/Immunologic: Negative for immunocompromised state.   Neurological:  Negative for speech difficulty, weakness and headaches.   Hematological:  Does not bruise/bleed easily.   Psychiatric/Behavioral:  Negative for agitation and confusion. The patient is nervous/anxious.      OBJECTIVE   Last Recorded Vitals  Blood pressure 131/77, pulse 99, temperature 36.6 °C (97.9 °F), temperature source Temporal, resp. rate 19, height 1.702 m (5' 7\"), weight 62.1 kg (137 lb), SpO2 94%.    Intake/Output last 3 Shifts:  I/O last 3 completed shifts:  In: 2907.3 (46.8 mL/kg) [P.O.:420; IV Piggyback:2487.3]  Out: 1400 (22.5 mL/kg) [Stool:1400]  Weight: 62.1 kg "     PHYSICAL EXAM  Physical Exam   General: Well-developed, well-nourished and in no acute distress.  Thin body habitus.  Head: Normocephalic. Atraumatic.  Neck/thyroid: Neck is supple.   Eyes: Pupils equal round and reactive to light. Conjunctiva normal.  ENMT: No masses or deformity of external nose. External ears without masses.  Respiratory/Chest:  Normal respiratory effort.  Cardiovascular: Regular rate and rhythm.   Abdomen: Significant decrease in distention.  Abdomen is soft.  Nontender.  Loop colostomy in the mid epigastric region with significant edema, but slightly decreased from yesterday.  No evidence of prolapse.  No active bleeding.  Small amount of mesentery visible on the upper left quadrant.  Ostomy bar still in place.  Large amount of soft stool in ostomy bag.  Musculoskeletal: Joints and limbs are grossly normal. Normal gait. Normal range of motion of major joints.  Neuro: Oriented to person, place and time. No obvious neurological deficit. Motor strength grossly normal.  Psych: Normal mood and affect.    RESULTS   Labs  Results for orders placed or performed during the hospital encounter of 07/27/24 (from the past 24 hour(s))   CBC   Result Value Ref Range    WBC 8.2 4.4 - 11.3 x10*3/uL    nRBC 0.0 0.0 - 0.0 /100 WBCs    RBC 3.23 (L) 4.00 - 5.20 x10*6/uL    Hemoglobin 9.1 (L) 12.0 - 16.0 g/dL    Hematocrit 29.3 (L) 36.0 - 46.0 %    MCV 91 80 - 100 fL    MCH 28.2 26.0 - 34.0 pg    MCHC 31.1 (L) 32.0 - 36.0 g/dL    RDW 15.7 (H) 11.5 - 14.5 %    Platelets 633 (H) 150 - 450 x10*3/uL   Basic metabolic panel   Result Value Ref Range    Glucose 95 74 - 99 mg/dL    Sodium 135 (L) 136 - 145 mmol/L    Potassium 4.3 3.5 - 5.3 mmol/L    Chloride 103 98 - 107 mmol/L    Bicarbonate 25 21 - 32 mmol/L    Anion Gap 11 10 - 20 mmol/L    Urea Nitrogen 10 6 - 23 mg/dL    Creatinine 0.55 0.50 - 1.05 mg/dL    eGFR >90 >60 mL/min/1.73m*2    Calcium 8.6 8.6 - 10.3 mg/dL       Radiology Resutls  CT abdomen pelvis w IV  contrast    Result Date: 7/27/2024  Interpreted By:  Ede Umana, STUDY: CT ABDOMEN PELVIS W IV CONTRAST; ;  7/27/2024 4:52 am   INDICATION: Signs/Symptoms:recent colosomy, now with abd pain and distension with colostomy herniation.   COMPARISON: 07/18/2024.   ACCESSION NUMBER(S): [id number]   ORDERING CLINICIAN: GURINDER DOAN   TECHNIQUE: Axial CT images of the abdomen and pelvis with coronal and sagittal reconstructed images performed after intravenous administration of 75 cc Omnipaque 350.   FINDINGS: LOWER CHEST: No acute abnormality of the lung bases. Mild dependent atelectasis. Normal heart size. Circumferential submucosal edema in the distal esophagus suggesting reflux esophagitis. BONES: No acute osseous abnormality. Trace degenerative anterolisthesis at L1-L2. Mild degenerative changes of the imaged spine. ABDOMINAL WALL: Paramedian right upper quadrant colostomy, with cotton swab through a mesocolonic defect holding herniated everted colon in place. No subcutaneous air.   ABDOMEN:   LIVER: Enlarged. No focal lesion. BILE DUCTS: Normal caliber. GALLBLADDER: Absent. PANCREAS: Within normal limits. SPLEEN: Within normal limits. ADRENALS: Within normal limits. KIDNEYS and URETERS: 4 mm nonobstructing calculus in the interpolar left kidney. Otherwise, normal.     VESSELS: Moderate to severe partially calcified atherosclerosis of the abdominal aorta and branch vessels. RETROPERITONEUM: No pathologically enlarged retroperitoneal lymph nodes.   PELVIS:   REPRODUCTIVE ORGANS: No pelvic masses. BLADDER: Within normal limits.   BOWEL: Stomach is moderately distended and appears within normal limits. No dilated or thickened small bowel. There is mural thickening of the colon about a loop colostomy in the paramedian right upper quadrant, compatible with colitis. There is herniation of the everted edematous colon at the stoma site. There is a large volume of stool proximal to the stoma suggesting constipation,  without kaden colonic dilation to suggest obstruction. There is retained hyperdense contrast in the colonic lumen distal to the inflamed colon.  Appendix is not identified with certainty but no pericecal inflammatory changes are seen to suggest acute appendicitis. PERITONEUM: No ascites or free air, no fluid collection.       There is mural thickening of the colon about a loop colostomy in the paramedian right upper quadrant, compatible with colitis. There is herniation of the everted edematous colon at the stoma site. There is a large volume of stool proximal to the stoma suggesting constipation, without kaden colonic dilation to suggest obstruction. There is retained hyperdense contrast in the colonic lumen distal to the inflamed colon.   No subcutaneous air or fluid collection.   4 mm nonobstructing calculus in the interpolar left kidney.   Circumferential submucosal edema in the distal esophagus suggesting reflux esophagitis.   Hepatomegaly.   Cholecystectomy.   Additional findings as discussed above.     MACRO: None   Signed by: Ede Umana 7/27/2024 5:06 AM Dictation workstation:   [id number]        ASSESSMENT / PLAN   Active Problems:    Constipation    Colostomy malfunction (Multi)    Lower GI bleed       PLAN  1.  Reviewed with the patient that there is no prolapse of her transverse colon loop colostomy.  This is just a large amount of edema associated with the ostomy.  Some sugar was placed on the ostomy to help reduce some of the swelling, but most of the swelling will just take time to resolve.  2.  There is no evidence of obstruction by digital manipulation of the ostomy.  Feel that her crampy abdominal pain is due to her chronic constipation.  She originally presented with still a fairly large stool burden in the right and proximal transverse colon.  She was given 1 L of GoLytely yesterday with 1400 cc of stool output.  Her abdominal distention is significantly decreased and her abdomen is much more  "soft today.  The KUB today still shows a moderate amount of stool in the proximal transverse colon.  Will give another liter of GoLytely today.  3.  She does have a history of chronic narcotic use.  Discussed that we cannot make her pain-free with pain medication, but will try to make her pain tolerable.  Also discussed how narcotics can worsen the constipation which is what we are trying to treat.  4.  She does have a stricture near the splenic flexure per the previous radiographs.  Outpatient follow-up for the stricture as per Dr. Freeman.  Previous contrast given does reach the rectum.  5.  There is no evidence of active bleeding.  The ER physician noted \"clots\" in the colostomy pouch.  On examination at the bedside, there is no blood in the colostomy pouch and no evidence of active bleeding.  Hemoglobin remained stable.  6.  Likely discharge in the next 24 to 48 hours if continues to have good colostomy output.      Randi Sumner MD, FACS  [facility]  [address] [place] [address];   [address]  [place] [id number] [phone]    "

## 2024-07-29 ENCOUNTER — DOCUMENTATION (OUTPATIENT)
Dept: HOME HEALTH SERVICES | Facility: HOME HEALTH | Age: 64
End: 2024-07-29
Payer: MEDICARE

## 2024-07-29 ENCOUNTER — PHARMACY VISIT (OUTPATIENT)
Dept: PHARMACY | Facility: CLINIC | Age: 64
End: 2024-07-29
Payer: COMMERCIAL

## 2024-07-29 ENCOUNTER — HOME HEALTH ADMISSION (OUTPATIENT)
Dept: HOME HEALTH SERVICES | Facility: HOME HEALTH | Age: 64
End: 2024-07-29
Payer: MEDICARE

## 2024-07-29 VITALS
WEIGHT: 137 LBS | OXYGEN SATURATION: 95 % | DIASTOLIC BLOOD PRESSURE: 70 MMHG | SYSTOLIC BLOOD PRESSURE: 130 MMHG | HEART RATE: 55 BPM | HEIGHT: 67 IN | TEMPERATURE: 98.7 F | BODY MASS INDEX: 21.5 KG/M2 | RESPIRATION RATE: 16 BRPM

## 2024-07-29 PROCEDURE — 2500000004 HC RX 250 GENERAL PHARMACY W/ HCPCS (ALT 636 FOR OP/ED): Performed by: SURGERY

## 2024-07-29 PROCEDURE — 2500000001 HC RX 250 WO HCPCS SELF ADMINISTERED DRUGS (ALT 637 FOR MEDICARE OP): Performed by: SURGERY

## 2024-07-29 PROCEDURE — RXMED WILLOW AMBULATORY MEDICATION CHARGE

## 2024-07-29 PROCEDURE — 2500000002 HC RX 250 W HCPCS SELF ADMINISTERED DRUGS (ALT 637 FOR MEDICARE OP, ALT 636 FOR OP/ED): Performed by: SURGERY

## 2024-07-29 PROCEDURE — 99024 POSTOP FOLLOW-UP VISIT: CPT | Performed by: SURGERY

## 2024-07-29 RX ORDER — POLYETHYLENE GLYCOL 3350 17 G/17G
17 POWDER, FOR SOLUTION ORAL DAILY
Qty: 510 G | Refills: 11 | Status: SHIPPED | OUTPATIENT
Start: 2024-07-30

## 2024-07-29 RX ORDER — IBUPROFEN 400 MG/1
400 TABLET ORAL EVERY 8 HOURS
Status: DISCONTINUED | OUTPATIENT
Start: 2024-07-29 | End: 2024-07-29 | Stop reason: HOSPADM

## 2024-07-29 RX ORDER — POLYETHYLENE GLYCOL 3350 17 G/17G
17 POWDER, FOR SOLUTION ORAL DAILY
Status: DISCONTINUED | OUTPATIENT
Start: 2024-07-29 | End: 2024-07-29 | Stop reason: HOSPADM

## 2024-07-29 RX ORDER — OXYCODONE HYDROCHLORIDE 5 MG/1
5 TABLET ORAL EVERY 6 HOURS PRN
Qty: 15 TABLET | Refills: 0 | Status: SHIPPED | OUTPATIENT
Start: 2024-07-29

## 2024-07-29 RX ORDER — DOCUSATE SODIUM 100 MG/1
100 CAPSULE, LIQUID FILLED ORAL 2 TIMES DAILY
Status: DISCONTINUED | OUTPATIENT
Start: 2024-07-29 | End: 2024-07-29 | Stop reason: HOSPADM

## 2024-07-29 RX ORDER — ACETAMINOPHEN 325 MG/1
650 TABLET ORAL EVERY 8 HOURS
Qty: 30 TABLET | Refills: 0 | Status: SHIPPED | OUTPATIENT
Start: 2024-07-29 | End: 2024-08-03

## 2024-07-29 RX ORDER — HYDROMORPHONE HYDROCHLORIDE 0.2 MG/ML
0.2 INJECTION INTRAMUSCULAR; INTRAVENOUS; SUBCUTANEOUS EVERY 6 HOURS PRN
Status: DISCONTINUED | OUTPATIENT
Start: 2024-07-29 | End: 2024-07-29 | Stop reason: HOSPADM

## 2024-07-29 RX ORDER — IBUPROFEN 400 MG/1
400 TABLET ORAL EVERY 8 HOURS
Qty: 15 TABLET | Refills: 0 | Status: SHIPPED | OUTPATIENT
Start: 2024-07-29 | End: 2024-08-03

## 2024-07-29 RX ORDER — ACETAMINOPHEN 325 MG/1
975 TABLET ORAL EVERY 8 HOURS
Status: DISCONTINUED | OUTPATIENT
Start: 2024-07-29 | End: 2024-07-29 | Stop reason: HOSPADM

## 2024-07-29 RX ORDER — OXYCODONE HYDROCHLORIDE 10 MG/1
10 TABLET ORAL EVERY 6 HOURS PRN
Status: DISCONTINUED | OUTPATIENT
Start: 2024-07-29 | End: 2024-07-29 | Stop reason: HOSPADM

## 2024-07-29 ASSESSMENT — COGNITIVE AND FUNCTIONAL STATUS - GENERAL
CLIMB 3 TO 5 STEPS WITH RAILING: A LITTLE
DAILY ACTIVITIY SCORE: 23
DRESSING REGULAR LOWER BODY CLOTHING: A LITTLE
MOBILITY SCORE: 23

## 2024-07-29 ASSESSMENT — PAIN - FUNCTIONAL ASSESSMENT
PAIN_FUNCTIONAL_ASSESSMENT: 0-10

## 2024-07-29 ASSESSMENT — PAIN SCALES - GENERAL
PAINLEVEL_OUTOF10: 6
PAINLEVEL_OUTOF10: 7
PAINLEVEL_OUTOF10: 6

## 2024-07-29 ASSESSMENT — PAIN DESCRIPTION - LOCATION
LOCATION: ABDOMEN
LOCATION: ABDOMEN

## 2024-07-29 NOTE — NURSING NOTE
Pt being discharged, IV removed, meds to bed delivered, ostomy supplies provided by wound care nurse. Educated on discharge instructions and medications. Family and pt verbalize understanding. No further needs at this time.    stable

## 2024-07-29 NOTE — HH CARE COORDINATION
Home Care received a Referral for Nursing. We have processed the referral for a Start of Care on 7/30-7/31.     If you have any questions or concerns, please feel free to contact us at [phone]. Follow the prompts, enter your five digit zip code, and you will be directed to your care team on EAST 3.

## 2024-07-29 NOTE — PROGRESS NOTES
7/29/24 0953   07/29/24 1012   Discharge Planning   Living Arrangements Alone   Support Systems Children   Assistance Needed None   Type of Residence Private residence   Number of Stairs to Enter Residence 7   Number of Stairs Within Residence 0   Home or Post Acute Services In home services   Type of Home Care Services Home nursing visits   Expected Discharge Disposition HH Services   Does the patient need discharge transport arranged? No   Patient Choice   Provider Choice list and CMS website (https://medicare.gov/care-compare#search) for post-acute Quality and Resource Measure Data were provided and reviewed with: Patient   Patient / Family choosing to utilize agency / facility established prior to hospitalization Yes     Spoke with pt. Pt from home and lives in a house as a second floor apartment alone. Pt appeared alert and oriented. Pt states being independent and richelle use of any assistive devices. Pt states daughter drives and is able to assist pt to obtain meds and get to appointments. Pt recently had an ostomy placed and was set up with [facility] on previous admission. Pt states wanting to continue with [facility] on discharge and denied need for a [facility] list. IMM reviewed with pt. Pt verbalized understanding and signed IMM. Copy given to pt. Pt richelle any other home going needs. Pt aware to reach out if needs arise.   Sigrid De La Vega RN, BSN, [place]/ Ilana DE LEON Supervisor

## 2024-07-29 NOTE — DISCHARGE SUMMARY
Discharge Diagnosis  Constipation    Issues Requiring Follow-Up  Colonic mass/stricture    Test Results Pending At Discharge  Pending Labs       No current pending labs.            Hospital Course    was admitted back to [facility] on 27th July after being discharged on 25th July with increasing abdominal pain, and concerns for bleeding and a colostomy prolapse.  She was admitted for pain control and was assessed to have constipation along with oedema at the colostomy site along with some serosangenous drainage.  She was started on golytely and had return of bowel function.  She was restarted on her diet which she tolerated and was deemed suitable for discharge as she remained haemodynamically stable with no change in her H/H levels.      Pertinent Physical Exam At Time of Discharge  Physical Exam  NAD  RRR  Abd softly distended, colostomy patient with liquidy and past-like stool  Ostomy bridge removed at bedside  [facility]    Home Medications     Medication List      START taking these medications     ibuprofen 400 mg tablet; Take 1 tablet (400 mg) by mouth every 8 hours   for 5 days. Alternate between tylenol and motrin   polyethylene glycol 17 gram/dose powder; Commonly known as: Glycolax,   Miralax; Take 17 g by mouth once daily. Do not fill before July 30, 2024.;   Start taking on: July 30, 2024     CHANGE how you take these medications     acetaminophen 325 mg tablet; Commonly known as: Tylenol; Take 2 tablets   (650 mg) by mouth every 8 hours for 5 days. Alternate between tylenol and   motrin every 4 hours.; What changed: when to take this, reasons to take   this, additional instructions   oxyCODONE 5 mg immediate release tablet; Commonly known as: Roxicodone;   Take 1 tablet (5 mg) by mouth every 6 hours if needed for moderate pain (4   - 6) or severe pain (7 - 10). Only take if regimen of tylenol and motrin   every 4 hours is not relieving pain.  If having to take consistently,   remember to take stool  softeners also prescribed.; What changed: reasons   to take this, additional instructions     CONTINUE taking these medications     docusate sodium 100 mg capsule; Commonly known as: Colace; Take 1   capsule (100 mg) by mouth 2 times a day for 10 doses.   DULoxetine 60 mg DR capsule; Commonly known as: Cymbalta   gabapentin 600 mg tablet; Commonly known as: Neurontin   GaviLyte-G 236-22.74-6.74 -5.86 gram solution; Generic drug:   polyethylene glycol-electrolytes; Please refer to the printed instructions   that were mailed to you.   levothyroxine 88 mcg tablet; Commonly known as: Synthroid, Levoxyl       Outpatient Follow-Up  Future Appointments   Date Time Provider Department [place]   8/1/2024  4:00 PM Maninder Christensen MD [id number] [facility]   8/9/2024  9:00 AM Sharee Freeman MD [id number] [facility]   10/28/2024  2:00 PM Maninder Christensen MD [id number] [facility]   4/29/2025  3:00 PM Maninder Christensen MD [id number] [facility]       Sharee Freeman MD

## 2024-07-29 NOTE — CARE PLAN
Problem: Pain - Adult  Goal: Verbalizes/displays adequate comfort level or baseline comfort level  Outcome: Progressing  Flowsheets (Taken 7/29/2024 3927)  Verbalizes/displays adequate comfort level or baseline comfort level:   Encourage patient to monitor pain and request assistance   Assess pain using appropriate pain scale   Administer analgesics based on type and severity of pain and evaluate response   Implement non-pharmacological measures as appropriate and evaluate response     Problem: Safety - Adult  Goal: Free from fall injury  Outcome: Progressing     Problem: Discharge Planning  Goal: Discharge to home or other facility with appropriate resources  Outcome: Progressing     Problem: Chronic Conditions and Co-morbidities  Goal: Patient's chronic conditions and co-morbidity symptoms are monitored and maintained or improved  Outcome: Progressing     Problem: Pain  Goal: Takes deep breaths with improved pain control throughout the shift  Outcome: Progressing  Goal: Turns in bed with improved pain control throughout the shift  Outcome: Progressing  Goal: Walks with improved pain control throughout the shift  Outcome: Progressing  Goal: Performs ADL's with improved pain control throughout shift  Outcome: Progressing  Goal: Participates in PT with improved pain control throughout the shift  Outcome: Progressing  Goal: Free from opioid side effects throughout the shift  Outcome: Progressing  Goal: Free from acute confusion related to pain meds throughout the shift  Outcome: Progressing     Problem: Fall/Injury  Goal: Not fall by end of shift  Outcome: Progressing  Goal: Be free from injury by end of the shift  Outcome: Progressing  Goal: Verbalize understanding of personal risk factors for fall in the hospital  Outcome: Progressing  Goal: Verbalize understanding of risk factor reduction measures to prevent injury from fall in the home  Outcome: Progressing  Goal: Use assistive devices by end of the  shift  Outcome: Progressing  Goal: Pace activities to prevent fatigue by end of the shift  Outcome: Progressing   The patient's goals for the shift include      The clinical goals for the shift include Patient will have adequate pain management during shift

## 2024-07-29 NOTE — CONSULTS
Ostomy Care Consult     Visit Date: 7/29/2024      Patient Name: Barbara Sow         MRN: [id number]           YOB: 1960     Patient seen for relatively new loop ostomy done on 7/19. Patient was seen for ostomy education last admission by nurse covering wound care. Patient has daughter Krystyna at bedside for support who assists her at home as well. Ostomy pouch was changed last night, clean and intact. Verbal demonstration and ostomy supply overview provided as refresher. All questions answered to patient and daughters satisfaction. Additional supplies provided for patient to take home.     Assessment  Ostomy Type: loop colostomy  Size: 64mm  Color: red/protruding   Protruding: yes  Functioning: brown, loose stool   Mucocutaneous junction: not assessed   Peristomal Skin:  not assessed   Pouching: one piece flat pouch in place. (Changed last night)    Education: review of [employer] program; literature including: Routine Care of Your Ostomy, One-Piece Pouching System, Adapt Barrier Rings, Peristomal Skin Care and Lock 'n Roll Microseal Closure. QR code sheet Of [employer] Ostomy Educational Videos provided for patients family to review [employer] educational videos. Additional literature including: Ostomy 101. When to call Your Doctor, Medication Considerations with an Ostomy, Food Reference Guide, Obtaining Ostomy Supplies List, Bowel Management Instructions and Output Chart, Resources for Ostomy Supplies, Ostomy Product Manufacturers List, Resources/ Organizations and Ostomy Support Groups provide within folder. All questions answered to patient satisfaction during visit.    Plan: Patient to d/c today with [facility]. Empty pouch atleast 3 times a day. Change pouch every 3 days/prn for leaking. Will enroll patient in [employer] after exam per her request.     Please contact me with questions or changes in patient condition.  Mariana Esparza RN  Wound/Ostomy  Care   [phone]

## 2024-07-30 ENCOUNTER — PATIENT OUTREACH (OUTPATIENT)
Dept: CARE COORDINATION | Facility: CLINIC | Age: 64
End: 2024-07-30
Payer: MEDICARE

## 2024-07-30 NOTE — PROGRESS NOTES
Discharge Facility:[facility]  Discharge Diagnosis:Constipation  Admission Date:07/27/24  Discharge Date: 07/29/24    PCP Appointment Date:08/01/24  Specialist Appointment Date:   Hospital Encounter and Summary Linked: Yes  See discharge assessment below for further details  Engagement  Call Start Time: 0853 (7/30/2024  8:52 AM)    Medications  Medications reviewed with patient/caregiver?: Yes (miralax ibuprofen 400mg) (7/30/2024  8:52 AM)  Is the patient having any side effects they believe may be caused by any medication additions or changes?: No (7/30/2024  8:52 AM)  Does the patient have all medications ordered at discharge?: Yes (7/30/2024  8:52 AM)  Is the patient taking all medications as directed (includes completed medication regime)?: Yes (7/30/2024  8:52 AM)    Appointments  Does the patient have a primary care provider?: Yes (7/30/2024  8:52 AM)  Care Management Interventions: Verified appointment date/time/provider (7/30/2024  8:52 AM)    Self Management  Has home health visited the patient within 72 hours of discharge?: Not applicable (7/30/2024  8:52 AM)  Has all Durable Medical Equipment (DME) been delivered?: No (7/30/2024  8:52 AM)    Patient Teaching  Does the patient have access to their discharge instructions?: Yes (7/30/2024  8:52 AM)  Care Management Interventions: Reviewed instructions with patient (7/30/2024  8:52 AM)  What is the patient's perception of their health status since discharge?: Improving (7/30/2024  8:52 AM)    Wrap Up  Call End Time: 0900 (7/30/2024  8:52 AM)

## 2024-08-01 ENCOUNTER — APPOINTMENT (OUTPATIENT)
Dept: PRIMARY CARE | Facility: CLINIC | Age: 64
End: 2024-08-01
Payer: MEDICARE

## 2024-08-01 VITALS
DIASTOLIC BLOOD PRESSURE: 79 MMHG | HEIGHT: 65 IN | BODY MASS INDEX: 20.16 KG/M2 | WEIGHT: 121 LBS | TEMPERATURE: 98.4 F | OXYGEN SATURATION: 94 % | SYSTOLIC BLOOD PRESSURE: 145 MMHG | HEART RATE: 101 BPM

## 2024-08-01 DIAGNOSIS — K56.609 LARGE BOWEL OBSTRUCTION (MULTI): ICD-10-CM

## 2024-08-01 DIAGNOSIS — M79.2 NEUROPATHIC PAIN: ICD-10-CM

## 2024-08-01 DIAGNOSIS — K94.03: ICD-10-CM

## 2024-08-01 DIAGNOSIS — Z09 HOSPITAL DISCHARGE FOLLOW-UP: Primary | ICD-10-CM

## 2024-08-01 DIAGNOSIS — E03.9 ACQUIRED HYPOTHYROIDISM: ICD-10-CM

## 2024-08-01 PROCEDURE — 3008F BODY MASS INDEX DOCD: CPT | Performed by: INTERNAL MEDICINE

## 2024-08-01 PROCEDURE — 1157F ADVNC CARE PLAN IN RCRD: CPT | Performed by: INTERNAL MEDICINE

## 2024-08-01 PROCEDURE — 1159F MED LIST DOCD IN RCRD: CPT | Performed by: INTERNAL MEDICINE

## 2024-08-01 PROCEDURE — 1111F DSCHRG MED/CURRENT MED MERGE: CPT | Performed by: INTERNAL MEDICINE

## 2024-08-01 PROCEDURE — 99495 TRANSJ CARE MGMT MOD F2F 14D: CPT | Performed by: INTERNAL MEDICINE

## 2024-08-01 PROCEDURE — 1123F ACP DISCUSS/DSCN MKR DOCD: CPT | Performed by: INTERNAL MEDICINE

## 2024-08-01 RX ORDER — LEVOTHYROXINE SODIUM 100 UG/1
100 TABLET ORAL DAILY
Qty: 90 TABLET | Refills: 1 | Status: SHIPPED | OUTPATIENT
Start: 2024-08-01 | End: 2025-01-28

## 2024-08-01 RX ORDER — GABAPENTIN 600 MG/1
TABLET ORAL
Qty: 450 TABLET | Refills: 1 | Status: SHIPPED | OUTPATIENT
Start: 2024-08-01

## 2024-08-01 NOTE — PROGRESS NOTES
PCP: Maninder Christensen MD   I have reviewed existing histories, notes, past medical history, surgical history, social history, family history, med list, allergy list, and immunization, and updated.    HPI:    hospital Follow up. See below two hospital stay course. Pt is doing well. No Abdominal pain. Tolerating liquid, and diet. No diarrhea or constipation. No symptoms of UTI. No Fever and chills. No sob cp or calf edema.  TSH while done in the hospital still high. So will increase dose from 88 to 100mcg.   She has Follow up with me in October.      Second Hospital Course    was admitted back to [facility] on 27th July after being discharged on 25th July with increasing abdominal pain, and concerns for bleeding and a colostomy prolapse.  She was admitted for pain control and was assessed to have constipation along with oedema at the colostomy site along with some serosanguinous drainage.  She was started on Golytely and had return of bowel function.  She was restarted on her diet which she tolerated and was deemed suitable for discharge as she remained hemodynamically stable with no change in her H/H levels.    Her first hospital stay DC summary:  First Hospital Course   Patient is a 64-year-old female who presented with a large bowel obstruction.  Patient had scheduled colonoscopy months prior but decided to forego procedure patient was taken to the operating room on July 19 for transverse loop colostomy.  For further details please refer to operative report.  Patient tolerated procedure well.  Patient had NG tube placed postoperatively which eventually was removed.  Patient was started on liquid diet and transition to regular food slowly. On POD 6 , the patient was deemed fit for discharge. At the time of discharge, the patient was ambulating ad vicki, tolerating a regular diet, and pain was well-controlled on an oral regimen. The patient was educated on postoperative activity restrictions, wound care, and pain  management. The patient will be discharged home with  in stable condition. She will follow up with Dr. Freeman in the outpatient setting in two weeks.   Medication List            START taking these medications     ibuprofen 400 mg tablet; Take 1 tablet (400 mg) by mouth every 8 hours   for 5 days. Alternate between tylenol and motrin   polyethylene glycol 17 gram/dose powder; Commonly known as: Glycolax,   Miralax; Take 17 g by mouth once daily. Do not fill before July 30, 2024.;   Start taking on: July 30, 2024     CHANGE how you take these medications     acetaminophen 325 mg tablet; Commonly known as: Tylenol; Take 2 tablets   (650 mg) by mouth every 8 hours for 5 days. Alternate between tylenol and   motrin every 4 hours.; What changed: when to take this, reasons to take   this, additional instructions   oxyCODONE 5 mg immediate release tablet; Commonly known as: Roxicodone;   Take 1 tablet (5 mg) by mouth every 6 hours if needed for moderate pain (4   - 6) or severe pain (7 - 10). Only take if regimen of tylenol and motrin   every 4 hours is not relieving pain.  If having to take consistently,   remember to take stool softeners also prescribed.; What changed: reasons   to take this, additional instructions     CONTINUE taking these medications     docusate sodium 100 mg capsule; Commonly known as: Colace; Take 1   capsule (100 mg) by mouth 2 times a day for 10 doses.   DULoxetine 60 mg DR capsule; Commonly known as: Cymbalta   gabapentin 600 mg tablet; Commonly known as: Neurontin   GaviLyte-G 236-22.74-6.74 -5.86 gram solution; Generic drug:   polyethylene glycol-electrolytes; Please refer to the printed instructions   that were mailed to you.   levothyroxine 88 mcg tablet; Commonly known as: Synthroid, Levoxyl            Lab Results   Component Value Date    WBC 8.2 07/28/2024    HGB 9.1 (L) 07/28/2024    HCT 29.3 (L) 07/28/2024     (H) 07/28/2024    CHOL 241 (H) 08/23/2022    HDL 36.8 (A)  "08/23/2022    LDLDIRECT 163 (H) 08/23/2022    ALT 9 07/27/2024    AST 22 07/27/2024     (L) 07/28/2024    K 4.3 07/28/2024     07/28/2024    CREATININE 0.55 07/28/2024    BUN 10 07/28/2024    CO2 25 07/28/2024    TSH 19.58 (H) 07/19/2024    INR 1.0 07/19/2024    HGBA1C 5.6 04/25/2024     Physical exam:  /79   Pulse 101   Temp 36.9 °C (98.4 °F)   Ht 1.651 m (5' 5\")   Wt 54.9 kg (121 lb)   SpO2 94%   BMI 20.14 kg/m²    Neck exam showed no mass, lymphadenopathy, or thyroid enlargement, and no carotid bruit.  Heart exam showed normal heart sounds, no murmur, clicks, gallops or rubs. Regular rate and rhythm. Chest is clear; no wheezes or rales.  Abdomen soft. Colostomy bag is in place, with tissue pinkish and health. Small amount of soft stool in bag.   No calf pain or edema    Assessments/orders:   1. Hospital discharge follow-up        2. Acquired hypothyroidism  levothyroxine (Synthroid, Levoxyl) 100 mcg tablet      3. Colostomy malfunction (Multi)        4. Large bowel obstruction (Multi)                         "

## 2024-08-03 ENCOUNTER — HOSPITAL ENCOUNTER (EMERGENCY)
Facility: HOSPITAL | Age: 64
Discharge: HOME | End: 2024-08-04
Attending: STUDENT IN AN ORGANIZED HEALTH CARE EDUCATION/TRAINING PROGRAM
Payer: MEDICARE

## 2024-08-03 ENCOUNTER — APPOINTMENT (OUTPATIENT)
Dept: RADIOLOGY | Facility: HOSPITAL | Age: 64
End: 2024-08-03
Payer: MEDICARE

## 2024-08-03 DIAGNOSIS — Z71.89 ENCOUNTER FOR OSTOMY CARE EDUCATION: Primary | ICD-10-CM

## 2024-08-03 LAB
ALBUMIN SERPL BCP-MCNC: 3.6 G/DL (ref 3.4–5)
ALP SERPL-CCNC: 75 U/L (ref 33–136)
ALT SERPL W P-5'-P-CCNC: 9 U/L (ref 7–45)
ANION GAP SERPL CALC-SCNC: 11 MMOL/L (ref 10–20)
AST SERPL W P-5'-P-CCNC: 16 U/L (ref 9–39)
BASOPHILS # BLD AUTO: 0.18 X10*3/UL (ref 0–0.1)
BASOPHILS NFR BLD AUTO: 1.7 %
BILIRUB SERPL-MCNC: 0.1 MG/DL (ref 0–1.2)
BUN SERPL-MCNC: 22 MG/DL (ref 6–23)
CALCIUM SERPL-MCNC: 8.5 MG/DL (ref 8.6–10.3)
CHLORIDE SERPL-SCNC: 104 MMOL/L (ref 98–107)
CO2 SERPL-SCNC: 22 MMOL/L (ref 21–32)
CREAT SERPL-MCNC: 0.79 MG/DL (ref 0.5–1.05)
EGFRCR SERPLBLD CKD-EPI 2021: 83 ML/MIN/1.73M*2
EOSINOPHIL # BLD AUTO: 0.62 X10*3/UL (ref 0–0.7)
EOSINOPHIL NFR BLD AUTO: 5.8 %
ERYTHROCYTE [DISTWIDTH] IN BLOOD BY AUTOMATED COUNT: 15.4 % (ref 11.5–14.5)
GLUCOSE SERPL-MCNC: 84 MG/DL (ref 74–99)
HCT VFR BLD AUTO: 27.9 % (ref 36–46)
HGB BLD-MCNC: 8.5 G/DL (ref 12–16)
IMM GRANULOCYTES # BLD AUTO: 0.07 X10*3/UL (ref 0–0.7)
IMM GRANULOCYTES NFR BLD AUTO: 0.7 % (ref 0–0.9)
LACTATE SERPL-SCNC: 0.6 MMOL/L (ref 0.4–2)
LIPASE SERPL-CCNC: 82 U/L (ref 9–82)
LYMPHOCYTES # BLD AUTO: 3.06 X10*3/UL (ref 1.2–4.8)
LYMPHOCYTES NFR BLD AUTO: 28.8 %
MCH RBC QN AUTO: 28 PG (ref 26–34)
MCHC RBC AUTO-ENTMCNC: 30.5 G/DL (ref 32–36)
MCV RBC AUTO: 92 FL (ref 80–100)
MONOCYTES # BLD AUTO: 0.77 X10*3/UL (ref 0.1–1)
MONOCYTES NFR BLD AUTO: 7.2 %
NEUTROPHILS # BLD AUTO: 5.93 X10*3/UL (ref 1.2–7.7)
NEUTROPHILS NFR BLD AUTO: 55.8 %
NRBC BLD-RTO: 0 /100 WBCS (ref 0–0)
PLATELET # BLD AUTO: 643 X10*3/UL (ref 150–450)
POTASSIUM SERPL-SCNC: 3.7 MMOL/L (ref 3.5–5.3)
PROT SERPL-MCNC: 6.6 G/DL (ref 6.4–8.2)
RBC # BLD AUTO: 3.04 X10*6/UL (ref 4–5.2)
SODIUM SERPL-SCNC: 133 MMOL/L (ref 136–145)
WBC # BLD AUTO: 10.6 X10*3/UL (ref 4.4–11.3)

## 2024-08-03 PROCEDURE — 96374 THER/PROPH/DIAG INJ IV PUSH: CPT | Mod: 59

## 2024-08-03 PROCEDURE — 80053 COMPREHEN METABOLIC PANEL: CPT | Performed by: STUDENT IN AN ORGANIZED HEALTH CARE EDUCATION/TRAINING PROGRAM

## 2024-08-03 PROCEDURE — 96361 HYDRATE IV INFUSION ADD-ON: CPT

## 2024-08-03 PROCEDURE — 36415 COLL VENOUS BLD VENIPUNCTURE: CPT | Performed by: STUDENT IN AN ORGANIZED HEALTH CARE EDUCATION/TRAINING PROGRAM

## 2024-08-03 PROCEDURE — 2500000004 HC RX 250 GENERAL PHARMACY W/ HCPCS (ALT 636 FOR OP/ED): Performed by: STUDENT IN AN ORGANIZED HEALTH CARE EDUCATION/TRAINING PROGRAM

## 2024-08-03 PROCEDURE — 83690 ASSAY OF LIPASE: CPT | Performed by: STUDENT IN AN ORGANIZED HEALTH CARE EDUCATION/TRAINING PROGRAM

## 2024-08-03 PROCEDURE — 85025 COMPLETE CBC W/AUTO DIFF WBC: CPT | Performed by: STUDENT IN AN ORGANIZED HEALTH CARE EDUCATION/TRAINING PROGRAM

## 2024-08-03 PROCEDURE — 74177 CT ABD & PELVIS W/CONTRAST: CPT

## 2024-08-03 PROCEDURE — 83605 ASSAY OF LACTIC ACID: CPT | Performed by: STUDENT IN AN ORGANIZED HEALTH CARE EDUCATION/TRAINING PROGRAM

## 2024-08-03 PROCEDURE — 99284 EMERGENCY DEPT VISIT MOD MDM: CPT | Mod: 25

## 2024-08-03 RX ORDER — MORPHINE SULFATE 4 MG/ML
4 INJECTION INTRAVENOUS ONCE
Status: COMPLETED | OUTPATIENT
Start: 2024-08-03 | End: 2024-08-03

## 2024-08-03 ASSESSMENT — PAIN DESCRIPTION - LOCATION: LOCATION: ABDOMEN

## 2024-08-03 ASSESSMENT — PAIN - FUNCTIONAL ASSESSMENT: PAIN_FUNCTIONAL_ASSESSMENT: 0-10

## 2024-08-03 ASSESSMENT — PAIN SCALES - GENERAL: PAINLEVEL_OUTOF10: 6

## 2024-08-03 NOTE — ED TRIAGE NOTES
Pt states she had Ostomy placed 12 days ago. Pt states ostomy seems more swollen and looks like it has a blister on it. Pt with pain at ostomy site. Pt states she was instructed to come in if any of these things occurred.

## 2024-08-04 VITALS
RESPIRATION RATE: 16 BRPM | DIASTOLIC BLOOD PRESSURE: 89 MMHG | HEART RATE: 70 BPM | BODY MASS INDEX: 20 KG/M2 | TEMPERATURE: 98.2 F | WEIGHT: 124.45 LBS | OXYGEN SATURATION: 97 % | HEIGHT: 66 IN | SYSTOLIC BLOOD PRESSURE: 101 MMHG

## 2024-08-04 PROCEDURE — 74177 CT ABD & PELVIS W/CONTRAST: CPT | Performed by: STUDENT IN AN ORGANIZED HEALTH CARE EDUCATION/TRAINING PROGRAM

## 2024-08-04 PROCEDURE — 2550000001 HC RX 255 CONTRASTS: Performed by: STUDENT IN AN ORGANIZED HEALTH CARE EDUCATION/TRAINING PROGRAM

## 2024-08-04 ASSESSMENT — PAIN - FUNCTIONAL ASSESSMENT: PAIN_FUNCTIONAL_ASSESSMENT: 0-10

## 2024-08-04 ASSESSMENT — PAIN SCALES - GENERAL: PAINLEVEL_OUTOF10: 0 - NO PAIN

## 2024-08-04 NOTE — ED PROVIDER NOTES
HPI   Chief Complaint   Patient presents with    Post-op Problem     Pt states she had Ostomy placed 12 days ago. Pt states ostomy seems more swollen and looks like it has a blister on it. Pt with pain at ostomy site. Pt states she was instructed to come in if any of these things occurred.        64-year-old female with past medical history of anxiety, hypothyroidism, recent ostomy presents ED with concerns for abdominal pain.  On July 18 patient had a large bowel obstruction which required a ostomy placed.  She was discharged from the hospital however came back 2 days later because she had worsening abdominal pain and swelling at the ostomy site.  She was again admitted at that time and it was determined that patient did not have an actual ostomy prolapse however just had swelling of the ostomy itself.  She was started on constipation medications and was sent home.  Patient coming in because she is starting to have worsening abdominal pain again.  Also feels like her ostomy site is more swollen.  No vomiting or nausea.  Patient is been compliant with medications.  No fevers or chills.  Still producing stool through ostomy site.              Patient History   Past Medical History:   Diagnosis Date    Anxiety and depression     Hypothyroidism, unspecified     Ileus, unspecified (Multi)     SBO (small bowel obstruction) (Multi) 07/18/2024     Past Surgical History:   Procedure Laterality Date    APPENDECTOMY      CHOLECYSTECTOMY      HYSTERECTOMY      x2     Family History   Problem Relation Name Age of Onset    No Known Problems Mother      Heart attack Father      Aneurysm Sister       Social History     Tobacco Use    Smoking status: Every Day     Current packs/day: 0.50     Types: Cigarettes    Smokeless tobacco: Never   Substance Use Topics    Alcohol use: Yes     Comment: 1 A MONTH    Drug use: Never       Physical Exam   ED Triage Vitals   Temperature Heart Rate Respirations BP   08/03/24 1913 08/03/24 1913  08/03/24 1913 08/03/24 1913   36.8 °C (98.2 °F) 95 18 115/53      Pulse Ox Temp Source Heart Rate Source Patient Position   08/03/24 1913 08/03/24 1913 08/03/24 2131 08/03/24 1913   95 % Temporal Monitor Sitting      BP Location FiO2 (%)     08/03/24 1913 --     Left arm        Physical Exam  Vitals and nursing note reviewed.   Constitutional:       General: She is not in acute distress.     Appearance: She is well-developed.   HENT:      Head: Normocephalic and atraumatic.   Eyes:      Conjunctiva/sclera: Conjunctivae normal.   Cardiovascular:      Rate and Rhythm: Normal rate and regular rhythm.      Heart sounds: No murmur heard.  Pulmonary:      Effort: Pulmonary effort is normal. No respiratory distress.      Breath sounds: Normal breath sounds.   Abdominal:      Palpations: Abdomen is soft.      Tenderness: There is abdominal tenderness.      Comments: Generalized abdominal tenderness with no rebound or guarding.  There is an ostomy to the mid abdomen with mild swelling to the ostomy site.  No blood noted.   Musculoskeletal:         General: No swelling.      Cervical back: Neck supple.   Skin:     General: Skin is warm and dry.      Capillary Refill: Capillary refill takes less than 2 seconds.   Neurological:      Mental Status: She is alert.   Psychiatric:         Mood and Affect: Mood normal.           ED Course & MDM                        [place] Coma Scale Score: 15                        Medical Decision Making  HISTORIAN:  Patient    CHART REVIEW:  Patient admitted in July 18 for large bowel obstruction and required ostomy.  Was again admitted on July 25 for concerns for possible ostomy herniation however was determined that it was just swelling in the ostomy with no herniation.    PT SUMMARY:  64-year-old female presents ED with abdominal pain concerns for swelling of ostomy.  Vital signs stable.    DDX:  Ostomy swelling, ostomy herniation, bowel obstruction, intra-abdominal abscess    PLAN:  Obtain  CBC, CMP, lipase, CT abdomen pelvis    DISPO/RE-EVAL:  Labs at this point show no acute abnormalities.  This time patient signed out to oncoming physician awaiting CT imaging.  CT imaging shows no acute abnormalities and his symptoms are likely just due to swelling of her ostomy.  Recommend discharge with follow-up from her surgeon as an outpatient.          Procedure  Procedures     Topher Malcolm,   08/04/24 0003

## 2024-08-04 NOTE — PROGRESS NOTES
I have accept care of this patient in signout.    In summary:  I received this patient in signout in summary this is a 64-year-old female who had large bowel obstruction on July 18 which required ostomy placed she was discharged 2 days later she returned for worsening abdominal pain and swelling of the ostomy site at that time there is concern for an ostomy prolapse but x-ray showed swelling of the ostomy itself and she was ultimately sent home.  Patient is concerned that this more swollen again.  Patient's lab work is reassuring.  CT of her abdomen looks similar to her prior suspect this is just continue to be ostomy swelling as opposed to prolapse.  Patient is able to be discharged and will follow-up with her surgeon.      Labs Reviewed   COMPREHENSIVE METABOLIC PANEL - Abnormal       Result Value    Glucose 84      Sodium 133 (*)     Potassium 3.7      Chloride 104      Bicarbonate 22      Anion Gap 11      Urea Nitrogen 22      Creatinine 0.79      eGFR 83      Calcium 8.5 (*)     Albumin 3.6      Alkaline Phosphatase 75      Total Protein 6.6      AST 16      Bilirubin, Total 0.1      ALT 9     CBC WITH AUTO DIFFERENTIAL - Abnormal    WBC 10.6      nRBC 0.0      RBC 3.04 (*)     Hemoglobin 8.5 (*)     Hematocrit 27.9 (*)     MCV 92      MCH 28.0      MCHC 30.5 (*)     RDW 15.4 (*)     Platelets 643 (*)     Neutrophils % 55.8      Immature Granulocytes %, Automated 0.7      Lymphocytes % 28.8      Monocytes % 7.2      Eosinophils % 5.8      Basophils % 1.7      Neutrophils Absolute 5.93      Immature Granulocytes Absolute, Automated 0.07      Lymphocytes Absolute 3.06      Monocytes Absolute 0.77      Eosinophils Absolute 0.62      Basophils Absolute 0.18 (*)    LIPASE - Normal    Lipase 82      Narrative:     Venipuncture immediately after or during the administration of Metamizole may lead to falsely low results. Testing should be performed immediately prior to Metamizole dosing.   LACTATE - Normal    Lactate  [facility]   PRE OP HISTORY AND PHYSICAL        FELLOW PHYSICIAN:  Ernesto Olivarez MD    ATTENDING PHYSICIAN: MD Belia    HISTORY OF PRESENT ILLNESS:  Here for battery exchange for SNS. Will check impedence as well.     MEDICATIONS PRIOR TO ADMISSION:    Current Facility-Administered Medications   Medication   • lactated ringers infusion   • acetaminophen (TYLENOL) tablet 1,000 mg   • sodium chloride 0.9 % flush bag 25 mL   • sodium chloride (PF) 0.9 % injection 2 mL   • vancomycin 1,500 mg in sodium chloride 0.9% 250 mL IVPB         ALLERGIES:  ALLERGIES:  No Known Allergies    PAST MEDICAL HISTORY:    Past Medical History:   Diagnosis Date   • Chronic pain    • Fecal incontinence    • Hemorrhoids    • High cholesterol    • Otitis media        SURGICAL HISTORY:    Past Surgical History:   Procedure Laterality Date   • Back surgery     • Colonoscopy     • Egd     • Sacral nerve stimulator placement     • Tonsillectomy         FAMILY HISTORY:  History reviewed. No pertinent family history.      SOCIAL HISTORY:    Social History     Tobacco Use   • Smoking status: Never Smoker   • Smokeless tobacco: Never Used   Substance Use Topics   • Alcohol use: Yes     Comment: some times   • Drug use: Never       REVIEW OF SYSTEMS:    Constitutional:  Patient denies fever, chills, tiredness or malaise.    Eyes:  Denies change in visual acuity, pain, burning or itching.    Immunologic:  Denies hives, seasonal allergies.    HENT:  Denies sinus problems, ear infections, nasal congestion or sore throat.    Respiratory:  Denies cough, shortness of breath.    Cardiovascular:  Denies chest pain, edema.    Gastrointestinal:  Denies abdominal pain, nausea, vomiting, bloody stools or diarrhea.    Genitourinary:  Denies urine retention, painful urination, urinary frequency, blood in urine or nocturia.    Musculoskeletal:  Denies back pain, neck pain, joint pain or leg swelling.    Integument:  Denies rash, itching.     Neurologic:  Denies headache, focal weakness or sensory changes.    Endocrine:  Denies polyuria, polydipsia or temperature intolerance.    Lymphatic:  Denies swollen glands, weight loss.    Psychiatric:  Denies anxiety, depression, hallucinations, irritability or sleeping problems.      OBJECTIVE:    VITAL SIGNS:       PHYSICAL EXAMINATION:  Constitutional:  The patient is alert, oriented and cooperative.   Integument:  Warm. Dry. No erythema. No rash.    HENT:  Normocephalic. Atraumatic. Bilateral external ears normal.   Neck: Normal range of motion. No tenderness. Supple. No stridor.    Cardiovascular:  Normal heart rate. Normal rhythm. No murmurs. No rubs. No gallops.    Respiratory:  Normal breath sounds. No respiratory distress. No wheezing. No chest tenderness.  Abd: soft, non tender, and non distended.   Neuro: AO x 3   Psych: appropriate mood and affect.       ASSESSMENT/PLAN:    Here for battery exchange for SNS. Will check impedence as well.     Proceed to OR      Ernesto Olivarez MD  PGY6, MIS Colorectal Fellow       0.6      Narrative:     Venipuncture immediately after or during the administration of Metamizole may lead to falsely low results. Testing should be performed immediately  prior to Metamizole dosing.     CT abdomen pelvis w IV contrast   Final Result   Findings are similar to 07/27/2024 CT abdomen and pelvis. There is   redemonstration of colitis involving the transverse colon with a   transverse loop colostomy which is also inflamed.        No other acute findings in the abdomen pelvis.        MACRO:   None        Signed by: Willi Rees 8/4/2024 1:16 AM   Dictation workstation:   [id number]

## 2024-08-05 ENCOUNTER — HOSPITAL ENCOUNTER (EMERGENCY)
Facility: HOSPITAL | Age: 64
Discharge: HOME | End: 2024-08-05
Payer: MEDICARE

## 2024-08-05 VITALS
HEART RATE: 90 BPM | DIASTOLIC BLOOD PRESSURE: 121 MMHG | OXYGEN SATURATION: 100 % | BODY MASS INDEX: 21.99 KG/M2 | TEMPERATURE: 98.7 F | WEIGHT: 132 LBS | RESPIRATION RATE: 18 BRPM | HEIGHT: 65 IN | SYSTOLIC BLOOD PRESSURE: 159 MMHG

## 2024-08-05 DIAGNOSIS — Z71.89 ENCOUNTER FOR OSTOMY CARE EDUCATION: Primary | ICD-10-CM

## 2024-08-05 PROCEDURE — 99281 EMR DPT VST MAYX REQ PHY/QHP: CPT

## 2024-08-05 ASSESSMENT — PAIN - FUNCTIONAL ASSESSMENT: PAIN_FUNCTIONAL_ASSESSMENT: 0-10

## 2024-08-05 ASSESSMENT — COLUMBIA-SUICIDE SEVERITY RATING SCALE - C-SSRS
6. HAVE YOU EVER DONE ANYTHING, STARTED TO DO ANYTHING, OR PREPARED TO DO ANYTHING TO END YOUR LIFE?: NO
1. IN THE PAST MONTH, HAVE YOU WISHED YOU WERE DEAD OR WISHED YOU COULD GO TO SLEEP AND NOT WAKE UP?: NO
2. HAVE YOU ACTUALLY HAD ANY THOUGHTS OF KILLING YOURSELF?: NO

## 2024-08-05 ASSESSMENT — PAIN SCALES - GENERAL: PAINLEVEL_OUTOF10: 0 - NO PAIN

## 2024-08-05 NOTE — ED PROVIDER NOTES
EMERGENCY MEDICINE EVALUATION NOTE    History of Present Illness     Chief Complaint:   Chief Complaint   Patient presents with    Needs New Ostomy Bag       HPI: Barbara Sow is a 64 y.o. female presents with a chief complaint of needing ostomy bag supplies.  Patient reports that she came in today because she does not have the proper supplies at home to take care of her ostomy.  She reports that she ran out of bags and also ran out of the dressing used to apply and stick to the bandage to her abdomen.  Patient reports that she is not having any pain and she was seen here recently for abdominal pain.  Patient states there is still good output out of the ostomy.  Patient denies any fevers or chills.  She does note that she has a little bit of irritation surrounding the ostomy site and she would like to have some barrier cream to treat this as well.    Previous History     Past Medical History:   Diagnosis Date    Anxiety and depression     Hypothyroidism, unspecified     Ileus, unspecified (Multi)     SBO (small bowel obstruction) (Multi) 07/18/2024     Past Surgical History:   Procedure Laterality Date    APPENDECTOMY      CHOLECYSTECTOMY      HYSTERECTOMY      x2     Social History     Tobacco Use    Smoking status: Every Day     Current packs/day: 0.50     Types: Cigarettes    Smokeless tobacco: Never   Substance Use Topics    Alcohol use: Yes     Comment: 1 A MONTH    Drug use: Never     Family History   Problem Relation Name Age of Onset    No Known Problems Mother      Heart attack Father      Aneurysm Sister       Allergies   Allergen Reactions    Cefaclor Hives    Cefuroxime Axetil Unknown    Ketorolac Unknown     Current Outpatient Medications   Medication Instructions    DULoxetine (CYMBALTA) 60 mg, oral, Daily, Do not crush or chew.    gabapentin (Neurontin) 600 mg tablet Take 2 tabs in the morning and 3 tabs at night.    levothyroxine (SYNTHROID, LEVOXYL) 100 mcg, oral, Daily, Except on Sundays  "take 2 tabs    oxyCODONE (ROXICODONE) 5 mg, oral, Every 6 hours PRN, Only take if regimen of tylenol and motrin every 4 hours is not relieving pain.  If having to take consistently, remember to take stool softeners also prescribed.    polyethylene glycol (Glycolax, Miralax) 17 gram/dose powder Take 17 g by mouth once daily. Do not fill before July 30, 2024.    polyethylene glycol-electrolytes (GaviLyte-G) 420 gram solution Please refer to the printed instructions that were mailed to you.       Physical Exam     Appearance: Alert, oriented , cooperative     Skin: Intact,  dry skin, no lesions, rash, petechiae or purpura.  Slight irritation of skin around ostomy site.     Eyes: PERRLA, EOMs intact,  Conjunctiva pink      ENT: Hearing grossly intact. Pharynx clear     Neck: Supple. Trachea at midline.      Pulmonary: Clear bilaterally. No rales, rhonchi or wheezing. No accessory muscle use or stridor.     Cardiac: Normal rate and rhythm without murmur     Abdomen: Soft, nontender, active bowel sounds.  Patient has ostomy site in mid abdomen that she currently has towel over that appears to have some minimal stool drainage from.     Musculoskeletal: Full range of motion. no pain, edema, or deformity.      Neurological:Cranial nerves II through XII are grossly intact, normal sensation, no weakness, no focal findings identified.     Results   Labs Reviewed - No data to display  No orders to display         ED Course & Medical Decision Making   Medications - No data to display  Heart Rate:  [90]   Temperature:  [37.1 °C (98.7 °F)]   Respirations:  [18]   BP: (159)/(121)   Height:  [165.1 cm (5' 5\")]   Weight:  [59.9 kg (132 lb)]   Pulse Ox:  [100 %]    ED Course as of 08/05/24 1755   Mon Aug 05, 2024   1750 Patient seen and evaluated in the ER today.  Patient comes in today complaining of not having supplies to treat her ostomy.  Patient was seen recently for abdominal pain but she denies any abdominal pain today.  She " says she just ran out of supplies and does not have anything to stick the bag to her abdomen. Plan of care discussed going forward. Patient says she just wants supplies and that she would like to be discharged.  Patient also inquired about a barrier cream to treat skin around the ostomy as it is starting to become irritated.  Nursing staff was able to get her supplies from the store room.  At this time patient be discharged home to follow-up as an outpatient.  She once again was encouraged return here immediately with any worsening symptoms. [CJ]      ED Course User Index  [CJ] Stephen Buitrago PA-C         Diagnoses as of 08/05/24 7212   Encounter for ostomy care education       Procedures   Procedures    Diagnosis     1. Encounter for ostomy care education        Disposition   Discharged    ED Prescriptions    None         Disclaimer: This note was dictated by speech recognition. Minor errors in transcription may be present. Please call if questions.       Stephen Buitrago PA-C  08/05/24 [id number]

## 2024-08-06 ENCOUNTER — HOME CARE VISIT (OUTPATIENT)
Dept: HOME HEALTH SERVICES | Facility: HOME HEALTH | Age: 64
End: 2024-08-06
Payer: MEDICARE

## 2024-08-06 VITALS
HEART RATE: 74 BPM | HEIGHT: 65 IN | DIASTOLIC BLOOD PRESSURE: 62 MMHG | TEMPERATURE: 98.1 F | BODY MASS INDEX: 21.99 KG/M2 | WEIGHT: 132 LBS | RESPIRATION RATE: 16 BRPM | OXYGEN SATURATION: 96 % | SYSTOLIC BLOOD PRESSURE: 108 MMHG

## 2024-08-06 PROCEDURE — G0299 HHS/HOSPICE OF RN EA 15 MIN: HCPCS | Mod: HHH

## 2024-08-06 PROCEDURE — 169592 NO-PAY CLAIM PROCEDURE

## 2024-08-06 ASSESSMENT — ENCOUNTER SYMPTOMS
STOOL FREQUENCY: DAILY
ABDOMINAL PAIN: 1
DENIES PAIN: 1
BOWEL PATTERN NORMAL: 1
APPETITE LEVEL: FAIR

## 2024-08-06 ASSESSMENT — ACTIVITIES OF DAILY LIVING (ADL)
ENTERING_EXITING_HOME: SUPERVISION
OASIS_M1830: 03

## 2024-08-06 ASSESSMENT — LIFESTYLE VARIABLES: SMOKING_STATUS: 1

## 2024-08-08 ENCOUNTER — HOME CARE VISIT (OUTPATIENT)
Dept: HOME HEALTH SERVICES | Facility: HOME HEALTH | Age: 64
End: 2024-08-08
Payer: MEDICARE

## 2024-08-08 VITALS
SYSTOLIC BLOOD PRESSURE: 100 MMHG | RESPIRATION RATE: 16 BRPM | TEMPERATURE: 97.8 F | HEART RATE: 78 BPM | DIASTOLIC BLOOD PRESSURE: 70 MMHG

## 2024-08-08 PROCEDURE — G0299 HHS/HOSPICE OF RN EA 15 MIN: HCPCS | Mod: HHH

## 2024-08-08 ASSESSMENT — ENCOUNTER SYMPTOMS
LOWEST PAIN SEVERITY IN PAST 24 HOURS: 2/10
PAIN: 1
PERSON REPORTING PAIN: PATIENT
APPETITE LEVEL: FAIR
HIGHEST PAIN SEVERITY IN PAST 24 HOURS: 5/10
PAIN LOCATION: ABDOMEN

## 2024-08-08 NOTE — HOME HEALTH
[facility] home  nursing visit     stoma type: loop colostomy 7/19/2024 with dr. Sharee Freeman due to obstruction.   size: 57  stoma does mushroom.   location: mid abd   protrustion: yes   mucocutaneous junction: seperation at 12 oclock.   mucosal condition and color: red and moist   Peristomal Skin: a few denuded area but they stated much improved.     Peristomal contour: outward.   character of output: liquid today,     pouching system used : 1 piece soft convex mirella   accessories used: ring around, paste at 6 oclock and in umbilicus.  powder , skin barrier film. remover  extenders.   will add belt.      was leaking upon arrival however seemed to be a hole in the floating flange itself not from the skin.   belly is ouward shapped from history of obstruction.     will sample flip pouches large enough to fit.   and accessories.    left 1 7300  left 3   8805  left [phone]  left 3 [phone]  soft covnex opaque

## 2024-08-08 NOTE — CASE COMMUNICATION
saw pateint for you.   she was leaking when i arrived  ther ewas a hole in the floating flange part, not at her skin left. like she put a fingernail through it.     put her in one piece soft convex today, with a ring. paste at 6 oclock and past in umbilicus.  added barrier extnders and belt.   will send appropirate sized items and if this pouch is successful will order them  gonna give her over the weekend.     i can return when the [facility] ples get there.

## 2024-08-09 ENCOUNTER — HOME CARE VISIT (OUTPATIENT)
Dept: HOME HEALTH SERVICES | Facility: HOME HEALTH | Age: 64
End: 2024-08-09
Payer: MEDICARE

## 2024-08-09 ENCOUNTER — APPOINTMENT (OUTPATIENT)
Dept: SURGERY | Facility: CLINIC | Age: 64
End: 2024-08-09
Payer: MEDICARE

## 2024-08-09 ENCOUNTER — PATIENT OUTREACH (OUTPATIENT)
Dept: CARE COORDINATION | Facility: CLINIC | Age: 64
End: 2024-08-09

## 2024-08-09 VITALS
OXYGEN SATURATION: 95 % | HEIGHT: 65 IN | HEART RATE: 71 BPM | WEIGHT: 121.8 LBS | DIASTOLIC BLOOD PRESSURE: 81 MMHG | BODY MASS INDEX: 20.29 KG/M2 | SYSTOLIC BLOOD PRESSURE: 149 MMHG

## 2024-08-09 DIAGNOSIS — K56.609 LARGE BOWEL OBSTRUCTION (MULTI): Primary | ICD-10-CM

## 2024-08-09 DIAGNOSIS — Z93.3 COLOSTOMY IN PLACE (MULTI): ICD-10-CM

## 2024-08-09 PROCEDURE — 1123F ACP DISCUSS/DSCN MKR DOCD: CPT | Performed by: SURGERY

## 2024-08-09 PROCEDURE — 99024 POSTOP FOLLOW-UP VISIT: CPT | Performed by: SURGERY

## 2024-08-09 PROCEDURE — 1111F DSCHRG MED/CURRENT MED MERGE: CPT | Performed by: SURGERY

## 2024-08-09 PROCEDURE — 1157F ADVNC CARE PLAN IN RCRD: CPT | Performed by: SURGERY

## 2024-08-09 PROCEDURE — 1159F MED LIST DOCD IN RCRD: CPT | Performed by: SURGERY

## 2024-08-09 PROCEDURE — 3008F BODY MASS INDEX DOCD: CPT | Performed by: SURGERY

## 2024-08-09 NOTE — PROGRESS NOTES
"Subjective   Patient ID: Barbara Sow is a 64 y.o. female who presents for Follow-up (Patient is here for a 2 week PO appointment Exploration Laparotomy, transverse colostomy on 7.19.24. Patient reports some pain and stomach discomfort. Patient denies leakage or concerns with going to the bathroom. ).    HPI   underwent a loop transverse colostomy on 19th July 2024 for a LBO.  Her postoperative course was significant for a readmission for bloody drainage from her ostomy, pain, and decreased output.  She has some mucosal bleeding from the surface of the colostomy due to manual irritation which subsided.  She did have some issues with constipation which resolved with some Golytely.  She is now having regular output from her colostomy - empties it about 3x a day.      Review of Systems  Constitutional: Denies changes in weight, fatigue, night-sweats  CV: No palpitations, no chest pain, no lower extremity oedema  Resp: No wheezing, no cough, no shortness of breath  GI: As per HPI  : No dysuria, no increased frequency  Neuro: No weakness, confusion, numbness, dizziness    Objective   /81   Pulse 71   Ht 1.651 m (5' 5\")   Wt 55.2 kg (121 lb 12.8 oz)   SpO2 95%   BMI 20.27 kg/m²   Physical Exam  Vitals reviewed.   Cardiovascular:      Rate and Rhythm: Normal rate.      Pulses: Normal pulses.   Abdominal:      Palpations: Abdomen is soft.      Comments: Distended  Ostomy oedema improving  Bag with some liquidy stool  Non-tender   Skin:     General: Skin is warm.      Capillary Refill: Capillary refill takes less than 2 seconds.   Neurological:      General: No focal deficit present.      Mental Status: She is alert.   Psychiatric:         Mood and Affect: Mood normal.         Assessment/Plan   64F with LBO with splenic flex stricture (unknown aetiology) s/p loop transverse colostomy doing expectedly.   - has been set up for scope with  on 15th August  - eventual bowel resection " and colostomy takedown will depend on underlying pathology from stricture  Will keep in contact with patient and  once scope is performed and pathology is resulted  Problem List Items Addressed This Visit    None  Visit Diagnoses         Codes    Large bowel obstruction (Multi)    -  Primary K56.609    Colostomy in place (Multi)     Z93.3                 Sharee Freeman MD 08/09/24 9:45 AM

## 2024-08-10 NOTE — CASE COMMUNICATION
"supplies ordered via cardinal to med connect to most likely [facility].     Add Item Cancel Cart  Click here to Delete Item  Image Not Available  Mouse over to zoom  NO STING BARRIER FILM CAVILON 3/4ML WIPES, ALCOHOL-FREE  In Stock  Sold by Box of 50  Item # : [id number]   : [employer]   Item # : [id number]  Estimated Qty Available ([facility]) : 27 Box  Order Type : MedConnect  Change Qty  Quantity : 1 Box  Click here to Delete Item   Image Not Available  Mouse over to zoom  BARRIER STRIP ELASTIC BRAVA C-SHAPED X-LRG STRIP  In Stock  Sold by Box of 30  Item # : [id number]   : [employer]   Item # : [id number]  Estimated Qty Available ([facility]) : 242 Box  Order Type : MedConnect  Change Qty  Quantity : 2 Box  Click here to Delete Item  Image Not Available  Mouse over to zoom  BARRIER PASTE 2 OZ STOMAHESIVE  In Stock  Item # : [id number]  Manufactu rer : [employer]   Item # : [id number]  Estimated Qty Available ([facility]) : 1,091 Each  Order Type : MedConnect  Change Qty  Quantity : 2 Each  Click here to Delete Item  Image Not Available  Mouse over to zoom  BELT OSTOMY MEDIUM 26 IN - 43 IN  In Stock  Item # : [id number]   : [employer]   Item # : [id number]  Estimated Qty Available ([facility]) : 203 Each  1 Box = 10 [place]  Order Type : MedConnect  Change Qty   Quantity : 1 Each  Click here to Delete Item  Image Not Available  Mouse over to zoom  ADHESIVE REMOVER WIPE  In Stock  Item # : [id number]   : [employer]   Item # : [id number]  Estimated Qty Available ([facility]) : 13,150 Each  1 Box = 50 [place]  Order Type : MedConnect  Change Qty  Quantity : 50 Each  Click here to Delete Item  Image Not Available  Mouse over to zoom  BARRIER RING STANDARD 4.5 MM, 2\" CERARING  In Stoc k  Item # : [id number]   : [employer]   Item # : [id number]  Estimated Qty Available ([facility]) : 6,717 Each  1 Box = 10 [place]  Order Type : MedConnect  Change " "Qty  Quantity : 20 Each  Click here to Delete Item  Image Not Available  Mouse over to zoom  GAUZE SPONGE NONSTERILE DERMACEA NON WOVEN 4 X 4 4PLY  In Stock  Sold by Pack of 200  Item # : [id number]   : [employer]   Item # : [id number]  Estimated Qty Available ([facility]) : 42 Pack  Order Type : MedConnect  Change Qty  Quantity : 1 Pack  Click here to Delete Item  Image Not Available  Mouse over to zoom  1 PC DRAINABLE-CONVEX CERA PLUS W FILTER CTF UP TO 2 1/8\"  In Stock  Sold by Box of 5  Item # : [id number]   : [employer]   Item # : [id number]  Estimated Qty Available ([facility]) : 13 Box  Order Type : MedConnect"

## 2024-08-10 NOTE — HOME HEALTH
convatec sapmles ordered.  [id number]  [id number]    [place]  [address] •  Premier™ One-Piece Drainable Ostomy Pouch – Soft Convex Flextend™ Barrier, Viewing Option, Lock 'n Roll™ Microseal Closure, Tape, Filter    remove item  7760 •  Adapt™ Universal Remover Wipes    remove item  7917 •  Adapt™ Skin Protective Wipes    remove item  7906 •  Adapt™ Stoma Powder    remove item  61805 •  CeraPlus™ Barrier Extenders    remove item  7300 •  Adapt™ Ostomy Belt    remove item  8805 •  CeraRing™ Barrier Rings    remove item  91173 •  Adapt™ Lubricating Deodorant      coloplast samples   SenSura [place] Concave 1-piece open  SenSura® [place] Convex Flip 1-piece drainable  All-in-one Convex Flip ileostomy pouching system specially designed to fit bulges, hernias and curves.  43290 - [facility]:  Maxi - Stoma size: 3/8 - 2 3/8in (10-60mm) - Full [place] filter Transparent      Change variant    Brava Elastic Tape XL  Brava® Elastic Barrier Strips - XL  By following body shape and movement, Brava® Elastic Barrier Strips XL keep the barrier secure. It also prevents the edges of the barrier from lifting and rolling.  30357 - [facility]:  - Elastic Barrier Strip XL - 6.7in      Change variant    SenSura Jaleel Convex Soft 1-piece open    SenSura® Jaleel Convex Soft 1-piece drainable pouch  All-in-one convex ileostomy pouch that fits securely to uneven skin areas, deep-seated areas and stomas that need help to protrude.  97146 - [facility]:  Maxi (11in / 655mL) - Stoma size: 10-50mm  3/8 - 2in - with filter, Transparent      Change variant    Brava Belt for SenSura Jaleel  Brava® Belt for SenSura Buck[place]  Discreet and comfortable belt solution for SenSura Jaleel.  4237 - [facility]:  - SenSura® Jaleel Belt - 40in      Change variant    Brava Adhesive Remover Wipe  Brava® Adhesive Remover Wipes  Sting-free and easy removal of your appliance  173816 - [facility]: ; Brava Adhesive remover wipe 30/bx      Change variant    Brava Skin Barrier Wipe  Brava® Skin  Barrier Wipes  Sting-free skin protection from output and adhesive.  107899 - [facility]:  - Skin barrier wipe 30/bx      Change variant    Brava Mouldable Ring  Brava® Moldable Ring  Creates a durable seal between the stoma and ostomy barrier to reduce leakage.  816976 - [facility] [place] [facility]:  - Moldable ring - 4.2mm      Change variant    Brava Powder  Brava® Powder  Keeps the skin dry and protects from skin irritation.  35241 - [facility]:  - Powder - 25g  1oz

## 2024-08-10 NOTE — HOME HEALTH
"supplies ordered via cardianl to med connect  Add Item Cancel Cart  Click here to Delete Item  Image Not Available  Mouse over to zoom  NO STING BARRIER FILM CAVILON 3/4ML WIPES, ALCOHOL-FREE  In Stock  Sold by Box of 50  Item # : [id number]   : 3M   Item # : [id number]  Estimated Qty Available ([facility]) : 27 Box  Order Type : MedConnect  Change Qty  Quantity : 1 Box  Click here to Delete Item  Image Not Available  Mouse over to zoom  BARRIER STRIP ELASTIC BRAVA C-SHAPED X-LRG STRIP  In Stock  Sold by Box of 30  Item # : [id number]   : [employer]   Item # : [id number]  Estimated Qty Available ([facility]) : 242 Box  Order Type : MedConnect  Change Qty  Quantity : 2 Box  Click here to Delete Item  Image Not Available  Mouse over to zoom  BARRIER PASTE 2 OZ STOMAHESIVE  In Stock  Item # : [id number]   : [employer]   Item # : [id number]  Estimated Qty Available ([facility]) : 1,091 Each  Order Type : MedConnect  Change Qty  Quantity : 2 Each  Click here to Delete Item  Image Not Available  Mouse over to zoom  BELT OSTOMY MEDIUM 26 IN - 43 IN  In Stock  Item # : [id number]   : [employer]   Item # : [id number]  Estimated Qty Available ([facility]) : 203 Each  1 Box = 10 [place]  Order Type : MedConnect  Change Qty  Quantity : 1 Each  Click here to Delete Item  Image Not Available  Mouse over to zoom  ADHESIVE REMOVER WIPE  In Stock  Item # : [id number]   : [employer]   Item # : [id number]  Estimated Qty Available ([facility]) : 13,150 Each  1 Box = 50 [place]  Order Type : MedConnect  Change Qty  Quantity : 50 Each  Click here to Delete Item  Image Not Available  Mouse over to zoom  BARRIER RING STANDARD 4.5 MM, 2\" CERARING  In Stock  Item # : [id number]   : [employer]   Item # : [id number]  Estimated Qty Available ([facility]) : 6,717 Each  1 Box = 10 [place]  Order Type : MedConnect  Change Qty  Quantity : 20 Each  Click here " "to Delete Item  Image Not Available  Mouse over to zoom  GAUZE SPONGE NONSTERILE DERMACEA NON WOVEN 4 X 4 4PLY  In Stock  Sold by Pack of 200  Item # : [id number]   : [employer]   Item # : [id number]  Estimated Qty Available ([facility]) : 42 Pack  Order Type : MedConnect  Change Qty  Quantity : 1 Pack  Click here to Delete Item  Image Not Available  Mouse over to zoom  1 PC DRAINABLE-CONVEX CERA PLUS W FILTER CTF UP TO 2 1/8\"  In Stock  Sold by Box of 5  Item # : [id number]   : [employer]   Item # : [id number]  Estimated Qty Available ([facility]) : 13 Box  Order Type : MedConnect"

## 2024-08-13 ENCOUNTER — HOME CARE VISIT (OUTPATIENT)
Dept: HOME HEALTH SERVICES | Facility: HOME HEALTH | Age: 64
End: 2024-08-13
Payer: MEDICARE

## 2024-08-13 VITALS
SYSTOLIC BLOOD PRESSURE: 118 MMHG | DIASTOLIC BLOOD PRESSURE: 62 MMHG | HEART RATE: 78 BPM | TEMPERATURE: 97.2 F | RESPIRATION RATE: 16 BRPM

## 2024-08-13 PROCEDURE — G0299 HHS/HOSPICE OF RN EA 15 MIN: HCPCS | Mod: HHH

## 2024-08-13 SDOH — ECONOMIC STABILITY: GENERAL

## 2024-08-13 ASSESSMENT — ENCOUNTER SYMPTOMS
BOWEL PATTERN NORMAL: 1
DENIES PAIN: 1
OCCASIONAL FEELINGS OF UNSTEADINESS: 0
APPETITE LEVEL: FAIR
ABDOMINAL PAIN: 1

## 2024-08-13 ASSESSMENT — ACTIVITIES OF DAILY LIVING (ADL): MONEY MANAGEMENT (EXPENSES/BILLS): INDEPENDENT

## 2024-08-15 ENCOUNTER — HOSPITAL ENCOUNTER (OUTPATIENT)
Dept: GASTROENTEROLOGY | Facility: HOSPITAL | Age: 64
Discharge: HOME | End: 2024-08-15
Payer: MEDICARE

## 2024-08-15 ENCOUNTER — ANESTHESIA (OUTPATIENT)
Dept: GASTROENTEROLOGY | Facility: HOSPITAL | Age: 64
End: 2024-08-15
Payer: MEDICARE

## 2024-08-15 ENCOUNTER — ANESTHESIA EVENT (OUTPATIENT)
Dept: GASTROENTEROLOGY | Facility: HOSPITAL | Age: 64
End: 2024-08-15
Payer: MEDICARE

## 2024-08-15 VITALS
WEIGHT: 125.66 LBS | OXYGEN SATURATION: 93 % | HEART RATE: 64 BPM | TEMPERATURE: 97.2 F | DIASTOLIC BLOOD PRESSURE: 70 MMHG | SYSTOLIC BLOOD PRESSURE: 137 MMHG | HEIGHT: 66 IN | RESPIRATION RATE: 16 BRPM | BODY MASS INDEX: 20.2 KG/M2

## 2024-08-15 DIAGNOSIS — K56.50: Primary | ICD-10-CM

## 2024-08-15 PROCEDURE — 7100000009 HC PHASE TWO TIME - INITIAL BASE CHARGE

## 2024-08-15 PROCEDURE — 2500000004 HC RX 250 GENERAL PHARMACY W/ HCPCS (ALT 636 FOR OP/ED): Performed by: COLON & RECTAL SURGERY

## 2024-08-15 PROCEDURE — 2500000004 HC RX 250 GENERAL PHARMACY W/ HCPCS (ALT 636 FOR OP/ED): Performed by: NURSE ANESTHETIST, CERTIFIED REGISTERED

## 2024-08-15 PROCEDURE — 3700000002 HC GENERAL ANESTHESIA TIME - EACH INCREMENTAL 1 MINUTE

## 2024-08-15 PROCEDURE — 45386 COLONOSCOPY W/BALLOON DILAT: CPT | Performed by: COLON & RECTAL SURGERY

## 2024-08-15 PROCEDURE — 3700000001 HC GENERAL ANESTHESIA TIME - INITIAL BASE CHARGE

## 2024-08-15 PROCEDURE — 45380 COLONOSCOPY AND BIOPSY: CPT | Performed by: COLON & RECTAL SURGERY

## 2024-08-15 PROCEDURE — 45381 COLONOSCOPY SUBMUCOUS NJX: CPT | Performed by: COLON & RECTAL SURGERY

## 2024-08-15 PROCEDURE — 7100000010 HC PHASE TWO TIME - EACH INCREMENTAL 1 MINUTE

## 2024-08-15 PROCEDURE — 2720000007 HC OR 272 NO HCPCS

## 2024-08-15 RX ORDER — SODIUM CHLORIDE, SODIUM LACTATE, POTASSIUM CHLORIDE, CALCIUM CHLORIDE 600; 310; 30; 20 MG/100ML; MG/100ML; MG/100ML; MG/100ML
20 INJECTION, SOLUTION INTRAVENOUS CONTINUOUS
Status: DISCONTINUED | OUTPATIENT
Start: 2024-08-15 | End: 2024-08-16 | Stop reason: HOSPADM

## 2024-08-15 RX ORDER — PROPOFOL 10 MG/ML
INJECTION, EMULSION INTRAVENOUS CONTINUOUS PRN
Status: DISCONTINUED | OUTPATIENT
Start: 2024-08-15 | End: 2024-08-15

## 2024-08-15 SDOH — HEALTH STABILITY: MENTAL HEALTH: CURRENT SMOKER: 1

## 2024-08-15 ASSESSMENT — PAIN SCALES - GENERAL
PAINLEVEL_OUTOF10: 0 - NO PAIN

## 2024-08-15 ASSESSMENT — ENCOUNTER SYMPTOMS
SORE THROAT: 0
DIZZINESS: 0
AGITATION: 0
JOINT SWELLING: 0
BACK PAIN: 0
WOUND: 0
ARTHRALGIAS: 0
HEADACHES: 0
LIGHT-HEADEDNESS: 0
FEVER: 0
DYSURIA: 0
ABDOMINAL PAIN: 0
ABDOMINAL DISTENTION: 0
CHILLS: 0
TROUBLE SWALLOWING: 0
COLOR CHANGE: 0
NAUSEA: 0
SHORTNESS OF BREATH: 0

## 2024-08-15 ASSESSMENT — PAIN - FUNCTIONAL ASSESSMENT
PAIN_FUNCTIONAL_ASSESSMENT: 0-10

## 2024-08-15 ASSESSMENT — COLUMBIA-SUICIDE SEVERITY RATING SCALE - C-SSRS
1. IN THE PAST MONTH, HAVE YOU WISHED YOU WERE DEAD OR WISHED YOU COULD GO TO SLEEP AND NOT WAKE UP?: NO
2. HAVE YOU ACTUALLY HAD ANY THOUGHTS OF KILLING YOURSELF?: NO
6. HAVE YOU EVER DONE ANYTHING, STARTED TO DO ANYTHING, OR PREPARED TO DO ANYTHING TO END YOUR LIFE?: NO

## 2024-08-15 NOTE — H&P
History Of Present Illness  Barbara Sow is a 64 y.o. female presenting with LBO s/o diverting transverse loop colostomy on 7/19/2024. Plan for colonoscopy today.     Past Medical History  Past Medical History:   Diagnosis Date    Anxiety and depression     Hypothyroidism, unspecified     Ileus, unspecified (Multi)     SBO (small bowel obstruction) (Multi) 07/18/2024     Surgical History  Past Surgical History:   Procedure Laterality Date    APPENDECTOMY      CHOLECYSTECTOMY      HYSTERECTOMY      x2    OTHER SURGICAL HISTORY  07/19/2024    Exploration Laparotomy, transverse colostomy     Social History  She reports that she has been smoking cigarettes. She has never used smokeless tobacco. She reports current alcohol use. She reports that she does not use drugs.    Family History  Family History   Problem Relation Name Age of Onset    No Known Problems Mother      Heart attack Father      Aneurysm Sister          Allergies  Allergies   Allergen Reactions    Cefaclor Hives    Cefuroxime Axetil Hives    Ketorolac Headache     Review of Systems   Constitutional:  Negative for chills and fever.   HENT:  Negative for congestion, sore throat and trouble swallowing.    Respiratory:  Negative for shortness of breath.    Cardiovascular:  Negative for chest pain.   Gastrointestinal:  Negative for abdominal distention, abdominal pain and nausea.   Genitourinary:  Negative for dyspareunia and dysuria.   Musculoskeletal:  Negative for arthralgias, back pain and joint swelling.   Skin:  Negative for color change and wound.   Neurological:  Negative for dizziness, light-headedness and headaches.   Psychiatric/Behavioral:  Negative for agitation and behavioral problems.         Physical Exam  Constitutional:       Appearance: Normal appearance.   HENT:      Head: Normocephalic and atraumatic.      Nose: No congestion.   Cardiovascular:      Rate and Rhythm: Normal rate and regular rhythm.   Pulmonary:      Effort: Pulmonary  effort is normal. No respiratory distress.   Abdominal:      General: Abdomen is flat.      Palpations: Abdomen is soft.      Comments: Colostomy in place   Musculoskeletal:         General: No deformity. Normal range of motion.      Cervical back: Normal range of motion and neck supple.   Skin:     General: Skin is warm.      Capillary Refill: Capillary refill takes less than 2 seconds.      Coloration: Skin is not jaundiced.   Neurological:      General: No focal deficit present.      Mental Status: She is alert. Mental status is at baseline.   Psychiatric:         Mood and Affect: Mood normal.         Behavior: Behavior normal.         Thought Content: Thought content normal.          Last Recorded Vitals  There were no vitals taken for this visit.    Assessment/Plan   Plan for colonoscopy to evaluate LBO.     Monae Saunders MD

## 2024-08-15 NOTE — ANESTHESIA PREPROCEDURE EVALUATION
Patient: Barbara Sow    Procedure Information       Date/Time: 08/15/24 1200    Scheduled providers: Ingrid Valencia MD; Lino Calvo MD; SILVIA Penn    Procedure: COLONOSCOPY    [facility]: [facility]            Relevant Problems   Neuro   (+) Anxiety   (+) Bilateral sciatica   (+) Right cervical radiculopathy   (+) Right lumbar radiculopathy      GI   (+) Lower GI bleed      Endocrine   (+) Hypothyroidism      Musculoskeletal   (+) Lumbar degenerative disc disease   (+) OA (osteoarthritis) of knee   (+) Osteoarthritis cervical spine   (+) Osteoarthritis of finger      HEENT   (+) Acute sinusitis      ID   (+) Cold sore       Clinical information reviewed:   Tobacco  Allergies  Meds   Med Hx  Surg Hx  OB Status  Fam Hx  Soc   Hx         Past Medical History:   Diagnosis Date    Anxiety and depression     Hypothyroidism, unspecified     Ileus, unspecified (Multi)     SBO (small bowel obstruction) (Multi) 07/18/2024      Past Surgical History:   Procedure Laterality Date    APPENDECTOMY      CHOLECYSTECTOMY      HYSTERECTOMY      OTHER SURGICAL HISTORY  07/19/2024    Exploration Laparotomy, transverse colostomy     Social History     Tobacco Use    Smoking status: Every Day     Current packs/day: 2.00     Types: Cigarettes    Smokeless tobacco: Never   Vaping Use    Vaping status: Never Used   Substance Use Topics    Alcohol use: Yes     Comment: 1 A MONTH    Drug use: Never      Current Outpatient Medications   Medication Instructions    acetaminophen (TYLENOL) 325 mg, oral, 4 times daily PRN    DULoxetine (Cymbalta) 60 mg DR capsule 1 capsule, oral, Daily, Do not crush or chew.    gabapentin (Neurontin) 600 mg tablet Take 2 tabs in the morning and 3 tabs at night.    levothyroxine (SYNTHROID, LEVOXYL) 100 mcg, oral, Daily, Except on Sundays take 2 tabs    oxyCODONE (ROXICODONE) 5 mg, oral, Every 6 hours PRN, Only take if regimen of tylenol and motrin every 4 hours is not  "relieving pain.  If having to take consistently, remember to take stool softeners also prescribed.    polyethylene glycol (Glycolax, Miralax) 17 gram/dose powder Take 17 g by mouth once daily. Do not fill before July 30, 2024.    polyethylene glycol-electrolytes (GaviLyte-G) 420 gram solution Please refer to the printed instructions that were mailed to you.      Allergies   Allergen Reactions    Cefaclor Hives    Cefuroxime Axetil Hives    Ketorolac Headache        Chemistry    Lab Results   Component Value Date/Time     (L) 08/03/2024 2214    K 3.7 08/03/2024 2214     08/03/2024 2214    CO2 22 08/03/2024 2214    BUN 22 08/03/2024 2214    CREATININE 0.79 08/03/2024 2214    Lab Results   Component Value Date/Time    CALCIUM 8.5 (L) 08/03/2024 2214    ALKPHOS 75 08/03/2024 2214    AST 16 08/03/2024 2214    ALT 9 08/03/2024 2214    BILITOT 0.1 08/03/2024 2214          Lab Results   Component Value Date    HGBA1C 5.6 04/25/2024     Lab Results   Component Value Date/Time    WBC 10.6 08/03/2024 2214    HGB 8.5 (L) 08/03/2024 2214    HCT 27.9 (L) 08/03/2024 2214     (H) 08/03/2024 2214     Lab Results   Component Value Date/Time    PROTIME 11.6 07/19/2024 1114    INR 1.0 07/19/2024 1114     No results found for: \"ABORH\"  Encounter Date: 07/18/24   ECG 12 Lead   Result Value    Ventricular Rate 55    Atrial Rate 55    MN Interval 158    QRS Duration 94    QT Interval 488    QTC Calculation(Bazett) 466    P Axis 6    R Axis 29    T Axis 110    QRS Count 9    Q Onset 222    P Onset 143    P Offset 189    T Offset 466    QTC Fredericia 474    Narrative    Sinus bradycardia  Incomplete right bundle branch block  Anterior infarct , age undetermined  T wave abnormality, consider lateral ischemia  Abnormal ECG    Confirmed by Salina Smith ([id number]) on 7/19/2024 2:28:06 PM     No results found for this or any previous visit from the past 1095 days.       Visit Vitals  /71   Pulse 70   Temp 36.1 °C (97 °F) " "(Temporal)   Resp 18   Ht 1.676 m (5' 6\")   Wt 57 kg (125 lb 10.6 oz)   SpO2 97%   BMI 20.28 kg/m²   OB Status Hysterectomy   Smoking Status Every Day   BSA 1.63 m²     NPO/Void Status  Carbohydrate Drink Given Prior to Surgery? : N  Date of Last Liquid: 08/15/24  Time of Last Liquid: 0600  Date of Last Solid: 08/14/24  Time of Last Solid: 0800  Last Intake Type: Clear fluids  Time of Last Void: 1142         Physical Exam    Airway  Mallampati: II  TM distance: >3 FB  Neck ROM: full     Cardiovascular - normal exam     Dental - normal exam     Pulmonary - normal exam     Abdominal - normal exam              Anesthesia Plan    History of general anesthesia?: yes  History of complications of general anesthesia?: no    ASA 2     MAC     The patient is a current smoker.    intravenous induction   Anesthetic plan and risks discussed with patient.  Use of blood products discussed with patient who.    Plan discussed with CAA.        "

## 2024-08-15 NOTE — ANESTHESIA POSTPROCEDURE EVALUATION
Patient: Barbara Sow    Procedure Summary       Date: 08/15/24 Room / Location: [facility]    Anesthesia Start: 1244 Anesthesia Stop: 1352    Procedure: COLONOSCOPY Diagnosis:       Obstruction of large intestine due to peritoneal adhesions (Multi)      Obstruction of large intestine due to peritoneal adhesions (Multi)    Scheduled Providers: Ingrid Valencia MD; Lino Calvo MD; TERESO Penn-CRNA Responsible Provider: Lino Calvo MD    Anesthesia Type: MAC ASA Status: 2            Anesthesia Type: MAC    Vitals Value Taken Time   /70 08/15/24 1420   Temp 36.2 °C (97.2 °F) 08/15/24 1349   Pulse 61 08/15/24 1425   Resp 16 08/15/24 1419   SpO2 96 % 08/15/24 1425   Vitals shown include unfiled device data.    Anesthesia Post Evaluation    Patient location during evaluation: PACU  Patient participation: complete - patient participated  Level of consciousness: awake  Pain management: adequate  Airway patency: patent  Cardiovascular status: acceptable  Respiratory status: acceptable  Hydration status: acceptable  Postoperative Nausea and Vomiting: none        There were no known notable events for this encounter.

## 2024-08-15 NOTE — LETTER
[phone]      Dear Ms. Magi,       It was my pleasure to meet you at your recent colonoscopy.  At that time,  your examination showed no polyps.  The stricture was biopsied and was consistent with ischemia and thankfully not cancer.  We can plan on taking down the colostomy soon.  I would recommend repeat colonoscopy 1 year after surgery to ensure that things still look good.      Thank you very much for allowing me to take part in your care, please feel free to contact me with any questions or concerns at [phone].          Sincerely,       Ingrid Valencia M.D. FACS, FASCRS    CC:  Primary Care:

## 2024-08-15 NOTE — ANESTHESIA POSTPROCEDURE EVALUATION
Patient: Barbara Sow    Procedure Summary       Date: 08/15/24 Room / Location: [facility]    Anesthesia Start: 1244 Anesthesia Stop:     Procedure: COLONOSCOPY Diagnosis:       Obstruction of large intestine due to peritoneal adhesions (Multi)      Obstruction of large intestine due to peritoneal adhesions (Multi)    Scheduled Providers: Ingrid Valencia MD; Lino Calvo MD; TERESO Penn-CRNA Responsible Provider: Lino Calvo MD    Anesthesia Type: MAC ASA Status: 2            Anesthesia Type: MAC    Vitals Value Taken Time   /59 08/15/24 1350   Temp 37 08/15/24 1354   Pulse 80 08/15/24 1354   Resp 17 08/15/24 1354   SpO2 95 % 08/15/24 1354   Vitals shown include unfiled device data.    Anesthesia Post Evaluation    Patient location during evaluation: PACU  Patient participation: complete - patient participated  Level of consciousness: awake  Pain management: adequate  Airway patency: patent  Cardiovascular status: acceptable  Respiratory status: acceptable  Hydration status: acceptable  Postoperative Nausea and Vomiting: none        No notable events documented.

## 2024-08-15 NOTE — NURSING NOTE
WO nursing visit outcome: peristomal skin assessment and pouching     WO next scheduled visit/plan: patient will call to schedule appointment at ostomy clinic     Stoma Type: Transverse Colostomy  [place]: No  Diameter: 2 1/4  Location: Upper Midline  Protrusion: Protrudes slightly  Mucosal Condition and Color: Moist, Red  Mucocutaneous Junction: Intact  Peristomal Skin: Irritant Dermatitis/Denuded  Location of Skin Impairment: circumferential   Peristomal Contour: Rounded  Supportive Tissue: Soft  Character of Output: Brown and Watery w/ Food pieces  Emptying Frequency: as needed   Removed/Current Pouching System: removed 1 piece flat mirella pouch. Applied stoma powder to denuded, hollihesive and I piece flat coloplast 00266   Current Wearing Time: 1-2 Days    Recommendations:   Skin Care: apply stoma powder to dendued skin       Photo: 8/15/2024             Yanira Galarza RN BSN,[facility]  [phone]

## 2024-08-15 NOTE — DISCHARGE INSTRUCTIONS
Patient Instructions after a Colonoscopy      The anesthetics, sedatives or narcotics which were given to you today will be acting in your body for the next 24 hours, so you might feel a little sleepy or groggy.  This feeling should slowly wear off. Carefully read and follow the instructions.     You received sedation today:  - Do not drive or operate any machinery or power tools of any kind.   - No alcoholic beverages today, not even beer or wine.  - Do not make any important decisions or sign any legal documents.  - No over the counter medications that contain alcohol or that may cause drowsiness.  - Do not make any important decisions or sign any legal documents.  - Make sure you have someone with you for first 24 hours.    While it is common to experience mild to moderate abdominal distention, gas, or belching after your procedure, if any of these symptoms occur following discharge from the GI Lab or within one week of having your procedure, call the [facility] to be advised whether a visit to your nearest Urgent Care or Emergency Department is indicated.  Take this paper with you if you go.     - If you develop an allergic reaction to the medications that were given during your procedure such as difficulty breathing, rash, hives, severe nausea, vomiting or lightheadedness.  - If you experience chest pain, shortness of breath, severe abdominal pain, fevers and chills.  -If you develop signs and symptoms of bleeding such as blood in your spit, if your stools turn black, tarry, or bloody  - If you have not urinated within 8 hours following your procedure.  - If your IV site becomes painful, red, inflamed, or looks infected.    If you received a biopsy/polypectomy/sphincterotomy the following instructions apply below:    __ Do not use Aspirin containing products, non-steroidal medications or anti-coagulants for one week following your procedure. (Examples of these types of medications are: Advil,  Arthrotec, Aleve, Coumadin, Ecotrin, Heparin, Ibuprofen, Indocin, Motrin, Naprosyn, Nuprin, Plavix, Vioxx, and Voltarin, or their generic forms.  This list is not all-inclusive.  Check with your physician or pharmacist before resuming medications.)   __ Eat a soft diet today.  Avoid foods that are poorly digested for the next 24 hours.  These foods would include: nuts, beans, lettuce, red meats, and fried foods. Start with liquids and advance your diet as tolerated, gradually work up to eating solids.   __ Do not have a Barium Study or Enema for one week.    Your physician recommends the additional following instructions:    -You have a contact number available for emergencies. The signs and symptoms of potential delayed complications were discussed with you. You may return to normal activities tomorrow.  -Resume your previous diet.  -Continue your present medications.   -We are waiting for your pathology results.  -Your physician has recommended a repeat colonoscopy (date to be determined after pending pathology results are reviewed) for surveillance based on pathology results.  -The findings and recommendations have been discussed with you.  -The findings and recommendations were discussed with your family.  - Please see Medication Reconciliation Form for new medication/medications prescribed.       If you experience any problems or have any questions following discharge from the GI Lab, please call:        Nurse Signature                                                                        Date___________________                                                                            Patient/Responsible Party Signature                                        Date___________________

## 2024-08-15 NOTE — ANESTHESIA POSTPROCEDURE EVALUATION
Patient: Barbara Sow    Procedure Summary       Date: 08/15/24 Room / Location: [facility]    Anesthesia Start: 1244 Anesthesia Stop:     Procedure: COLONOSCOPY Diagnosis:       Obstruction of large intestine due to peritoneal adhesions (Multi)      Obstruction of large intestine due to peritoneal adhesions (Multi)    Scheduled Providers: Ingrid Valencia MD; Lino Calvo MD; TERESO Penn-CRNA Responsible Provider: Lino Calvo MD    Anesthesia Type: MAC ASA Status: 2            Anesthesia Type: MAC    Vitals Value Taken Time   /59 08/15/24 1350   Temp 37 08/15/24 1351   Pulse 81 08/15/24 1351   Resp 15 08/15/24 1351   SpO2 95 % 08/15/24 1351   Vitals shown include unfiled device data.    Anesthesia Post Evaluation    Patient location during evaluation: PACU  Patient participation: complete - patient participated  Level of consciousness: awake  Pain management: adequate  Airway patency: patent  Cardiovascular status: acceptable  Respiratory status: acceptable  Hydration status: acceptable  Postoperative Nausea and Vomiting: none        No notable events documented.

## 2024-08-16 ASSESSMENT — PAIN SCALES - GENERAL: PAINLEVEL_OUTOF10: 0 - NO PAIN

## 2024-08-19 ENCOUNTER — APPOINTMENT (OUTPATIENT)
Dept: HOME HEALTH SERVICES | Facility: HOME HEALTH | Age: 64
End: 2024-08-19
Payer: MEDICARE

## 2024-08-22 ENCOUNTER — HOSPITAL ENCOUNTER (EMERGENCY)
Facility: HOSPITAL | Age: 64
Discharge: HOME | End: 2024-08-23
Attending: STUDENT IN AN ORGANIZED HEALTH CARE EDUCATION/TRAINING PROGRAM
Payer: MEDICARE

## 2024-08-22 DIAGNOSIS — R10.9 ABDOMINAL PAIN, UNSPECIFIED ABDOMINAL LOCATION: Primary | ICD-10-CM

## 2024-08-22 LAB
ALBUMIN SERPL BCP-MCNC: 4.5 G/DL (ref 3.4–5)
ALP SERPL-CCNC: 107 U/L (ref 33–136)
ALT SERPL W P-5'-P-CCNC: 11 U/L (ref 7–45)
ANION GAP SERPL CALC-SCNC: 12 MMOL/L (ref 10–20)
AST SERPL W P-5'-P-CCNC: 14 U/L (ref 9–39)
BASOPHILS # BLD AUTO: 0.2 X10*3/UL (ref 0–0.1)
BASOPHILS NFR BLD AUTO: 2.4 %
BILIRUB SERPL-MCNC: 0.1 MG/DL (ref 0–1.2)
BUN SERPL-MCNC: 32 MG/DL (ref 6–23)
CALCIUM SERPL-MCNC: 9.9 MG/DL (ref 8.6–10.3)
CHLORIDE SERPL-SCNC: 109 MMOL/L (ref 98–107)
CO2 SERPL-SCNC: 23 MMOL/L (ref 21–32)
CREAT SERPL-MCNC: 0.9 MG/DL (ref 0.5–1.05)
EGFRCR SERPLBLD CKD-EPI 2021: 71 ML/MIN/1.73M*2
EOSINOPHIL # BLD AUTO: 0.68 X10*3/UL (ref 0–0.7)
EOSINOPHIL NFR BLD AUTO: 8.1 %
ERYTHROCYTE [DISTWIDTH] IN BLOOD BY AUTOMATED COUNT: 15.9 % (ref 11.5–14.5)
GLUCOSE SERPL-MCNC: 90 MG/DL (ref 74–99)
HCT VFR BLD AUTO: 31.2 % (ref 36–46)
HGB BLD-MCNC: 9.7 G/DL (ref 12–16)
IMM GRANULOCYTES # BLD AUTO: 0.04 X10*3/UL (ref 0–0.7)
IMM GRANULOCYTES NFR BLD AUTO: 0.5 % (ref 0–0.9)
LYMPHOCYTES # BLD AUTO: 3.33 X10*3/UL (ref 1.2–4.8)
LYMPHOCYTES NFR BLD AUTO: 39.9 %
MCH RBC QN AUTO: 28.3 PG (ref 26–34)
MCHC RBC AUTO-ENTMCNC: 31.1 G/DL (ref 32–36)
MCV RBC AUTO: 91 FL (ref 80–100)
MONOCYTES # BLD AUTO: 0.64 X10*3/UL (ref 0.1–1)
MONOCYTES NFR BLD AUTO: 7.7 %
NEUTROPHILS # BLD AUTO: 3.46 X10*3/UL (ref 1.2–7.7)
NEUTROPHILS NFR BLD AUTO: 41.4 %
NRBC BLD-RTO: 0 /100 WBCS (ref 0–0)
PLATELET # BLD AUTO: 430 X10*3/UL (ref 150–450)
POTASSIUM SERPL-SCNC: 4.4 MMOL/L (ref 3.5–5.3)
PROT SERPL-MCNC: 7.9 G/DL (ref 6.4–8.2)
RBC # BLD AUTO: 3.43 X10*6/UL (ref 4–5.2)
SODIUM SERPL-SCNC: 140 MMOL/L (ref 136–145)
WBC # BLD AUTO: 8.4 X10*3/UL (ref 4.4–11.3)

## 2024-08-22 PROCEDURE — 96374 THER/PROPH/DIAG INJ IV PUSH: CPT

## 2024-08-22 PROCEDURE — 80053 COMPREHEN METABOLIC PANEL: CPT | Performed by: STUDENT IN AN ORGANIZED HEALTH CARE EDUCATION/TRAINING PROGRAM

## 2024-08-22 PROCEDURE — 85025 COMPLETE CBC W/AUTO DIFF WBC: CPT | Performed by: STUDENT IN AN ORGANIZED HEALTH CARE EDUCATION/TRAINING PROGRAM

## 2024-08-22 PROCEDURE — 99284 EMERGENCY DEPT VISIT MOD MDM: CPT

## 2024-08-22 PROCEDURE — 2500000004 HC RX 250 GENERAL PHARMACY W/ HCPCS (ALT 636 FOR OP/ED): Performed by: STUDENT IN AN ORGANIZED HEALTH CARE EDUCATION/TRAINING PROGRAM

## 2024-08-22 PROCEDURE — 96375 TX/PRO/DX INJ NEW DRUG ADDON: CPT

## 2024-08-22 PROCEDURE — 36415 COLL VENOUS BLD VENIPUNCTURE: CPT | Performed by: STUDENT IN AN ORGANIZED HEALTH CARE EDUCATION/TRAINING PROGRAM

## 2024-08-22 RX ORDER — FENTANYL CITRATE 50 UG/ML
50 INJECTION, SOLUTION INTRAMUSCULAR; INTRAVENOUS ONCE
Status: COMPLETED | OUTPATIENT
Start: 2024-08-22 | End: 2024-08-22

## 2024-08-22 RX ORDER — ONDANSETRON HYDROCHLORIDE 2 MG/ML
4 INJECTION, SOLUTION INTRAVENOUS ONCE
Status: COMPLETED | OUTPATIENT
Start: 2024-08-22 | End: 2024-08-22

## 2024-08-22 ASSESSMENT — PAIN SCALES - GENERAL: PAINLEVEL_OUTOF10: 8

## 2024-08-22 ASSESSMENT — LIFESTYLE VARIABLES
EVER HAD A DRINK FIRST THING IN THE MORNING TO STEADY YOUR NERVES TO GET RID OF A HANGOVER: NO
TOTAL SCORE: 0
EVER FELT BAD OR GUILTY ABOUT YOUR DRINKING: NO
HAVE YOU EVER FELT YOU SHOULD CUT DOWN ON YOUR DRINKING: NO
HAVE PEOPLE ANNOYED YOU BY CRITICIZING YOUR DRINKING: NO

## 2024-08-23 ENCOUNTER — APPOINTMENT (OUTPATIENT)
Dept: RADIOLOGY | Facility: HOSPITAL | Age: 64
End: 2024-08-23
Payer: MEDICARE

## 2024-08-23 VITALS
WEIGHT: 124 LBS | HEIGHT: 66 IN | HEART RATE: 78 BPM | SYSTOLIC BLOOD PRESSURE: 134 MMHG | OXYGEN SATURATION: 99 % | RESPIRATION RATE: 18 BRPM | BODY MASS INDEX: 19.93 KG/M2 | TEMPERATURE: 98.2 F | DIASTOLIC BLOOD PRESSURE: 78 MMHG

## 2024-08-23 LAB
APPEARANCE UR: CLEAR
BILIRUB UR STRIP.AUTO-MCNC: NEGATIVE MG/DL
COLOR UR: ABNORMAL
GLUCOSE UR STRIP.AUTO-MCNC: NORMAL MG/DL
HOLD SPECIMEN: NORMAL
KETONES UR STRIP.AUTO-MCNC: NEGATIVE MG/DL
LEUKOCYTE ESTERASE UR QL STRIP.AUTO: ABNORMAL
MUCOUS THREADS #/AREA URNS AUTO: NORMAL /LPF
NITRITE UR QL STRIP.AUTO: NEGATIVE
PH UR STRIP.AUTO: 5.5 [PH]
PROT UR STRIP.AUTO-MCNC: ABNORMAL MG/DL
RBC # UR STRIP.AUTO: NEGATIVE /UL
RBC #/AREA URNS AUTO: NORMAL /HPF
SP GR UR STRIP.AUTO: 1.04
SQUAMOUS #/AREA URNS AUTO: NORMAL /HPF
UROBILINOGEN UR STRIP.AUTO-MCNC: NORMAL MG/DL
WBC #/AREA URNS AUTO: NORMAL /HPF

## 2024-08-23 PROCEDURE — 2500000004 HC RX 250 GENERAL PHARMACY W/ HCPCS (ALT 636 FOR OP/ED): Performed by: EMERGENCY MEDICINE

## 2024-08-23 PROCEDURE — 2550000001 HC RX 255 CONTRASTS: Performed by: STUDENT IN AN ORGANIZED HEALTH CARE EDUCATION/TRAINING PROGRAM

## 2024-08-23 PROCEDURE — 96376 TX/PRO/DX INJ SAME DRUG ADON: CPT

## 2024-08-23 PROCEDURE — 87086 URINE CULTURE/COLONY COUNT: CPT | Mod: PORLAB | Performed by: STUDENT IN AN ORGANIZED HEALTH CARE EDUCATION/TRAINING PROGRAM

## 2024-08-23 PROCEDURE — 81001 URINALYSIS AUTO W/SCOPE: CPT | Performed by: STUDENT IN AN ORGANIZED HEALTH CARE EDUCATION/TRAINING PROGRAM

## 2024-08-23 PROCEDURE — 2500000004 HC RX 250 GENERAL PHARMACY W/ HCPCS (ALT 636 FOR OP/ED): Performed by: STUDENT IN AN ORGANIZED HEALTH CARE EDUCATION/TRAINING PROGRAM

## 2024-08-23 PROCEDURE — 74177 CT ABD & PELVIS W/CONTRAST: CPT | Performed by: RADIOLOGY

## 2024-08-23 PROCEDURE — 74177 CT ABD & PELVIS W/CONTRAST: CPT

## 2024-08-23 PROCEDURE — 96375 TX/PRO/DX INJ NEW DRUG ADDON: CPT

## 2024-08-23 RX ORDER — MORPHINE SULFATE 4 MG/ML
4 INJECTION INTRAVENOUS ONCE
Status: COMPLETED | OUTPATIENT
Start: 2024-08-23 | End: 2024-08-23

## 2024-08-23 RX ORDER — METRONIDAZOLE 500 MG/1
500 TABLET ORAL 3 TIMES DAILY
Qty: 30 TABLET | Refills: 0 | Status: SHIPPED | OUTPATIENT
Start: 2024-08-23 | End: 2024-09-02

## 2024-08-23 RX ORDER — ONDANSETRON HYDROCHLORIDE 8 MG/1
8 TABLET, FILM COATED ORAL EVERY 8 HOURS PRN
Qty: 15 TABLET | Refills: 0 | Status: SHIPPED | OUTPATIENT
Start: 2024-08-23

## 2024-08-23 RX ORDER — TRAMADOL HYDROCHLORIDE 50 MG/1
50 TABLET ORAL EVERY 6 HOURS PRN
Qty: 15 TABLET | Refills: 0 | Status: SHIPPED | OUTPATIENT
Start: 2024-08-23 | End: 2024-08-27

## 2024-08-23 RX ORDER — CIPROFLOXACIN 500 MG/1
500 TABLET ORAL 2 TIMES DAILY
Qty: 20 TABLET | Refills: 0 | Status: SHIPPED | OUTPATIENT
Start: 2024-08-23 | End: 2024-09-02

## 2024-08-23 ASSESSMENT — ENCOUNTER SYMPTOMS
CARDIOVASCULAR NEGATIVE: 1
FEVER: 0
RESPIRATORY NEGATIVE: 1
NEUROLOGICAL NEGATIVE: 1
NAUSEA: 1
FLANK PAIN: 0
ABDOMINAL PAIN: 1
VOMITING: 0
MUSCULOSKELETAL NEGATIVE: 1
DYSURIA: 0
DIARRHEA: 0

## 2024-08-23 ASSESSMENT — PAIN - FUNCTIONAL ASSESSMENT: PAIN_FUNCTIONAL_ASSESSMENT: 0-10

## 2024-08-23 ASSESSMENT — PAIN DESCRIPTION - LOCATION: LOCATION: ABDOMEN

## 2024-08-23 ASSESSMENT — PAIN DESCRIPTION - PAIN TYPE: TYPE: ACUTE PAIN

## 2024-08-23 ASSESSMENT — PAIN SCALES - GENERAL: PAINLEVEL_OUTOF10: 8

## 2024-08-23 NOTE — ED PROVIDER NOTES
[facility] EMERGENCY DEPARTMENT ENCOUNTER    Pt Name:Barbara Sow  MRN: [id number]  Birthdate 1960  Date of evaluation: 08/23/24  PCP:  Maninder Christensen MD    CHIEF COMPLAINT       Chief Complaint   Patient presents with    Abdominal Pain     Has ostomy, pt c/o pain around site, recently had a scope done    Nausea       HISTORY OF PRESENT ILLNESS  (Location/Symptom, Timing/Onset, Context/Setting, Quality, Duration, Modifying Factors, Severity.)      Barbara Sow is a 64 y.o. female who presents with pain around her ostomy site. She has a history of LBO s/p diverting transverse loop colostomy on 7/19/2024 and recently underwent colonoscopy via stoma site and rectum on 8/15/2024. She states she woke from sleep last night with sudden sharp pain at the stoma site, and the pain has persisted since that time. She states she has had some decreased output from the stoma site, only having to change her ostomy bag twice in the last 24 hours, but has not noted a change in the stool consistency. She states she noticed a small spot of blood from the ostomy site once but that bleeding had not recurred. She has had some nausea. She denies fevers, vomiting, change in ostomy consistency, changes in urinary symptoms, chest pain, shortness of breath, flank pain.    PAST MEDICAL / SURGICAL / SOCIAL / FAMILY HISTORY      has a past medical history of Anxiety and depression, Hypothyroidism, unspecified, Ileus, unspecified (Multi), and SBO (small bowel obstruction) (Multi) (07/18/2024).       has a past surgical history that includes Cholecystectomy; Appendectomy; Hysterectomy; and Other surgical history (07/19/2024).      Social History     Socioeconomic History    Marital status: Single     Spouse name: Not on file    Number of children: Not on file    Years of education: Not on file    Highest education level: Not on file   Occupational History    Not on file   Tobacco Use    Smoking status: Every Day     Current  packs/day: 2.00     Types: Cigarettes    Smokeless tobacco: Never   Vaping Use    Vaping status: Never Used   Substance and Sexual Activity    Alcohol use: Yes     Comment: 1 A MONTH    Drug use: Never    Sexual activity: Defer   Other Topics Concern    Not on file   Social History Narrative    Not on file     Social Determinants of Health     Financial Resource Strain: Low Risk  (7/27/2024)    Overall Financial Resource Strain (CARDIA)     Difficulty of Paying Living Expenses: Not hard at all   Recent Concern: Financial Resource Strain - High Risk (7/18/2024)    Overall Financial Resource Strain (CARDIA)     Difficulty of Paying Living Expenses: Hard   Food Insecurity: Not on File (8/15/2023)    Received from [place]    Food Insecurity     Food: 0   Recent Concern: Food Insecurity - At Risk (8/15/2023)    Received from [facility]    Food Insecurity     Food: 2   Transportation Needs: No Transportation Needs (8/6/2024)    OASIS : Transportation     Lack of Transportation (Medical): No     Lack of Transportation (Non-Medical): No     Patient Unable or Declines to Respond: No   Physical Activity: Not on File (8/10/2023)    Received from [facility]    Physical Activity     Physical Activity: 0   Stress: Not on File (8/15/2023)    Received from [place]    Stress     Stress: 0   Recent Concern: Stress - At Risk (8/15/2023)    Received from [facility]    Stress     Stress: 2   Social Connections: Feeling Socially Integrated (8/6/2024)    OASIS : Social Isolation     Frequency of experiencing loneliness or isolation: Never   Intimate Partner Violence: Not on file   Housing Stability: Low Risk  (7/27/2024)    Housing Stability Vital Sign     Unable to Pay for Housing in the Last Year: No     Number of Times Moved in the Last Year: 1     Homeless in the Last Year: No   Recent Concern: Housing Stability - High Risk (7/18/2024)    Housing Stability Vital Sign     Unable to Pay for Housing in the Last Year: Yes      "Number of Times Moved in the Last Year: 2     Homeless in the Last Year: Yes       Family History   Problem Relation Name Age of Onset    No Known Problems Mother      Heart attack Father      Aneurysm Sister         Allergies:  Cefaclor, Cefuroxime axetil, and Ketorolac    Home Medications:  Prior to Admission medications    Medication Sig Start Date End Date Taking? Authorizing Provider   acetaminophen (TylenoL) 325 mg tablet Take 1 tablet (325 mg) by mouth 4 times a day as needed for mild pain (1 - 3).    Historical Provider, MD   DULoxetine (Cymbalta) 60 mg DR capsule Take 1 capsule (60 mg) by mouth once daily. Do not crush or chew.    Historical Provider, MD   gabapentin (Neurontin) 600 mg tablet Take 2 tabs in the morning and 3 tabs at night. 8/1/24   Maninder Christensen MD   levothyroxine (Synthroid, Levoxyl) 100 mcg tablet Take 1 tablet (100 mcg) by mouth early in the morning.. Except on Sundays take 2 tabs 8/1/24 1/28/25  Maninder Christensen MD   polyethylene glycol (Glycolax, Miralax) 17 gram/dose powder Take 17 g by mouth once daily. Do not fill before July 30, 2024. 7/30/24   Sharee Freeman MD       REVIEW OF SYSTEMS       Review of Systems   Constitutional:  Negative for fever.   HENT: Negative.     Respiratory: Negative.     Cardiovascular: Negative.    Gastrointestinal:  Positive for abdominal pain and nausea. Negative for diarrhea and vomiting.   Genitourinary:  Negative for dysuria and flank pain.   Musculoskeletal: Negative.    Neurological: Negative.        PHYSICAL EXAM      INITIAL VITALS:   /76   Pulse 76   Temp 36.8 °C (98.2 °F) (Tympanic)   Resp 16   Ht 1.676 m (5' 6\")   Wt 56.2 kg (124 lb)   SpO2 96%   BMI 20.01 kg/m²     Physical Exam  Vitals reviewed.   Cardiovascular:      Rate and Rhythm: Normal rate and regular rhythm.   Pulmonary:      Effort: Pulmonary effort is normal.      Breath sounds: Normal breath sounds.   Abdominal:      Palpations: Abdomen is soft.      Tenderness: " There is no abdominal tenderness. There is no right CVA tenderness, left CVA tenderness, guarding or rebound.      Comments: Stoma pink and patent with output in the ostomy bag   Neurological:      General: No focal deficit present.      Mental Status: She is alert.           DDX/DIAGNOSTIC RESULTS / EMERGENCY DEPARTMENT COURSE / MDM     Medical Decision Making  65 yo F with hx of LBO s/p diverting transverse loop colostomy on 7/19/2024 and recently underwent colonoscopy via stoma site and rectum on 8/15/2024, woke from sleep with pain around ostomy site. On exam, patient nontoxic appearing. Abdomen soft and nontender, ostomy site pink and patent with output in the ostomy bag. Vitals stable. Endorsing sharp stabbing pain at the site of the ostomy, will obtain CT abdomen pelvis with contrast to evaluate intraabdominally. Will obtain lab workup to rule out significant leukocytosis or anemia. Will give symptomatic treatment and reevaluate.         EMERGENCY DEPARTMENT COURSE:    Diagnoses as of 08/23/24 0203   Abdominal pain, unspecified abdominal location     FINAL IMPRESSION      1. Abdominal pain, unspecified abdominal location          DISPOSITION / PLAN     Signed out to oncoming attending pending CT imaging and dispo 8/23/2024 1:00 AM    PATIENT REFERRED TO:  Maninder Christensen MD  [address]  [place] [address]  [phone]    In 2 days      [facility]  [address] [address] [phone]    If symptoms worsen      DISCHARGE MEDICATIONS:  New Prescriptions    ONDANSETRON (ZOFRAN) 8 MG TABLET    Take 1 tablet (8 mg) by mouth every 8 hours if needed for nausea or vomiting for up to 15 doses.    TRAMADOL (ULTRAM) 50 MG TABLET    Take 1 tablet (50 mg) by mouth every 6 hours if needed for severe pain (7 - 10) for up to 4 days.       Monet Plasencia MD  Emergency Medicine Attending Physician    (Please note that  portions of this note were completed with a voice recognition program.  Efforts were made to edit the dictations but occasionally words are mis-transcribed.)     Monet Plasencia MD  08/23/24 0207

## 2024-08-23 NOTE — ED PROVIDER NOTES
The patient's case was signed out to me by the outgoing physician.  CAT scan of the patient's abdomen and pelvis with IV contrast demonstrated postsurgical changes and wall thickening of transverse colon likely ready presenting with colitis but no other significant acute abnormalities.    Patient presents to the emergency department with complaints of abdominal pain.  Workup was performed as above and demonstrated presence of colitis.  Patient was reassured patient was given morphine in the emergency department for pain control patient was given a prescription for Ultram and Zofran for symptom control at home the patient was given a prescription for ciprofloxacin as well as metronidazole for this likely an infectious colitis she was instructed to return if she has no improvement of her symptoms or she begins to develop a fever.  Patient expressed understanding and agreement.  The patient was then discharged home in improved condition.     Richie Rivas MD  08/28/24 9089

## 2024-08-24 LAB — BACTERIA UR CULT: NO GROWTH

## 2024-08-26 ENCOUNTER — HOME CARE VISIT (OUTPATIENT)
Dept: HOME HEALTH SERVICES | Facility: HOME HEALTH | Age: 64
End: 2024-08-26
Payer: MEDICARE

## 2024-08-26 LAB
LABORATORY COMMENT REPORT: NORMAL
PATH REPORT.FINAL DX SPEC: NORMAL
PATH REPORT.GROSS SPEC: NORMAL
PATH REPORT.TOTAL CANCER: NORMAL

## 2024-08-26 PROCEDURE — G0300 HHS/HOSPICE OF LPN EA 15 MIN: HCPCS | Mod: HHH

## 2024-08-26 ASSESSMENT — ENCOUNTER SYMPTOMS
COUGH CHARACTERISTICS: NON-PRODUCTIVE
CHANGE IN APPETITE: UNCHANGED
DENIES PAIN: 1
APPETITE LEVEL: GOOD
COUGH: 1
LAST BOWEL MOVEMENT: 67078

## 2024-08-27 ENCOUNTER — PATIENT OUTREACH (OUTPATIENT)
Dept: PRIMARY CARE | Facility: CLINIC | Age: 64
End: 2024-08-27
Payer: MEDICARE

## 2024-09-05 ENCOUNTER — HOME CARE VISIT (OUTPATIENT)
Dept: HOME HEALTH SERVICES | Facility: HOME HEALTH | Age: 64
End: 2024-09-05
Payer: MEDICARE

## 2024-09-05 VITALS
DIASTOLIC BLOOD PRESSURE: 65 MMHG | RESPIRATION RATE: 18 BRPM | HEART RATE: 80 BPM | OXYGEN SATURATION: 96 % | TEMPERATURE: 98.8 F | SYSTOLIC BLOOD PRESSURE: 133 MMHG

## 2024-09-05 PROCEDURE — G0300 HHS/HOSPICE OF LPN EA 15 MIN: HCPCS | Mod: HHH

## 2024-09-05 ASSESSMENT — ENCOUNTER SYMPTOMS
SUBJECTIVE PAIN PROGRESSION: UNCHANGED
HIGHEST PAIN SEVERITY IN PAST 24 HOURS: 0/10
LOWEST PAIN SEVERITY IN PAST 24 HOURS: 0/10
PERSON REPORTING PAIN: PATIENT
APPETITE LEVEL: GOOD
PAIN SEVERITY GOAL: 0/10
HEADACHES: 1
LAST BOWEL MOVEMENT: 67088
CHANGE IN APPETITE: UNCHANGED
DENIES PAIN: 1

## 2024-09-05 ASSESSMENT — PAIN SCALES - PAIN ASSESSMENT IN ADVANCED DEMENTIA (PAINAD)
FACIALEXPRESSION: 0 - SMILING OR INEXPRESSIVE.
NEGVOCALIZATION: 0 - NONE.
TOTALSCORE: 0
BODYLANGUAGE: 0 - RELAXED.
FACIALEXPRESSION: 0
CONSOLABILITY: 0
CONSOLABILITY: 0 - NO NEED TO CONSOLE.
NEGVOCALIZATION: 0
BREATHING: 0
BODYLANGUAGE: 0

## 2024-09-12 ENCOUNTER — PHARMACY VISIT (OUTPATIENT)
Dept: PHARMACY | Facility: CLINIC | Age: 64
End: 2024-09-12
Payer: COMMERCIAL

## 2024-09-12 PROCEDURE — RXMED WILLOW AMBULATORY MEDICATION CHARGE

## 2024-09-15 ENCOUNTER — HOME CARE VISIT (OUTPATIENT)
Dept: HOME HEALTH SERVICES | Facility: HOME HEALTH | Age: 64
End: 2024-09-15
Payer: MEDICARE

## 2024-09-15 VITALS
HEART RATE: 80 BPM | DIASTOLIC BLOOD PRESSURE: 80 MMHG | SYSTOLIC BLOOD PRESSURE: 128 MMHG | RESPIRATION RATE: 16 BRPM | TEMPERATURE: 98.1 F

## 2024-09-15 PROCEDURE — G0299 HHS/HOSPICE OF RN EA 15 MIN: HCPCS | Mod: HHH

## 2024-09-15 ASSESSMENT — ENCOUNTER SYMPTOMS
PAIN LOCATION: ABDOMEN
PAIN LOCATION - PAIN SEVERITY: 6/10
HIGHEST PAIN SEVERITY IN PAST 24 HOURS: 6/10
PAIN: 1
ABDOMINAL PAIN: 1
SUBJECTIVE PAIN PROGRESSION: GRADUALLY WORSENING
STOOL FREQUENCY: DAILY
LOWEST PAIN SEVERITY IN PAST 24 HOURS: 3/10
CHANGE IN APPETITE: UNCHANGED
BOWEL PATTERN NORMAL: 1
APPETITE LEVEL: FAIR
PAIN LOCATION - PAIN QUALITY: ACHY
PAIN SEVERITY GOAL: 3/10

## 2024-09-15 ASSESSMENT — ACTIVITIES OF DAILY LIVING (ADL): MONEY MANAGEMENT (EXPENSES/BILLS): INDEPENDENT

## 2024-09-16 ENCOUNTER — HOME CARE VISIT (OUTPATIENT)
Dept: HOME HEALTH SERVICES | Facility: HOME HEALTH | Age: 64
End: 2024-09-16
Payer: MEDICARE

## 2024-09-16 VITALS
HEART RATE: 74 BPM | SYSTOLIC BLOOD PRESSURE: 140 MMHG | DIASTOLIC BLOOD PRESSURE: 80 MMHG | RESPIRATION RATE: 16 BRPM | TEMPERATURE: 97.4 F

## 2024-09-16 PROCEDURE — G0299 HHS/HOSPICE OF RN EA 15 MIN: HCPCS | Mod: HHH

## 2024-09-16 ASSESSMENT — ENCOUNTER SYMPTOMS
SKIN LESIONS: 1
PAIN: 1
PERSON REPORTING PAIN: PATIENT
APPETITE LEVEL: FAIR
PAIN LOCATION - PAIN QUALITY: STABBING
PAIN LOCATION: ABDOMEN

## 2024-09-17 NOTE — HOME HEALTH
"supplies ordered via cardinal to [employer]  Item CodeDescriptionQuantityItem Status Warehouse Code ShipVia Tracking  [id number] OSTOMY SUPPORT BRAVA MEDIUM1 Each- - - -  [id number] SKIN BARRIER PROTECTIVE SHEET 6\" X 6\" LF2 Box- - - -  [id number] RING STANDARD 4.5 MM, 2\" [id number] Each"

## 2024-09-17 NOTE — CASE COMMUNICATION
Maranda,   I was out to see Barbara this morning.   her skin is better since you saw her yesterday. I used the hollihesive on the top above the stoma after powder and skin barrier film .   used a barrier extender under her stoma , then ring and pouch and xl barrier extencers. used stoma paste in umbilicus. and added a belt.   i think her parastomal hernia is causing some pulling and. i ordered a support belt. there is a youtube on how to a pply it. i will email you with the youtube     ordered some 6x6 brava similar to hollihesive but it is larger and you can use under the whole pouch to help the skin. and it stretches with the body unlike the hollihesive.     tremaine

## 2024-09-17 NOTE — PROGRESS NOTES
Barbara Sow is a 64 year old female who is s/p  a loop transverse colostomy on 19th July 2024 for a Large bowel obstruction.  Presents to office today to discuss ostomy reversal.    7/18/2024 CT Abd/Pelvis with contrast  -Dilated stool-filled colon with transition point in the region of the splenic flexure, suggesting at least partial obstruction. The colon is decompressed distal to this region. There is circumferential bowel wall thickening involving the mid and distal transverse colon, proximally 25 cm in length. Although the appearance favors colitis, colonoscopy is recommended to exclude an underlying mass given the presence of a transition point.  -Nonobstructive left renal calculi.    7/19/2024 G[facility]  The splenic flexure has a mass/stricture with severe stenosis; only a trace amount of contrast could be passed across the stenosis towards the transverse colon.    7/18/24 Operation:  Exploration Laparotomy, transverse colostomy ([facility])     7/27/2024 CT Abd/Pelvis with contrast  -There is mural thickening of the colon about a loop colostomy in the paramedian right upper quadrant, compatible with colitis. There is herniation of the everted edematous colon at the stoma site. There is a large volume of stool proximal to the stoma suggesting constipation, without kaden colonic dilation to suggest obstruction. There is retained hyperdense contrast in the colonic lumen distal to the inflamed colon.  -No subcutaneous air or fluid collection.  -4 mm nonobstructing calculus in the interpolar left kidney.  -Circumferential submucosal edema in the distal esophagus suggesting reflux esophagitis.  -Hepatomegaly.  -Cholecystectomy.  -Additional findings as discussed above.      8/04/2024 CT Abd/Pelvis with contrast  Findings are similar to 07/27/2024 CT abdomen and pelvis. There is re-demonstration of colitis involving the transverse colon with a transverse loop colostomy which is also inflamed.  No other acute  findings in the abdomen pelvis.    8/15/2024 Colonoscopy to [facility] ([facility])  Findings  The ascending colon appeared normal.  Stricture (traversable after dilation) with length of 1.5 cm and diameter of 3 mm; performed 6 cold forceps biopsies; dilated with 50 mm long balloon dilator from 10 mm starting size to 12 mm end size with step size of 11 mm. Dilation caused improved passage of the scope and improved lumen appearance; tattooed distal to the finding with laila ink  I was able to scope the entire colon distal to the stricture - the prep was excellent in the descending and sigmoid colon.  There were formed stool balls in the transverse colon between stoma and the stricture and in the rectum and distal sigmoid precluding adequate examination.  We scoped proximal to the stoma, distal to the stoma encountered the stricture, dilated and were able to pass through that to the sigmoid, and then scoped from the anus to the stricture.  Pathology:  A. COLON, TRANSVERSE, BIOPSY:     Colonic mucosa with fibrosis of the lamina propria.   Note: No neoplasia is identified.  No active inflammation is identified on initial and recut levels.     Past Medical History  Anxiety/Depression  Hypothyroidism  Bowel obstruction    Surgical History  Appendectomy  Cholecystectomy  Hysterectomy  Exp Lap, Transverse colostomy     Social History  Smoking:   ETOH:    Family History    Review of Systems  Constitutional: Negative for fever, chills, anorexia, weight loss, malaise     ENMT: Negative for nasal discharge, congestion, ear pain, mouth pain, throat pain     Respiratory: Negative for cough, hemoptysis, wheezing, shortness of breath     Cardiac: Negative for chest pain, dyspnea on exertion, orthopnea, palpitations, syncope     Gastrointestinal: Negative for nausea, vomiting, diarrhea, constipation, abdominal pain, (+)COLON STRICTURE, (+)BOWEL OBSTRUCTION, (+)COLOSTOMY    Genitourinary: Negative for discharge, dysuria, flank pain, frequency,  hematuria     Musculoskeletal: Negative for decreased ROM, pain, swelling, weakness     Neurological: Negative for dizziness, confusion, headache, seizures, syncope     Psychiatric: Negative for mood changes, anxiety, hallucinations, sleep changes, suicidal ideas     Skin: Negative for mass, pain, itching, rash, ulcer     Endocrine: Negative for heat intolerance, cold intolerance, excessive sweating, polyuria, excess thirst, (+)HYPOTHRYOIDISM     Hematologic/Lymph: Negative for anemia, bruising, easy bleeding, night sweats, petechiae, history of DVT/PE or cancer     Allergic/Immunologic: Negative for anaphylaxis, itchy/ teary eyes, itching, sneezing, swelling    Physical Exam  Constitutional: Well developed, awake/alert/oriented x3, no distress, alert and cooperative             Eyes: Sclera anicteric, no conjunctival inflammation, conjugate gaze    ENMT: mucous membranes moist, no apparent injury,            Head/Neck: Neck supple, no apparent injury, No JVD, trachea midline, no bruits              Respiratory/Thorax: Patent airways, CTAB, normal breath sounds with good chest expansion, thorax symmetric         Cardiovascular: Regular, rate and rhythm, no murmurs, normal S1 and S2         Gastrointestinal: Nondistended, soft, non-tender, no rebound tenderness or guarding, no masses palpable, no organomegaly, +BS, no bruits               Extremities: normal extremities, no cyanosis edema, contusions or wounds, 2+ femoral pulses B/L              Neurological: alert and oriented x3, normal strength, Normal gait          Lymphatic: No palpable inguinal lymphadenopathy   Psychological: Appropriate mood and behavior         Skin: Warm and dry, no lesions, no rashes                Anorectal:      Impression:      Plan:

## 2024-09-17 NOTE — HOME HEALTH
[facility]  home nursing visit   identifed by name and bd    stoma type:  colostomy   size: 2 in   location:protrustion: yes   mucocutaneous junction: parastoma hernia   mucosal condition and color: red and moist   Peristomal Skin: painful denuded   Peristomal contour: outward   character of output:varies     pouching system used : [id number]  mirella   accessories used: ring, paste in umbilicus. barrier extender under stoma on the bottom . hollihesive on the top half piece.   added belt.       ordered hernia support belt.  measured abd at 33 inches . ordered medium support belt.     reprots skin is a bit better than yesterday.     reports some abd stabbing. pain. .  no vomitting. remains nausesated that is not new.       appetite is fair.   drinking plenty of fluids.

## 2024-09-19 ENCOUNTER — HOME CARE VISIT (OUTPATIENT)
Dept: HOME HEALTH SERVICES | Facility: HOME HEALTH | Age: 64
End: 2024-09-19
Payer: MEDICARE

## 2024-09-19 VITALS
SYSTOLIC BLOOD PRESSURE: 118 MMHG | DIASTOLIC BLOOD PRESSURE: 50 MMHG | RESPIRATION RATE: 16 BRPM | HEART RATE: 86 BPM | TEMPERATURE: 97.8 F

## 2024-09-19 PROCEDURE — G0299 HHS/HOSPICE OF RN EA 15 MIN: HCPCS | Mod: HHH

## 2024-09-19 SDOH — ECONOMIC STABILITY: GENERAL

## 2024-09-19 ASSESSMENT — ENCOUNTER SYMPTOMS
APPETITE LEVEL: FAIR
BOWEL PATTERN NORMAL: 1
ABDOMINAL PAIN: 1
DENIES PAIN: 1
STOOL FREQUENCY: DAILY

## 2024-09-19 ASSESSMENT — ACTIVITIES OF DAILY LIVING (ADL): MONEY MANAGEMENT (EXPENSES/BILLS): INDEPENDENT

## 2024-09-26 ENCOUNTER — APPOINTMENT (OUTPATIENT)
Dept: HOME HEALTH SERVICES | Facility: HOME HEALTH | Age: 64
End: 2024-09-26
Payer: MEDICARE

## 2024-10-02 ENCOUNTER — APPOINTMENT (OUTPATIENT)
Dept: SURGERY | Facility: CLINIC | Age: 64
End: 2024-10-02
Payer: MEDICARE

## 2024-10-03 ENCOUNTER — HOME CARE VISIT (OUTPATIENT)
Dept: HOME HEALTH SERVICES | Facility: HOME HEALTH | Age: 64
End: 2024-10-03
Payer: MEDICARE

## 2024-10-03 VITALS
SYSTOLIC BLOOD PRESSURE: 118 MMHG | DIASTOLIC BLOOD PRESSURE: 62 MMHG | HEART RATE: 64 BPM | RESPIRATION RATE: 16 BRPM | OXYGEN SATURATION: 96 % | TEMPERATURE: 97.8 F

## 2024-10-03 PROCEDURE — G0299 HHS/HOSPICE OF RN EA 15 MIN: HCPCS | Mod: HHH

## 2024-10-03 ASSESSMENT — ENCOUNTER SYMPTOMS: DENIES PAIN: 1

## 2024-10-03 ASSESSMENT — ACTIVITIES OF DAILY LIVING (ADL)
OASIS_M1830: 00
HOME_HEALTH_OASIS: 00

## 2024-10-10 ENCOUNTER — HOSPITAL ENCOUNTER (EMERGENCY)
Facility: HOSPITAL | Age: 64
Discharge: HOME | End: 2024-10-10
Attending: STUDENT IN AN ORGANIZED HEALTH CARE EDUCATION/TRAINING PROGRAM
Payer: MEDICARE

## 2024-10-10 VITALS
BODY MASS INDEX: 19.78 KG/M2 | SYSTOLIC BLOOD PRESSURE: 151 MMHG | DIASTOLIC BLOOD PRESSURE: 96 MMHG | RESPIRATION RATE: 18 BRPM | OXYGEN SATURATION: 96 % | WEIGHT: 126 LBS | HEIGHT: 67 IN | TEMPERATURE: 98.4 F | HEART RATE: 103 BPM

## 2024-10-10 DIAGNOSIS — K94.19 ALTERED BOWEL ELIMINATION DUE TO INTESTINAL OSTOMY (MULTI): Primary | ICD-10-CM

## 2024-10-10 LAB
ABO GROUP (TYPE) IN BLOOD: NORMAL
ALBUMIN SERPL BCP-MCNC: 4.8 G/DL (ref 3.4–5)
ALP SERPL-CCNC: 123 U/L (ref 33–136)
ALT SERPL W P-5'-P-CCNC: 20 U/L (ref 7–45)
ANION GAP SERPL CALC-SCNC: 14 MMOL/L (ref 10–20)
ANTIBODY SCREEN: NORMAL
AST SERPL W P-5'-P-CCNC: 21 U/L (ref 9–39)
BASOPHILS # BLD AUTO: 0.24 X10*3/UL (ref 0–0.1)
BASOPHILS NFR BLD AUTO: 2.4 %
BILIRUB DIRECT SERPL-MCNC: 0 MG/DL (ref 0–0.3)
BILIRUB SERPL-MCNC: 0.2 MG/DL (ref 0–1.2)
BUN SERPL-MCNC: 23 MG/DL (ref 6–23)
CALCIUM SERPL-MCNC: 9.7 MG/DL (ref 8.6–10.3)
CHLORIDE SERPL-SCNC: 99 MMOL/L (ref 98–107)
CO2 SERPL-SCNC: 23 MMOL/L (ref 21–32)
CREAT SERPL-MCNC: 0.81 MG/DL (ref 0.5–1.05)
EGFRCR SERPLBLD CKD-EPI 2021: 81 ML/MIN/1.73M*2
EOSINOPHIL # BLD AUTO: 0.87 X10*3/UL (ref 0–0.7)
EOSINOPHIL NFR BLD AUTO: 8.8 %
ERYTHROCYTE [DISTWIDTH] IN BLOOD BY AUTOMATED COUNT: 17 % (ref 11.5–14.5)
GLUCOSE SERPL-MCNC: 104 MG/DL (ref 74–99)
HCT VFR BLD AUTO: 36.3 % (ref 36–46)
HGB BLD-MCNC: 11.3 G/DL (ref 12–16)
IMM GRANULOCYTES # BLD AUTO: 0.04 X10*3/UL (ref 0–0.7)
IMM GRANULOCYTES NFR BLD AUTO: 0.4 % (ref 0–0.9)
LACTATE SERPL-SCNC: 0.7 MMOL/L (ref 0.4–2)
LIPASE SERPL-CCNC: 22 U/L (ref 9–82)
LYMPHOCYTES # BLD AUTO: 2.54 X10*3/UL (ref 1.2–4.8)
LYMPHOCYTES NFR BLD AUTO: 25.7 %
MAGNESIUM SERPL-MCNC: 1.99 MG/DL (ref 1.6–2.4)
MCH RBC QN AUTO: 27.2 PG (ref 26–34)
MCHC RBC AUTO-ENTMCNC: 31.1 G/DL (ref 32–36)
MCV RBC AUTO: 87 FL (ref 80–100)
MONOCYTES # BLD AUTO: 0.46 X10*3/UL (ref 0.1–1)
MONOCYTES NFR BLD AUTO: 4.7 %
NEUTROPHILS # BLD AUTO: 5.73 X10*3/UL (ref 1.2–7.7)
NEUTROPHILS NFR BLD AUTO: 58 %
NRBC BLD-RTO: 0 /100 WBCS (ref 0–0)
PLATELET # BLD AUTO: 493 X10*3/UL (ref 150–450)
POTASSIUM SERPL-SCNC: 4 MMOL/L (ref 3.5–5.3)
PROT SERPL-MCNC: 8.6 G/DL (ref 6.4–8.2)
RBC # BLD AUTO: 4.16 X10*6/UL (ref 4–5.2)
RH FACTOR (ANTIGEN D): NORMAL
SODIUM SERPL-SCNC: 132 MMOL/L (ref 136–145)
WBC # BLD AUTO: 9.9 X10*3/UL (ref 4.4–11.3)

## 2024-10-10 PROCEDURE — 36415 COLL VENOUS BLD VENIPUNCTURE: CPT | Performed by: NURSE PRACTITIONER

## 2024-10-10 PROCEDURE — 86901 BLOOD TYPING SEROLOGIC RH(D): CPT | Performed by: NURSE PRACTITIONER

## 2024-10-10 PROCEDURE — 2500000004 HC RX 250 GENERAL PHARMACY W/ HCPCS (ALT 636 FOR OP/ED): Performed by: NURSE PRACTITIONER

## 2024-10-10 PROCEDURE — 83690 ASSAY OF LIPASE: CPT | Performed by: NURSE PRACTITIONER

## 2024-10-10 PROCEDURE — 83605 ASSAY OF LACTIC ACID: CPT | Performed by: NURSE PRACTITIONER

## 2024-10-10 PROCEDURE — 2500000004 HC RX 250 GENERAL PHARMACY W/ HCPCS (ALT 636 FOR OP/ED): Performed by: STUDENT IN AN ORGANIZED HEALTH CARE EDUCATION/TRAINING PROGRAM

## 2024-10-10 PROCEDURE — 82435 ASSAY OF BLOOD CHLORIDE: CPT | Performed by: NURSE PRACTITIONER

## 2024-10-10 PROCEDURE — 83735 ASSAY OF MAGNESIUM: CPT | Performed by: NURSE PRACTITIONER

## 2024-10-10 PROCEDURE — 99284 EMERGENCY DEPT VISIT MOD MDM: CPT

## 2024-10-10 PROCEDURE — 84075 ASSAY ALKALINE PHOSPHATASE: CPT | Performed by: NURSE PRACTITIONER

## 2024-10-10 PROCEDURE — 96375 TX/PRO/DX INJ NEW DRUG ADDON: CPT

## 2024-10-10 PROCEDURE — 96374 THER/PROPH/DIAG INJ IV PUSH: CPT

## 2024-10-10 PROCEDURE — 96376 TX/PRO/DX INJ SAME DRUG ADON: CPT

## 2024-10-10 PROCEDURE — 85025 COMPLETE CBC W/AUTO DIFF WBC: CPT | Performed by: NURSE PRACTITIONER

## 2024-10-10 RX ORDER — MORPHINE SULFATE 4 MG/ML
4 INJECTION INTRAVENOUS ONCE
Status: COMPLETED | OUTPATIENT
Start: 2024-10-10 | End: 2024-10-10

## 2024-10-10 RX ORDER — ACETAMINOPHEN 500 MG
1000 TABLET ORAL EVERY 8 HOURS PRN
Qty: 30 TABLET | Refills: 0 | Status: SHIPPED | OUTPATIENT
Start: 2024-10-10 | End: 2024-10-15

## 2024-10-10 RX ORDER — IBUPROFEN 800 MG/1
800 TABLET ORAL 3 TIMES DAILY
Qty: 21 TABLET | Refills: 0 | Status: SHIPPED | OUTPATIENT
Start: 2024-10-10 | End: 2024-10-17

## 2024-10-10 RX ORDER — ONDANSETRON HYDROCHLORIDE 2 MG/ML
4 INJECTION, SOLUTION INTRAVENOUS ONCE
Status: COMPLETED | OUTPATIENT
Start: 2024-10-10 | End: 2024-10-10

## 2024-10-10 RX ADMIN — MORPHINE SULFATE 4 MG: 4 INJECTION INTRAVENOUS at 15:10

## 2024-10-10 RX ADMIN — MORPHINE SULFATE 4 MG: 4 INJECTION, SOLUTION INTRAMUSCULAR; INTRAVENOUS at 13:21

## 2024-10-10 RX ADMIN — ONDANSETRON 4 MG: 2 INJECTION INTRAMUSCULAR; INTRAVENOUS at 13:20

## 2024-10-10 ASSESSMENT — ENCOUNTER SYMPTOMS
CHEST TIGHTNESS: 0
VOMITING: 0
ABDOMINAL DISTENTION: 0
DIARRHEA: 0
CHILLS: 0
PALPITATIONS: 0
DIZZINESS: 0
FEVER: 0
FATIGUE: 0
ABDOMINAL PAIN: 1
NAUSEA: 0
APPETITE CHANGE: 0
LIGHT-HEADEDNESS: 0
COUGH: 0
SHORTNESS OF BREATH: 0
CONSTIPATION: 0
HEADACHES: 0

## 2024-10-10 ASSESSMENT — LIFESTYLE VARIABLES
HAVE YOU EVER FELT YOU SHOULD CUT DOWN ON YOUR DRINKING: NO
EVER HAD A DRINK FIRST THING IN THE MORNING TO STEADY YOUR NERVES TO GET RID OF A HANGOVER: NO
TOTAL SCORE: 0
HAVE PEOPLE ANNOYED YOU BY CRITICIZING YOUR DRINKING: NO
EVER FELT BAD OR GUILTY ABOUT YOUR DRINKING: NO

## 2024-10-10 ASSESSMENT — PAIN SCALES - GENERAL
PAINLEVEL_OUTOF10: 10 - WORST POSSIBLE PAIN
PAINLEVEL_OUTOF10: 10 - WORST POSSIBLE PAIN
PAINLEVEL_OUTOF10: 7
PAINLEVEL_OUTOF10: 10 - WORST POSSIBLE PAIN

## 2024-10-10 ASSESSMENT — PAIN DESCRIPTION - FREQUENCY: FREQUENCY: CONSTANT/CONTINUOUS

## 2024-10-10 ASSESSMENT — PAIN DESCRIPTION - LOCATION
LOCATION: ABDOMEN
LOCATION: ABDOMEN

## 2024-10-10 ASSESSMENT — PAIN - FUNCTIONAL ASSESSMENT: PAIN_FUNCTIONAL_ASSESSMENT: 0-10

## 2024-10-10 ASSESSMENT — PAIN DESCRIPTION - DESCRIPTORS: DESCRIPTORS: SHARP

## 2024-10-10 NOTE — ED PROVIDER NOTES
HPI   Chief Complaint   Patient presents with    Abdominal Pain     Stoma enlarged suddenly causing severe pain       64-year-old female with a past history of large bowel obstruction status post diverting transverse loop colostomy on July 19, 2024 with recent colonoscopy via stoma site and rectum on August 15, 2024 presents the emergency department today for abdominal pain and swelling of her stoma.  Patient states that she has had some generalized abdominal cramping for the past couple of days however a couple hours prior to arrival she noticed some significant swelling and now is having protrusion of the stoma.  She is in severe discomfort and nauseous.  No vomiting.  She has not had any output from the stoma since the swelling.                          Celsa Coma Scale Score: 15                  Patient History   Past Medical History:   Diagnosis Date    Anxiety and depression     Hypothyroidism, unspecified     Ileus, unspecified     SBO (small bowel obstruction) (Multi) 07/18/2024     Past Surgical History:   Procedure Laterality Date    APPENDECTOMY      CHOLECYSTECTOMY      HYSTERECTOMY      OTHER SURGICAL HISTORY  07/19/2024    Exploration Laparotomy, transverse colostomy     Family History   Problem Relation Name Age of Onset    No Known Problems Mother      Heart attack Father      Aneurysm Sister       Social History     Tobacco Use    Smoking status: Every Day     Current packs/day: 2.00     Types: Cigarettes    Smokeless tobacco: Never   Vaping Use    Vaping status: Never Used   Substance Use Topics    Alcohol use: Yes     Comment: 1 A MONTH    Drug use: Never       Physical Exam   ED Triage Vitals [10/10/24 1300]   Temperature Heart Rate Respirations BP   36.9 °C (98.4 °F) (!) 121 (!) 25 (!) 219/159      Pulse Ox Temp Source Heart Rate Source Patient Position   (!) 92 % Temporal -- --      BP Location FiO2 (%)     -- --       Physical Exam  Vitals and nursing note reviewed.   Constitutional:        General: She is not in acute distress.     Appearance: Normal appearance. She is not toxic-appearing.   HENT:      Right Ear: Tympanic membrane normal.      Left Ear: Tympanic membrane normal.      Mouth/Throat:      Mouth: Mucous membranes are moist.      Pharynx: Oropharynx is clear.   Eyes:      Extraocular Movements: Extraocular movements intact.      Pupils: Pupils are equal, round, and reactive to light.   Cardiovascular:      Rate and Rhythm: Normal rate and regular rhythm.      Pulses: Normal pulses.      Heart sounds: Normal heart sounds.   Pulmonary:      Effort: Pulmonary effort is normal.      Breath sounds: Normal breath sounds.   Abdominal:      General: Abdomen is flat. Bowel sounds are normal.      Palpations: Abdomen is soft.      Tenderness: There is abdominal tenderness.      Comments: Prolapse of the ostomy, pink and warm   Musculoskeletal:         General: Normal range of motion.      Cervical back: Normal range of motion and neck supple.   Skin:     General: Skin is warm and dry.      Capillary Refill: Capillary refill takes less than 2 seconds.   Neurological:      General: No focal deficit present.      Mental Status: She is alert and oriented to person, place, and time.   Psychiatric:         Mood and Affect: Mood normal.         Behavior: Behavior normal.         Judgment: Judgment normal.         Labs Reviewed   CBC WITH AUTO DIFFERENTIAL - Abnormal       Result Value    WBC 9.9      nRBC 0.0      RBC 4.16      Hemoglobin 11.3 (*)     Hematocrit 36.3      MCV 87      MCH 27.2      MCHC 31.1 (*)     RDW 17.0 (*)     Platelets 493 (*)     Neutrophils % 58.0      Immature Granulocytes %, Automated 0.4      Lymphocytes % 25.7      Monocytes % 4.7      Eosinophils % 8.8      Basophils % 2.4      Neutrophils Absolute 5.73      Immature Granulocytes Absolute, Automated 0.04      Lymphocytes Absolute 2.54      Monocytes Absolute 0.46      Eosinophils Absolute 0.87 (*)     Basophils Absolute 0.24  (*)    BASIC METABOLIC PANEL - Abnormal    Glucose 104 (*)     Sodium 132 (*)     Potassium 4.0      Chloride 99      Bicarbonate 23      Anion Gap 14      Urea Nitrogen 23      Creatinine 0.81      eGFR 81      Calcium 9.7     HEPATIC FUNCTION PANEL - Abnormal    Albumin 4.8      Bilirubin, Total 0.2      Bilirubin, Direct 0.0      Alkaline Phosphatase 123      ALT 20      AST 21      Total Protein 8.6 (*)    MAGNESIUM - Normal    Magnesium 1.99     LIPASE - Normal    Lipase 22      Narrative:     Venipuncture immediately after or during the administration of Metamizole may lead to falsely low results. Testing should be performed immediately prior to Metamizole dosing.   LACTATE - Normal    Lactate 0.7      Narrative:     Venipuncture immediately after or during the administration of Metamizole may lead to falsely low results. Testing should be performed immediately prior to Metamizole dosing.   TYPE AND SCREEN     Pain Management Panel           No data to display              No orders to display       ED Course & MDM   ED Course as of 10/10/24 1421   Thu Oct 10, 2024   1308 Paged surgical resident at this time [RB]      ED Course User Index  [RB] SANDRINE Pittman       Medical Decision Making  On initially ration patient is found to be tachycardic in a significant amount of pain.  She does have a prolapse of her ostomy.  Did reach out to surgical resident as soon as patient arrived.  Labs were obtained.  She was given morphine and Zofran with improvement in her pain.  5 function, lipase within normal limits hyponatremia at 132 mag and lactic are negative CBC shows no leukocytosis..  Currently awaiting surgical consultation to see the patient.  Her pain has improved on reevaluation.  See attendings note for disposition.        Procedure  Procedures     SANDRINE Pittman  10/10/24 0874

## 2024-10-10 NOTE — CONSULTS
GENERAL SURGERY CONSULTATION NOTE    Barbara Sow   1960   [id number]     Consults    Reason For Consult  Stomal prolapse    History Of Present Illness  Barbara Sow is a 64 y.o. female with history of large bowel obstruction in July 2024 s/p diverting transverse colostomy presenting with stomal prolapse. She states today she was at home and noticed a cramping sensation, shortly after she saw a bulge in her shirt. She thought it was gas from her ostomy but when she looked she noticed bowel prolapsed out. She states other than initial abdominal cramping she does not have much pain. She has been tolerating diet w/o nausea or vomiting. She has been having gas and stool from her ostomy. On 8/15 she had a colonoscopy w/ Dr Valencia and has been following up with her for planned possible reversal. On exam her prolapse is reducible and there are no signs of bowel ischemia, the ostomy is pink and patent.      Past Medical History  Past Medical History:   Diagnosis Date    Anxiety and depression     Hypothyroidism, unspecified     Ileus, unspecified     SBO (small bowel obstruction) (Multi) 07/18/2024       Surgical History  Past Surgical History:   Procedure Laterality Date    APPENDECTOMY      CHOLECYSTECTOMY      HYSTERECTOMY      OTHER SURGICAL HISTORY  07/19/2024    Exploration Laparotomy, transverse colostomy       Medications  No current facility-administered medications on file prior to encounter.     Current Outpatient Medications on File Prior to Encounter   Medication Sig Dispense Refill    acetaminophen (TylenoL) 325 mg tablet Take 1 tablet (325 mg) by mouth 4 times a day as needed for mild pain (1 - 3).      DULoxetine (Cymbalta) 60 mg DR capsule Take 1 capsule (60 mg) by mouth once daily. Do not crush or chew.      gabapentin (Neurontin) 600 mg tablet Take 2 tabs in the morning and 3 tabs at night. 450 tablet 1    levothyroxine (Synthroid, Levoxyl) 100 mcg tablet Take 1 tablet (100 mcg) by  "mouth early in the morning.. Except on Sundays take 2 tabs 90 tablet 1    ondansetron (Zofran) 8 mg tablet Take 1 tablet (8 mg) by mouth every 8 hours if needed for nausea or vomiting for up to 15 doses. 15 tablet 0    polyethylene glycol (Glycolax, Miralax) 17 gram/dose powder Dissolve 17 grams in liquid as directed and take by mouth once daily. 510 g 11       Allergies  Cefaclor, Cefuroxime axetil, and Ketorolac     Social History  She reports that she has been smoking cigarettes. She has never used smokeless tobacco. She reports current alcohol use. She reports that she does not use drugs.    Family History  Family History   Problem Relation Name Age of Onset    No Known Problems Mother      Heart attack Father      Aneurysm Sister          Review of Systems   Constitutional:  Negative for appetite change, chills, fatigue and fever.   Respiratory:  Negative for cough, chest tightness and shortness of breath.    Cardiovascular:  Negative for chest pain and palpitations.   Gastrointestinal:  Positive for abdominal pain. Negative for abdominal distention, constipation, diarrhea, nausea and vomiting.   Neurological:  Negative for dizziness, light-headedness and headaches.       Last Recorded Vitals  Blood pressure (!) 151/96, pulse (!) 103, temperature 36.9 °C (98.4 °F), temperature source Temporal, resp. rate 18, height 1.702 m (5' 7\"), weight 57.2 kg (126 lb), SpO2 96%.     Physical Exam  Constitutional:       General: She is not in acute distress.     Appearance: She is normal weight.   HENT:      Head: Normocephalic and atraumatic.      Mouth/Throat:      Mouth: Mucous membranes are moist.      Pharynx: Oropharynx is clear.   Eyes:      General: No scleral icterus.     Conjunctiva/sclera: Conjunctivae normal.   Cardiovascular:      Rate and Rhythm: Tachycardia present.      Pulses: Normal pulses.   Pulmonary:      Effort: Pulmonary effort is normal. No respiratory distress.   Chest:      Chest wall: No " tenderness.   Abdominal:      Comments: Abdomen soft, mild distension, no guarding or rebound, mild tenderness around stoma site, ostomy pink and patent with stool and gas in the bag, prolapse able to be reduced   Skin:     General: Skin is warm and dry.      Capillary Refill: Capillary refill takes less than 2 seconds.   Neurological:      General: No focal deficit present.      Mental Status: She is alert and oriented to person, place, and time.          Relevant Results:  Labs:  Results for orders placed or performed during the hospital encounter of 10/10/24 (from the past 24 hour(s))   CBC and Auto Differential   Result Value Ref Range    WBC 9.9 4.4 - 11.3 x10*3/uL    nRBC 0.0 0.0 - 0.0 /100 WBCs    RBC 4.16 4.00 - 5.20 x10*6/uL    Hemoglobin 11.3 (L) 12.0 - 16.0 g/dL    Hematocrit 36.3 36.0 - 46.0 %    MCV 87 80 - 100 fL    MCH 27.2 26.0 - 34.0 pg    MCHC 31.1 (L) 32.0 - 36.0 g/dL    RDW 17.0 (H) 11.5 - 14.5 %    Platelets 493 (H) 150 - 450 x10*3/uL    Neutrophils % 58.0 40.0 - 80.0 %    Immature Granulocytes %, Automated 0.4 0.0 - 0.9 %    Lymphocytes % 25.7 13.0 - 44.0 %    Monocytes % 4.7 2.0 - 10.0 %    Eosinophils % 8.8 0.0 - 6.0 %    Basophils % 2.4 0.0 - 2.0 %    Neutrophils Absolute 5.73 1.20 - 7.70 x10*3/uL    Immature Granulocytes Absolute, Automated 0.04 0.00 - 0.70 x10*3/uL    Lymphocytes Absolute 2.54 1.20 - 4.80 x10*3/uL    Monocytes Absolute 0.46 0.10 - 1.00 x10*3/uL    Eosinophils Absolute 0.87 (H) 0.00 - 0.70 x10*3/uL    Basophils Absolute 0.24 (H) 0.00 - 0.10 x10*3/uL   Basic metabolic panel   Result Value Ref Range    Glucose 104 (H) 74 - 99 mg/dL    Sodium 132 (L) 136 - 145 mmol/L    Potassium 4.0 3.5 - 5.3 mmol/L    Chloride 99 98 - 107 mmol/L    Bicarbonate 23 21 - 32 mmol/L    Anion Gap 14 10 - 20 mmol/L    Urea Nitrogen 23 6 - 23 mg/dL    Creatinine 0.81 0.50 - 1.05 mg/dL    eGFR 81 >60 mL/min/1.73m*2    Calcium 9.7 8.6 - 10.3 mg/dL   Magnesium   Result Value Ref Range    Magnesium 1.99  1.60 - 2.40 mg/dL   Hepatic function panel   Result Value Ref Range    Albumin 4.8 3.4 - 5.0 g/dL    Bilirubin, Total 0.2 0.0 - 1.2 mg/dL    Bilirubin, Direct 0.0 0.0 - 0.3 mg/dL    Alkaline Phosphatase 123 33 - 136 U/L    ALT 20 7 - 45 U/L    AST 21 9 - 39 U/L    Total Protein 8.6 (H) 6.4 - 8.2 g/dL   Lipase   Result Value Ref Range    Lipase 22 9 - 82 U/L   Lactate   Result Value Ref Range    Lactate 0.7 0.4 - 2.0 mmol/L   Type and Screen   Result Value Ref Range    ABO TYPE A     Rh TYPE POS     ANTIBODY SCREEN NEG        Imaging:  CT abdomen pelvis w IV contrast  Narrative: Interpreted By:  Jc Chavez,   STUDY:  CT ABDOMEN PELVIS W IV CONTRAST;  8/23/2024 12:18 am      INDICATION:  Signs/Symptoms:Hx ostomy creation 1 month ago, colonoscopy through  stoma and rectally on 8/15, sharp stabbing pain today with some  bleeding from stoma.      COMPARISON:  8/4/2024      ACCESSION NUMBER(S):  [id number]      ORDERING CLINICIAN:  ANA MEDINA      TECHNIQUE:  Contiguous axial images of the abdomen and pelvis were obtained after  the intravenous administration of  contrast. Coronal and sagittal  reformatted images were obtained from the axial images.      FINDINGS:  There is limited evaluation of the lung bases.  Mild basilar atelectasis.  No pleural effusion.      No evidence of liver mass. Postsurgical change of cholecystectomy. No  dilatation common bile duct.      The pancreas, spleen, and adrenal glands appear unremarkable.      Symmetric enhancement of the kidneys. 4 mm and 3 mm nonobstructive  left renal calculi. No hydronephrosis.      There is redemonstration of postsurgical change of transverse  colostomy. The transverse colon is diffusely underdistended and not  well evaluated. There is underdistention versus persistent mild  transverse colonic wall thickening.      Urinary bladder is underdistended and not well evaluated.      No significant free abdominal or pelvic fluid.      No acute fracture of  the lumbar spine.      Impression: Stable postsurgical change of transverse loop colostomy.  Underdistention versus persist wall thickening of the transverse  colon which may be infectious or inflammatory in etiology.      No evidence of bowel obstruction.      MACRO:  None      Signed by: Jc Chavez 8/23/2024 1:20 AM  Dictation workstation:   [id number]      Assessment and Plan  Active Problems:  There are no active Hospital Problems.    64 y.o. female with stomal prolapse  - Stoma prolapse reducible, patient educated on s/s to watch out for and what to do if stoma prolapse returns  - Patient following with Dr. Valencia, recommend she call to have a follow up appointment with her in office  - No acute surgical interventions indicated at this time    Discussed with attending Dr. Lydia Ferrell,  - PGY3  General Surgery

## 2024-10-14 NOTE — PROGRESS NOTES
Barbara Sow is a 64 year old female presents to the office for consult on colostomy takedown.    Presented to ER on July 18, 2024 with abdominal pain and not passing gas or stools.  A CT Abd/Pelvis with contrast revealed dilated stool filled colon with transition point in the region of the splenic flexure, suggesting at least partial obstruction.      Pt had been having cramping abdominal pains.  Normal bowel habit every other day.    Pt had weighed 154 lbs and started noticing weight loss and got down to 130's.  She did develop some constipation.   Weight currently is the same she has, eating well, cramping is gone.  No leaks from the stoma.  She is emptying every 4-5 bowel movements/day.       She is not sleeping well.      7/18/2024 CT Abd/Pelvis with contrast  -Dilated stool-filled colon with transition point in the region of the splenic flexure, suggesting at least partial obstruction. The colon is decompressed distal to this region. There is circumferential bowel wall thickening involving the mid and distal transverse colon, proximally 25 cm in length. Although the appearance favors colitis, colonoscopy is recommended to exclude an underlying mass given the presence of a transition point.  -Nonobstructive left renal calculi.    7/19/2024 Operation:  Exploration Laparotomy, transverse colostomy ([facility])     8/15/2024 Colonoscopy to TI  Findings  The ascending colon appeared normal.  Stricture (traversable after dilation) with length of 1.5 cm and diameter of 3 mm; performed 6 cold forceps biopsies; dilated with 50 mm long balloon dilator from 10 mm starting size to 12 mm end size with step size of 11 mm. Dilation caused improved passage of the scope and improved lumen appearance; tattooed distal to the finding with laila ink  I was able to scope the entire colon distal to the stricture - the prep was excellent in the descending and sigmoid colon.  There were formed stool balls in the transverse  colon between stoma and the stricture and in the rectum and distal sigmoid precluding adequate examination.  We scoped proximal to the stoma, distal to the stoma encountered the stricture, dilated and were able to pass through that to the sigmoid, and then scoped from the anus to the stricture.  A. COLON, TRANSVERSE, BIOPSY:     Colonic mucosa with fibrosis of the lamina propria.  Note: No neoplasia is identified.  No active inflammation is identified on initial and recut levels.     2024 Presented to ER with abdominal pain.  A CT revealed under distention versus persistent wall thickening of the transverse colon.    10/10/2024 Presented to the ER with abdominal pain, nausea  and stoma prolapse.  Prolapse was able to be reduced.     Visit Vitals  /70   Pulse 77   Temp 36.1 °C (97 °F)   Wt 56.9 kg (125 lb 6.4 oz)   SpO2 95%   BMI 19.64 kg/m²   OB Status Hysterectomy   Smoking Status Every Day   BSA 1.64 m²       Past Medical History  Anxiety/Depression  Hypothyroidism  LBO    Surgical History  Scraped ovaries - Appendectomy 14 years   Hysterectomy both ovaries removed   Lx Cholecystectomy  Exp Lap, Colostomy creation     Social History  Smokinppd down to 5-10 cig/day  ETOH:  occasional  Retired construction     Family History:   Dad: CAD   GD: thyroid  Daughter - metabolic syndrome     Review of Systems  Constitutional: Negative for fever, chills, anorexia, weight loss, malaise     ENMT: Negative for nasal discharge, congestion, ear pain, mouth pain, throat pain     Respiratory: Negative for cough, hemoptysis, wheezing, shortness of breath     Cardiac: Negative for chest pain, dyspnea on exertion, orthopnea, palpitations, syncope     Gastrointestinal: Negative for nausea, vomiting, diarrhea, constipation, abdominal pain, (+)s/p COLOSTOMY FOR LBO    Genitourinary: Negative for discharge, dysuria, flank pain, frequency, hematuria     Musculoskeletal: Negative for decreased ROM, pain, swelling,  weakness     Neurological: Negative for dizziness, confusion, headache, seizures, syncope     Psychiatric: Negative for mood changes, anxiety, hallucinations, sleep changes, suicidal ideas, (+)ANXIETY/DEPRESSION     Skin: Negative for mass, pain, itching, rash, ulcer     Endocrine: Negative for heat intolerance, cold intolerance, excessive sweating, polyuria, excess thirst, (+)HYPOTHRYOIDISM     Hematologic/Lymph: Negative for anemia, bruising, easy bleeding, night sweats, petechiae, history of DVT/PE or cancer     Allergic/Immunologic: Negative for anaphylaxis, itchy/ teary eyes, itching, sneezing, swelling    Physical Exam  Constitutional: Well developed, awake/alert/oriented x3, no distress, alert and cooperative             Eyes: Sclera anicteric, no conjunctival inflammation, conjugate gaze    ENMT: mucous membranes moist, no apparent injury,            Head/Neck: Neck supple, no apparent injury, No JVD, trachea midline, no bruits              Respiratory/Thorax: Patent airways, CTAB, normal breath sounds with good chest expansion, thorax symmetric         Cardiovascular: Regular, rate and rhythm, no murmurs, normal S1 and S2         Gastrointestinal: Nondistended, soft, non-tender, no rebound tenderness or guarding, no masses palpable, no organomegaly, +BS, no bruits, Prolapsing stoma (both limbs) in the midline with large hernia.  Soft and reducible.               Extremities: normal extremities, no cyanosis edema, contusions or wounds, 2+ femoral pulses B/L              Neurological: alert and oriented x3, normal strength, Normal gait          Lymphatic: No palpable inguinal lymphadenopathy   Psychological: Appropriate mood and behavior         Skin: Warm and dry, no lesions, no rashes                  Impression:  Pt s/p LBO from splenic flexure stricture prolapse and hernia  Current smoker   Likely hypothyroid    Plan:    Splenic flexure resection and colostomy takedown   Quit smoking wrote for nicotine  patches   Will check the TSH today  Plan for 6 weeks.    Will need PAT as cardiac risk

## 2024-10-23 ENCOUNTER — OFFICE VISIT (OUTPATIENT)
Dept: SURGERY | Facility: CLINIC | Age: 64
End: 2024-10-23
Payer: MEDICARE

## 2024-10-23 ENCOUNTER — LAB (OUTPATIENT)
Dept: LAB | Facility: LAB | Age: 64
End: 2024-10-23
Payer: MEDICARE

## 2024-10-23 VITALS
SYSTOLIC BLOOD PRESSURE: 115 MMHG | WEIGHT: 125.4 LBS | BODY MASS INDEX: 19.64 KG/M2 | HEART RATE: 77 BPM | TEMPERATURE: 97 F | OXYGEN SATURATION: 95 % | DIASTOLIC BLOOD PRESSURE: 70 MMHG

## 2024-10-23 DIAGNOSIS — E03.9 HYPOTHYROIDISM, UNSPECIFIED TYPE: ICD-10-CM

## 2024-10-23 DIAGNOSIS — K56.609 LARGE BOWEL OBSTRUCTION (MULTI): Primary | ICD-10-CM

## 2024-10-23 DIAGNOSIS — M54.16 RIGHT LUMBAR RADICULOPATHY: Primary | ICD-10-CM

## 2024-10-23 LAB — TSH SERPL-ACNC: 4.38 MIU/L (ref 0.44–3.98)

## 2024-10-23 PROCEDURE — 1157F ADVNC CARE PLAN IN RCRD: CPT | Performed by: COLON & RECTAL SURGERY

## 2024-10-23 PROCEDURE — 99214 OFFICE O/P EST MOD 30 MIN: CPT | Performed by: COLON & RECTAL SURGERY

## 2024-10-23 PROCEDURE — 36415 COLL VENOUS BLD VENIPUNCTURE: CPT

## 2024-10-23 PROCEDURE — 84443 ASSAY THYROID STIM HORMONE: CPT

## 2024-10-23 PROCEDURE — 1126F AMNT PAIN NOTED NONE PRSNT: CPT | Performed by: COLON & RECTAL SURGERY

## 2024-10-23 PROCEDURE — 1123F ACP DISCUSS/DSCN MKR DOCD: CPT | Performed by: COLON & RECTAL SURGERY

## 2024-10-23 RX ORDER — NICOTINE 7MG/24HR
1 PATCH, TRANSDERMAL 24 HOURS TRANSDERMAL EVERY 24 HOURS
Qty: 30 PATCH | Refills: 1 | Status: SHIPPED | OUTPATIENT
Start: 2024-10-23 | End: 2024-12-22

## 2024-10-23 RX ORDER — DULOXETIN HYDROCHLORIDE 60 MG/1
60 CAPSULE, DELAYED RELEASE ORAL DAILY
Qty: 30 CAPSULE | Refills: 5 | Status: SHIPPED | OUTPATIENT
Start: 2024-10-23 | End: 2025-10-23

## 2024-10-23 ASSESSMENT — PAIN SCALES - GENERAL: PAINLEVEL_OUTOF10: 0-NO PAIN

## 2024-10-24 ENCOUNTER — PATIENT MESSAGE (OUTPATIENT)
Dept: PRIMARY CARE | Facility: CLINIC | Age: 64
End: 2024-10-24
Payer: MEDICARE

## 2024-10-24 ENCOUNTER — PATIENT OUTREACH (OUTPATIENT)
Dept: PRIMARY CARE | Facility: CLINIC | Age: 64
End: 2024-10-24
Payer: MEDICARE

## 2024-10-24 ENCOUNTER — HOSPITAL ENCOUNTER (OUTPATIENT)
Facility: HOSPITAL | Age: 64
Setting detail: SURGERY ADMIT
End: 2024-10-24
Attending: COLON & RECTAL SURGERY | Admitting: COLON & RECTAL SURGERY
Payer: MEDICARE

## 2024-10-24 PROBLEM — K56.699 COLON STRICTURE (MULTI): Status: ACTIVE | Noted: 2024-10-24

## 2024-10-28 ENCOUNTER — APPOINTMENT (OUTPATIENT)
Dept: PRIMARY CARE | Facility: CLINIC | Age: 64
End: 2024-10-28
Payer: MEDICAID

## 2024-11-25 ENCOUNTER — APPOINTMENT (OUTPATIENT)
Dept: PRIMARY CARE | Facility: CLINIC | Age: 64
End: 2024-11-25
Payer: MEDICARE

## 2024-11-26 DIAGNOSIS — K56.609: Primary | ICD-10-CM

## 2024-11-26 RX ORDER — METRONIDAZOLE 500 MG/100ML
500 INJECTION, SOLUTION INTRAVENOUS ONCE
OUTPATIENT
Start: 2024-11-26 | End: 2024-11-26

## 2024-11-26 RX ORDER — LEVOFLOXACIN 5 MG/ML
500 INJECTION, SOLUTION INTRAVENOUS ONCE
OUTPATIENT
Start: 2024-11-26 | End: 2024-11-26

## 2024-11-26 RX ORDER — HEPARIN SODIUM 5000 [USP'U]/ML
5000 INJECTION, SOLUTION INTRAVENOUS; SUBCUTANEOUS ONCE
OUTPATIENT
Start: 2024-11-26 | End: 2024-11-26

## 2024-12-03 ENCOUNTER — CLINICAL SUPPORT (OUTPATIENT)
Dept: PREADMISSION TESTING | Facility: HOSPITAL | Age: 64
End: 2024-12-03
Payer: MEDICARE

## 2024-12-03 DIAGNOSIS — K56.699 COLON STRICTURE (MULTI): ICD-10-CM

## 2024-12-05 ENCOUNTER — TELEPHONE (OUTPATIENT)
Dept: PREADMISSION TESTING | Facility: HOSPITAL | Age: 64
End: 2024-12-05
Payer: MEDICARE

## 2024-12-19 ENCOUNTER — APPOINTMENT (OUTPATIENT)
Dept: RADIOLOGY | Facility: HOSPITAL | Age: 64
End: 2024-12-19
Payer: MEDICARE

## 2024-12-19 ENCOUNTER — HOSPITAL ENCOUNTER (EMERGENCY)
Facility: HOSPITAL | Age: 64
Discharge: HOME | End: 2024-12-19
Attending: STUDENT IN AN ORGANIZED HEALTH CARE EDUCATION/TRAINING PROGRAM
Payer: MEDICARE

## 2024-12-19 VITALS
DIASTOLIC BLOOD PRESSURE: 80 MMHG | OXYGEN SATURATION: 93 % | BODY MASS INDEX: 20.09 KG/M2 | WEIGHT: 125 LBS | TEMPERATURE: 98.4 F | HEART RATE: 113 BPM | RESPIRATION RATE: 20 BRPM | HEIGHT: 66 IN | SYSTOLIC BLOOD PRESSURE: 127 MMHG

## 2024-12-19 DIAGNOSIS — K94.09 COLOSTOMY PROLAPSE (MULTI): Primary | ICD-10-CM

## 2024-12-19 LAB
ALBUMIN SERPL BCP-MCNC: 3.5 G/DL (ref 3.4–5)
ALP SERPL-CCNC: 120 U/L (ref 33–136)
ALT SERPL W P-5'-P-CCNC: 5 U/L (ref 7–45)
ANION GAP SERPL CALC-SCNC: 13 MMOL/L (ref 10–20)
AST SERPL W P-5'-P-CCNC: 10 U/L (ref 9–39)
BASOPHILS # BLD AUTO: 0.15 X10*3/UL (ref 0–0.1)
BASOPHILS NFR BLD AUTO: 1.7 %
BILIRUB SERPL-MCNC: 0.2 MG/DL (ref 0–1.2)
BUN SERPL-MCNC: 11 MG/DL (ref 6–23)
CALCIUM SERPL-MCNC: 8.4 MG/DL (ref 8.6–10.3)
CHLORIDE SERPL-SCNC: 108 MMOL/L (ref 98–107)
CO2 SERPL-SCNC: 24 MMOL/L (ref 21–32)
CREAT SERPL-MCNC: 0.48 MG/DL (ref 0.5–1.05)
EGFRCR SERPLBLD CKD-EPI 2021: >90 ML/MIN/1.73M*2
EOSINOPHIL # BLD AUTO: 0.73 X10*3/UL (ref 0–0.7)
EOSINOPHIL NFR BLD AUTO: 8.4 %
ERYTHROCYTE [DISTWIDTH] IN BLOOD BY AUTOMATED COUNT: 17.3 % (ref 11.5–14.5)
GLUCOSE SERPL-MCNC: 84 MG/DL (ref 74–99)
HCT VFR BLD AUTO: 32.9 % (ref 36–46)
HGB BLD-MCNC: 10.1 G/DL (ref 12–16)
IMM GRANULOCYTES # BLD AUTO: 0.03 X10*3/UL (ref 0–0.7)
IMM GRANULOCYTES NFR BLD AUTO: 0.3 % (ref 0–0.9)
LACTATE SERPL-SCNC: 1.2 MMOL/L (ref 0.4–2)
LIPASE SERPL-CCNC: 21 U/L (ref 9–82)
LYMPHOCYTES # BLD AUTO: 1.98 X10*3/UL (ref 1.2–4.8)
LYMPHOCYTES NFR BLD AUTO: 22.8 %
MCH RBC QN AUTO: 25.9 PG (ref 26–34)
MCHC RBC AUTO-ENTMCNC: 30.7 G/DL (ref 32–36)
MCV RBC AUTO: 84 FL (ref 80–100)
MONOCYTES # BLD AUTO: 0.63 X10*3/UL (ref 0.1–1)
MONOCYTES NFR BLD AUTO: 7.2 %
NEUTROPHILS # BLD AUTO: 5.18 X10*3/UL (ref 1.2–7.7)
NEUTROPHILS NFR BLD AUTO: 59.6 %
NRBC BLD-RTO: 0 /100 WBCS (ref 0–0)
PLATELET # BLD AUTO: 625 X10*3/UL (ref 150–450)
POTASSIUM SERPL-SCNC: 3.4 MMOL/L (ref 3.5–5.3)
PROT SERPL-MCNC: 6.7 G/DL (ref 6.4–8.2)
RBC # BLD AUTO: 3.9 X10*6/UL (ref 4–5.2)
SODIUM SERPL-SCNC: 142 MMOL/L (ref 136–145)
WBC # BLD AUTO: 8.7 X10*3/UL (ref 4.4–11.3)

## 2024-12-19 PROCEDURE — 96376 TX/PRO/DX INJ SAME DRUG ADON: CPT

## 2024-12-19 PROCEDURE — 96375 TX/PRO/DX INJ NEW DRUG ADDON: CPT

## 2024-12-19 PROCEDURE — 83690 ASSAY OF LIPASE: CPT | Performed by: STUDENT IN AN ORGANIZED HEALTH CARE EDUCATION/TRAINING PROGRAM

## 2024-12-19 PROCEDURE — 2550000001 HC RX 255 CONTRASTS: Performed by: STUDENT IN AN ORGANIZED HEALTH CARE EDUCATION/TRAINING PROGRAM

## 2024-12-19 PROCEDURE — 99285 EMERGENCY DEPT VISIT HI MDM: CPT | Mod: 25 | Performed by: STUDENT IN AN ORGANIZED HEALTH CARE EDUCATION/TRAINING PROGRAM

## 2024-12-19 PROCEDURE — 2500000001 HC RX 250 WO HCPCS SELF ADMINISTERED DRUGS (ALT 637 FOR MEDICARE OP): Performed by: STUDENT IN AN ORGANIZED HEALTH CARE EDUCATION/TRAINING PROGRAM

## 2024-12-19 PROCEDURE — 74177 CT ABD & PELVIS W/CONTRAST: CPT

## 2024-12-19 PROCEDURE — 83605 ASSAY OF LACTIC ACID: CPT | Performed by: STUDENT IN AN ORGANIZED HEALTH CARE EDUCATION/TRAINING PROGRAM

## 2024-12-19 PROCEDURE — 96374 THER/PROPH/DIAG INJ IV PUSH: CPT | Mod: 59

## 2024-12-19 PROCEDURE — 84075 ASSAY ALKALINE PHOSPHATASE: CPT | Performed by: STUDENT IN AN ORGANIZED HEALTH CARE EDUCATION/TRAINING PROGRAM

## 2024-12-19 PROCEDURE — 2500000004 HC RX 250 GENERAL PHARMACY W/ HCPCS (ALT 636 FOR OP/ED)

## 2024-12-19 PROCEDURE — 74177 CT ABD & PELVIS W/CONTRAST: CPT | Performed by: RADIOLOGY

## 2024-12-19 PROCEDURE — 36415 COLL VENOUS BLD VENIPUNCTURE: CPT | Performed by: STUDENT IN AN ORGANIZED HEALTH CARE EDUCATION/TRAINING PROGRAM

## 2024-12-19 PROCEDURE — 85025 COMPLETE CBC W/AUTO DIFF WBC: CPT | Performed by: STUDENT IN AN ORGANIZED HEALTH CARE EDUCATION/TRAINING PROGRAM

## 2024-12-19 PROCEDURE — 2500000004 HC RX 250 GENERAL PHARMACY W/ HCPCS (ALT 636 FOR OP/ED): Performed by: STUDENT IN AN ORGANIZED HEALTH CARE EDUCATION/TRAINING PROGRAM

## 2024-12-19 PROCEDURE — 96361 HYDRATE IV INFUSION ADD-ON: CPT

## 2024-12-19 RX ORDER — ONDANSETRON HYDROCHLORIDE 2 MG/ML
4 INJECTION, SOLUTION INTRAVENOUS ONCE
Status: COMPLETED | OUTPATIENT
Start: 2024-12-19 | End: 2024-12-19

## 2024-12-19 RX ORDER — HYDROMORPHONE HYDROCHLORIDE 1 MG/ML
1 INJECTION, SOLUTION INTRAMUSCULAR; INTRAVENOUS; SUBCUTANEOUS ONCE
Status: COMPLETED | OUTPATIENT
Start: 2024-12-19 | End: 2024-12-19

## 2024-12-19 RX ORDER — OXYCODONE HYDROCHLORIDE 5 MG/1
5 TABLET ORAL ONCE
Status: COMPLETED | OUTPATIENT
Start: 2024-12-19 | End: 2024-12-19

## 2024-12-19 RX ORDER — HYDROMORPHONE HYDROCHLORIDE 2 MG/ML
2 INJECTION, SOLUTION INTRAMUSCULAR; INTRAVENOUS; SUBCUTANEOUS ONCE
Status: COMPLETED | OUTPATIENT
Start: 2024-12-19 | End: 2024-12-19

## 2024-12-19 RX ORDER — MORPHINE SULFATE 4 MG/ML
4 INJECTION INTRAVENOUS ONCE
Status: COMPLETED | OUTPATIENT
Start: 2024-12-19 | End: 2024-12-19

## 2024-12-19 RX ORDER — HYDROMORPHONE HYDROCHLORIDE 1 MG/ML
INJECTION, SOLUTION INTRAMUSCULAR; INTRAVENOUS; SUBCUTANEOUS
Status: COMPLETED
Start: 2024-12-19 | End: 2024-12-19

## 2024-12-19 ASSESSMENT — PAIN DESCRIPTION - ORIENTATION: ORIENTATION: MID

## 2024-12-19 ASSESSMENT — LIFESTYLE VARIABLES
HAVE PEOPLE ANNOYED YOU BY CRITICIZING YOUR DRINKING: NO
HAVE YOU EVER FELT YOU SHOULD CUT DOWN ON YOUR DRINKING: NO
EVER HAD A DRINK FIRST THING IN THE MORNING TO STEADY YOUR NERVES TO GET RID OF A HANGOVER: NO

## 2024-12-19 ASSESSMENT — PAIN SCALES - GENERAL
PAINLEVEL_OUTOF10: 10 - WORST POSSIBLE PAIN
PAINLEVEL_OUTOF10: 10 - WORST POSSIBLE PAIN
PAINLEVEL_OUTOF10: 0 - NO PAIN
PAINLEVEL_OUTOF10: 10 - WORST POSSIBLE PAIN
PAINLEVEL_OUTOF10: 8
PAINLEVEL_OUTOF10: 10 - WORST POSSIBLE PAIN

## 2024-12-19 ASSESSMENT — COLUMBIA-SUICIDE SEVERITY RATING SCALE - C-SSRS
6. HAVE YOU EVER DONE ANYTHING, STARTED TO DO ANYTHING, OR PREPARED TO DO ANYTHING TO END YOUR LIFE?: NO
2. HAVE YOU ACTUALLY HAD ANY THOUGHTS OF KILLING YOURSELF?: NO
1. IN THE PAST MONTH, HAVE YOU WISHED YOU WERE DEAD OR WISHED YOU COULD GO TO SLEEP AND NOT WAKE UP?: NO

## 2024-12-19 ASSESSMENT — PAIN DESCRIPTION - DESCRIPTORS: DESCRIPTORS: STABBING

## 2024-12-19 ASSESSMENT — PAIN - FUNCTIONAL ASSESSMENT: PAIN_FUNCTIONAL_ASSESSMENT: 0-10

## 2024-12-19 ASSESSMENT — PAIN DESCRIPTION - LOCATION: LOCATION: ABDOMEN

## 2024-12-19 ASSESSMENT — PAIN DESCRIPTION - PAIN TYPE: TYPE: ACUTE PAIN

## 2024-12-19 ASSESSMENT — PAIN SCALES - PAIN ASSESSMENT IN ADVANCED DEMENTIA (PAINAD): TOTALSCORE: MEDICATION (SEE MAR)

## 2024-12-19 NOTE — ED TRIAGE NOTES
Pt. Arrived to the ED via private car for c/o stoma pain. Pt.. had an abdominal stoma placed back in July and was suppose to have a reversal on 12/9 and the pt. Missed her appt. Pt.states the pain is central to her stoma and has been consistent since yesterday. Pt. States that she has also been nauseous and vomiting the last week

## 2024-12-19 NOTE — ED PROVIDER NOTES
HPI   Chief Complaint   Patient presents with    Abdominal Pain       64-year-old female with past medical history of large bowel obstruction with subsequent partial bowel resection with colostomy presents to ED with concerns for abdominal pain.  Patient says that she started having herniation of her bowel into her colostomy for the past week.  She says has happened intermittently before but not to this severity.  Is having some nausea and vomiting with it.  No fever.  She was supposed to have the ostomy reversed about 10 days ago however she missed her appointment to have this done.              Patient History   Past Medical History:   Diagnosis Date    Anxiety and depression     Colon stricture (Multi)     Plan: Laparoscopic Takedown of Colostomy; Splenic Flexure Resection with Anastomosis; Closure Colostomy 24    Hypothyroidism     Ileus (Multi)     Large bowel obstruction (Multi)     s/p Exploration Laparotomy, transverse colostomy 24    OA (osteoarthritis)      Past Surgical History:   Procedure Laterality Date    APPENDECTOMY      CHOLECYSTECTOMY      COLONOSCOPY  08/15/2024    COLOSTOMY  2024    Exploration Laparotomy, transverse colostomy    HYSTERECTOMY       Family History   Problem Relation Name Age of Onset    No Known Problems Mother      Heart attack Father      Aneurysm Sister       Social History     Tobacco Use    Smoking status: Former     Current packs/day: 0.00     Average packs/day: 2.0 packs/day for 44.7 years (89.3 ttl pk-yrs)     Types: Cigarettes     Start date:      Quit date: 2024     Years since quittin.2    Smokeless tobacco: Never   Vaping Use    Vaping status: Never Used   Substance Use Topics    Alcohol use: Not Currently    Drug use: Never       Physical Exam   ED Triage Vitals   Temperature Heart Rate Respirations BP   24 0836 24 0836 24 0836 24 0836   36.9 °C (98.4 °F) 95 18 126/68      Pulse Ox Temp src Heart Rate Source Patient  Position   12/19/24 0836 -- 12/19/24 0853 12/19/24 0853   100 %  Monitor Lying      BP Location FiO2 (%)     12/19/24 0853 --     Left arm        Physical Exam  Vitals and nursing note reviewed.   Constitutional:       General: She is not in acute distress.     Appearance: She is well-developed.   HENT:      Head: Normocephalic and atraumatic.   Eyes:      Conjunctiva/sclera: Conjunctivae normal.   Cardiovascular:      Rate and Rhythm: Normal rate and regular rhythm.      Heart sounds: No murmur heard.  Pulmonary:      Effort: Pulmonary effort is normal. No respiratory distress.      Breath sounds: Normal breath sounds.   Abdominal:      Palpations: Abdomen is soft.      Tenderness: There is generalized abdominal tenderness. There is guarding. There is no rebound.      Comments: There is herniation of the bowel into the ostomy bag.  No bleeding.   Musculoskeletal:         General: No swelling.      Cervical back: Neck supple.   Skin:     General: Skin is warm and dry.      Capillary Refill: Capillary refill takes less than 2 seconds.   Neurological:      Mental Status: She is alert.   Psychiatric:         Mood and Affect: Mood normal.           ED Course & MDM   Diagnoses as of 12/29/24 0658   Colostomy prolapse (Multi)                 No data recorded     [place] Coma Scale Score: 15 (12/19/24 0852 : Catalina Landaverde RN)                           Medical Decision Making  HISTORIAN:  Patient    CHART REVIEW:  No pertinent findings    PT SUMMARY:  64-year-old female presented ED with abdominal pain and peristomal hernia.  Vital signs stable.    DDX:  Parastomal hernia, bowel obstruction, bowel necrosis, sepsis    PLAN:  Will obtain CBC, CMP, lipase, lactate, CT abdomen pelvis    DISPO/RE-EVAL:  Patient's labs show no acute abnormalities.  CT imaging shows a significant herniation of bowel and mesentery through the stoma.  I consulted general surgery come evaluate the patient.  They recommend me reaching out to  Dr. Valencia of colorectal surgery.  She recommends bedside attempted hernia reduction.  Surgery team here tried to reduce it however when unsuccessful.  Our general surgery team will now reach out to the colorectal surgeon in order to come up with further plan for the patient.  At this time patient presented to oncoming provider awaiting finalization of plan for patient.          Procedure  Procedures     Topher Malcolm, DO  12/19/24 1601       Topher Malcolm, DO  12/29/24 0658

## 2024-12-19 NOTE — CONSULTS
GENERAL SURGERY CONSULTATION NOTE    Barbara Sow   1960   [id number]     Consults    Reason For Consult  Stomal prolapse    History Of Present Illness  Barbara Sow is a 64 y.o. female with history of large bowel obstruction in July 2024 s/p diverting transverse colostomy presenting with stomal prolapse. She has been following with colorectal for repair and was supposed to have surgery on 12/9 but cancelled due to family issues. She presents today with worsening abdominal pain and stomal prolapse     Past Medical History  Past Medical History:   Diagnosis Date    Anxiety and depression     Colon stricture (Multi)     Plan: Laparoscopic Takedown of Colostomy; Splenic Flexure Resection with Anastomosis; Closure Colostomy 12/9/24    Hypothyroidism     Ileus (Multi)     Large bowel obstruction (Multi)     s/p Exploration Laparotomy, transverse colostomy 7/19/24    OA (osteoarthritis)        Surgical History  Past Surgical History:   Procedure Laterality Date    APPENDECTOMY      CHOLECYSTECTOMY      COLONOSCOPY  08/15/2024    COLOSTOMY  07/19/2024    Exploration Laparotomy, transverse colostomy    HYSTERECTOMY         Medications  No current facility-administered medications on file prior to encounter.     Current Outpatient Medications on File Prior to Encounter   Medication Sig Dispense Refill    DULoxetine (Cymbalta) 60 mg DR capsule Take 1 capsule (60 mg) by mouth once daily. Do not crush or chew. 30 capsule 5    gabapentin (Neurontin) 600 mg tablet Take 2 tabs in the morning and 3 tabs at night. 450 tablet 1    levothyroxine (Synthroid, Levoxyl) 100 mcg tablet Take 1 tablet (100 mcg) by mouth early in the morning.. Except on Sundays take 2 tabs 90 tablet 1    nicotine (Nicoderm CQ) 7 mg/24 hr patch Place 1 patch over 24 hours on the skin once every 24 hours. 30 patch 1    ondansetron (Zofran) 8 mg tablet Take 1 tablet (8 mg) by mouth every 8 hours if needed for nausea or vomiting for up to 15  "doses. 15 tablet 0    polyethylene glycol (Glycolax, Miralax) 17 gram/dose powder Dissolve 17 grams in liquid as directed and take by mouth once daily. 510 g 11       Allergies  Cefaclor, Cefuroxime axetil, and Ketorolac     Social History  She reports that she quit smoking about 3 months ago. Her smoking use included cigarettes. She started smoking about 44 years ago. She has a 89.3 pack-year smoking history. She has never used smokeless tobacco. She reports that she does not currently use alcohol. She reports that she does not use drugs.    Family History  Family History   Problem Relation Name Age of Onset    No Known Problems Mother      Heart attack Father      Aneurysm Sister          Review of Systems   Constitutional:  Negative for appetite change, chills, fatigue and fever.   Respiratory:  Negative for cough, chest tightness and shortness of breath.    Cardiovascular:  Negative for chest pain and palpitations.   Gastrointestinal:  Positive for abdominal pain. Negative for abdominal distention, constipation, diarrhea, nausea and vomiting.   Neurological:  Negative for dizziness, light-headedness and headaches.     Last Recorded Vitals  Blood pressure 126/68, pulse 75, temperature 36.9 °C (98.4 °F), resp. rate 19, height 1.676 m (5' 6\"), weight 56.7 kg (125 lb), SpO2 (!) 90%.    Physical Exam  Constitutional:       General: She is not in acute distress.     Appearance: She is normal weight.   HENT:      Head: Normocephalic and atraumatic.      Mouth/Throat:      Mouth: Mucous membranes are moist.      Pharynx: Oropharynx is clear.   Eyes:      General: No scleral icterus.     Conjunctiva/sclera: Conjunctivae normal.   Cardiovascular:      Rate and Rhythm: Tachycardia present.      Pulses: Normal pulses.   Pulmonary:      Effort: Pulmonary effort is normal. No respiratory distress.   Chest:      Chest wall: No tenderness.   Abdominal:      Comments: Abdomen soft, mild distension, no guarding or rebound, " tenderness around stoma site, ostomy pink and patent, large stomal prolapse with edematous bowl, able to be reduced  Skin:     General: Skin is warm and dry.      Capillary Refill: Capillary refill takes less than 2 seconds.   Neurological:      General: No focal deficit present.      Mental Status: She is alert and oriented to person, place, and time.      Relevant Results:  Labs:  Results for orders placed or performed during the hospital encounter of 12/19/24 (from the past 24 hours)   CBC and Auto Differential   Result Value Ref Range    WBC 8.7 4.4 - 11.3 x10*3/uL    nRBC 0.0 0.0 - 0.0 /100 WBCs    RBC 3.90 (L) 4.00 - 5.20 x10*6/uL    Hemoglobin 10.1 (L) 12.0 - 16.0 g/dL    Hematocrit 32.9 (L) 36.0 - 46.0 %    MCV 84 80 - 100 fL    MCH 25.9 (L) 26.0 - 34.0 pg    MCHC 30.7 (L) 32.0 - 36.0 g/dL    RDW 17.3 (H) 11.5 - 14.5 %    Platelets 625 (H) 150 - 450 x10*3/uL    Neutrophils % 59.6 40.0 - 80.0 %    Immature Granulocytes %, Automated 0.3 0.0 - 0.9 %    Lymphocytes % 22.8 13.0 - 44.0 %    Monocytes % 7.2 2.0 - 10.0 %    Eosinophils % 8.4 0.0 - 6.0 %    Basophils % 1.7 0.0 - 2.0 %    Neutrophils Absolute 5.18 1.20 - 7.70 x10*3/uL    Immature Granulocytes Absolute, Automated 0.03 0.00 - 0.70 x10*3/uL    Lymphocytes Absolute 1.98 1.20 - 4.80 x10*3/uL    Monocytes Absolute 0.63 0.10 - 1.00 x10*3/uL    Eosinophils Absolute 0.73 (H) 0.00 - 0.70 x10*3/uL    Basophils Absolute 0.15 (H) 0.00 - 0.10 x10*3/uL   Comprehensive metabolic panel   Result Value Ref Range    Glucose 84 74 - 99 mg/dL    Sodium 142 136 - 145 mmol/L    Potassium 3.4 (L) 3.5 - 5.3 mmol/L    Chloride 108 (H) 98 - 107 mmol/L    Bicarbonate 24 21 - 32 mmol/L    Anion Gap 13 10 - 20 mmol/L    Urea Nitrogen 11 6 - 23 mg/dL    Creatinine 0.48 (L) 0.50 - 1.05 mg/dL    eGFR >90 >60 mL/min/1.73m*2    Calcium 8.4 (L) 8.6 - 10.3 mg/dL    Albumin 3.5 3.4 - 5.0 g/dL    Alkaline Phosphatase 120 33 - 136 U/L    Total Protein 6.7 6.4 - 8.2 g/dL    AST 10 9 - 39 U/L     Bilirubin, Total 0.2 0.0 - 1.2 mg/dL    ALT 5 (L) 7 - 45 U/L   Lipase   Result Value Ref Range    Lipase 21 9 - 82 U/L   Lactate   Result Value Ref Range    Lactate 1.2 0.4 - 2.0 mmol/L       Imaging:  CT abdomen pelvis w IV contrast  Narrative: Interpreted By:  Janak Hinojosa,   STUDY:  CT ABDOMEN PELVIS W IV CONTRAST; 12/19/2024 11:27 am      INDICATION:  Signs/Symptoms:abd pain, hernia into colostomy;      COMPARISON:  08/23/2020      ACCESSION NUMBER(S):  [id number]      ORDERING CLINICIAN:  GURINDER DOAN      TECHNIQUE:  Contiguous axial images of the abdomen/pelvis were performed with IV  contrast. 75 ml of Omnipaque 350 was utilized. Coronal and sagittal  reformatted images were also obtained. All CT examinations are  performed with 1 or more of the following dose reduction techniques:  Automated exposure control, adjustment of mA and/or kv according to  patient's size, or use of iterative reconstruction techniques.          FINDINGS:  The liver, common bile duct, pancreas, spleen, and adrenal glands are  unremarkable. Prior cholecystectomy with surgical clips in the  gallbladder fossa are again noted.      Motion artifact somewhat limits sensitivity of evaluation.      The kidneys enhance symmetrically. No urolithiasis is seen. No  hydroureteronephrosis is seen.      The visualized aorta is unremarkable.      The small bowel is not dilated. There is interval development of a  large parastomal herniation with wide neck along the midline of the  lower abdomen and colostomy site. No evidence for strangulation. The  large ventral herniation contains predominantly mesentery as well as  a loop of noninflamed and nondilated small bowel. No evidence for  incarceration or obstruction.      There is a large amount of stool within the cecum and ascending colon.      No free intraperitoneal air. No significant free intraperitoneal  fluid is seen.      The bladder is well distended with no gross wall thickening.       The visualized osseous structures are intact.      Limited images of the lower thorax are unremarkable.      Impression: 1. Interval development of a large parastomal herniation with wide  neck along the midline of the lower abdomen and colostomy site.  Parastomal hernia sac contains predominantly mesentery although there  is a loop of small bowel within the hernia sac as well. The contain  loop of small bowel shows no evidence of abnormality or abnormal  dilatation.      2. Large amount of stool within the cecum and ascending colon.      Signed by: Janak Hinojosa 12/19/2024 12:30 PM  Dictation workstation:   [id number]      Assessment and Plan  Assessment & Plan    64 y.o. female with stomal prolapse  - Stoma prolapse reducible, patient educated on s/s to watch out for and what to do if stoma prolapse returns, patient given abdominal binder  - Patient following with Dr. Valencia, Her office will call the patient in the coming days, Dr. Valencia aware of ED visit  - No acute surgical interventions indicated at this time  - Patient educated to stop smoking     Discussed with attending Dr. Burak Ferrell, DO - PGY3  General Surgery

## 2024-12-20 NOTE — PROGRESS NOTES
I have accept care of this patient in signout.    In summary:  I received patient in signout in summary this is a 64-year-old female ostomy presenting with department for ostomy prolapse.  CT does confirm ostomy prolapse.  Surgery was consulted and they also spoke with her colorectal surgeon.  Surgery was able to reduce and confirm a colon surgery the patient was safe for discharge home.  Patient is no longer requiring oxygen with the patient requires oxygen for the Dilaudid.  She does appear more comfortable abdominal binder was applied she will get a call from her colorectal's office tomorrow and she verbalizes any return precautions.      Labs Reviewed   CBC WITH AUTO DIFFERENTIAL - Abnormal       Result Value    WBC 8.7      nRBC 0.0      RBC 3.90 (*)     Hemoglobin 10.1 (*)     Hematocrit 32.9 (*)     MCV 84      MCH 25.9 (*)     MCHC 30.7 (*)     RDW 17.3 (*)     Platelets 625 (*)     Neutrophils % 59.6      Immature Granulocytes %, Automated 0.3      Lymphocytes % 22.8      Monocytes % 7.2      Eosinophils % 8.4      Basophils % 1.7      Neutrophils Absolute 5.18      Immature Granulocytes Absolute, Automated 0.03      Lymphocytes Absolute 1.98      Monocytes Absolute 0.63      Eosinophils Absolute 0.73 (*)     Basophils Absolute 0.15 (*)    COMPREHENSIVE METABOLIC PANEL - Abnormal    Glucose 84      Sodium 142      Potassium 3.4 (*)     Chloride 108 (*)     Bicarbonate 24      Anion Gap 13      Urea Nitrogen 11      Creatinine 0.48 (*)     eGFR >90      Calcium 8.4 (*)     Albumin 3.5      Alkaline Phosphatase 120      Total Protein 6.7      AST 10      Bilirubin, Total 0.2      ALT 5 (*)    LIPASE - Normal    Lipase 21      Narrative:     Venipuncture immediately after or during the administration of Metamizole may lead to falsely low results. Testing should be performed immediately prior to Metamizole dosing.   LACTATE - Normal    Lactate 1.2      Narrative:     Venipuncture immediately after or during the  administration of Metamizole may lead to falsely low results. Testing should be performed immediately prior to Metamizole dosing.     CT abdomen pelvis w IV contrast   Final Result   1. Interval development of a large parastomal herniation with wide   neck along the midline of the lower abdomen and colostomy site.   Parastomal hernia sac contains predominantly mesentery although there   is a loop of small bowel within the hernia sac as well. The contain   loop of small bowel shows no evidence of abnormality or abnormal   dilatation.        2. Large amount of stool within the cecum and ascending colon.        Signed by: Janak Hinojosa 12/19/2024 12:30 PM   Dictation workstation:   [id number]

## 2024-12-20 NOTE — DISCHARGE INSTRUCTIONS
[facility] office will call you tomorrow.  Please return if it does prolapse again or if your pain worsens.  Please wear the abdominal binder at all times.

## 2025-01-08 ENCOUNTER — APPOINTMENT (OUTPATIENT)
Dept: PRIMARY CARE | Facility: CLINIC | Age: 65
End: 2025-01-08
Payer: MEDICARE

## 2025-01-13 DIAGNOSIS — M79.2 NEUROPATHIC PAIN: ICD-10-CM

## 2025-01-13 DIAGNOSIS — E03.9 ACQUIRED HYPOTHYROIDISM: ICD-10-CM

## 2025-01-13 RX ORDER — LEVOTHYROXINE SODIUM 100 UG/1
100 TABLET ORAL DAILY
Qty: 90 TABLET | Refills: 0 | Status: SHIPPED | OUTPATIENT
Start: 2025-01-13 | End: 2025-07-12

## 2025-01-13 RX ORDER — GABAPENTIN 600 MG/1
TABLET ORAL
Qty: 450 TABLET | Refills: 3 | Status: SHIPPED | OUTPATIENT
Start: 2025-01-13

## 2025-01-28 ENCOUNTER — APPOINTMENT (OUTPATIENT)
Dept: PRIMARY CARE | Facility: CLINIC | Age: 65
End: 2025-01-28
Payer: MEDICARE

## 2025-02-03 ENCOUNTER — HOSPITAL ENCOUNTER (EMERGENCY)
Facility: HOSPITAL | Age: 65
Discharge: HOME | End: 2025-02-03
Payer: MEDICARE

## 2025-02-03 PROCEDURE — 4500999001 HC ED NO CHARGE

## 2025-02-03 PROCEDURE — 99283 EMERGENCY DEPT VISIT LOW MDM: CPT

## 2025-02-21 ENCOUNTER — TELEPHONE (OUTPATIENT)
Dept: SURGERY | Facility: CLINIC | Age: 65
End: 2025-02-21
Payer: MEDICARE

## 2025-02-21 NOTE — TELEPHONE ENCOUNTER
----- Message from Monet ITA sent at 2/21/2025 12:51 PM EST -----  Reported to patient daughter gave her dr. Galdamez information  ----- Message -----  From: Randi Sumner MD  Sent: 2/20/2025   4:20 PM EST  To: Monet Neves CMA    Agree with Dr. Gentile. She needs colorectal surgery.  ----- Message -----  From: Mis Gentile MD  Sent: 2/20/2025   3:22 PM EST  To: Randi Sumner MD; Monet Neves CMA    She should see Colorectal - another provider if she doesn't like Dr. Valencia. She has a colostomy that can be taken down AND a stricture that may need additional surgery.  ----- Message -----  From: Monet Neves CMA  Sent: 2/20/2025   2:13 PM EST  To: Randi Sumner MD; Mis Gentile MD    Patient called asking for Dr. Freeman to discuss colostomy takedown.  I informed her he is no longer here.  I told her I would have you both review her chart to see if we could still help her.  She did see dr. Valencia but is not happy with her.

## 2025-03-03 DIAGNOSIS — Z93.3 COLOSTOMY IN PLACE (MULTI): Primary | ICD-10-CM

## 2025-03-03 DIAGNOSIS — K56.609 LARGE BOWEL OBSTRUCTION (MULTI): ICD-10-CM

## 2025-03-07 PROCEDURE — 99283 EMERGENCY DEPT VISIT LOW MDM: CPT | Performed by: EMERGENCY MEDICINE

## 2025-03-07 ASSESSMENT — PAIN - FUNCTIONAL ASSESSMENT: PAIN_FUNCTIONAL_ASSESSMENT: 0-10

## 2025-03-07 ASSESSMENT — PAIN SCALES - GENERAL: PAINLEVEL_OUTOF10: 8

## 2025-03-07 ASSESSMENT — PAIN DESCRIPTION - PAIN TYPE: TYPE: ACUTE PAIN

## 2025-03-08 ENCOUNTER — HOSPITAL ENCOUNTER (EMERGENCY)
Facility: HOSPITAL | Age: 65
Discharge: HOME | End: 2025-03-08
Attending: EMERGENCY MEDICINE
Payer: MEDICARE

## 2025-03-08 VITALS
OXYGEN SATURATION: 97 % | TEMPERATURE: 97.1 F | DIASTOLIC BLOOD PRESSURE: 73 MMHG | HEART RATE: 69 BPM | RESPIRATION RATE: 16 BRPM | SYSTOLIC BLOOD PRESSURE: 118 MMHG

## 2025-03-08 DIAGNOSIS — N89.8 VAGINAL DISCHARGE: ICD-10-CM

## 2025-03-08 DIAGNOSIS — N30.00 ACUTE CYSTITIS WITHOUT HEMATURIA: Primary | ICD-10-CM

## 2025-03-08 LAB
ALBUMIN SERPL BCP-MCNC: 4.4 G/DL (ref 3.4–5)
ALP SERPL-CCNC: 86 U/L (ref 33–136)
ALT SERPL W P-5'-P-CCNC: 16 U/L (ref 7–45)
ANION GAP SERPL CALC-SCNC: 12 MMOL/L (ref 10–20)
APPEARANCE UR: ABNORMAL
AST SERPL W P-5'-P-CCNC: 18 U/L (ref 9–39)
BASOPHILS # BLD AUTO: 0.2 X10*3/UL (ref 0–0.1)
BASOPHILS NFR BLD AUTO: 2.3 %
BILIRUB SERPL-MCNC: 0.2 MG/DL (ref 0–1.2)
BILIRUB UR STRIP.AUTO-MCNC: NEGATIVE MG/DL
BUN SERPL-MCNC: 22 MG/DL (ref 6–23)
C TRACH RRNA SPEC QL NAA+PROBE: NEGATIVE
CALCIUM SERPL-MCNC: 9.7 MG/DL (ref 8.6–10.3)
CHLORIDE SERPL-SCNC: 108 MMOL/L (ref 98–107)
CLUE CELLS SPEC QL WET PREP: NORMAL
CO2 SERPL-SCNC: 23 MMOL/L (ref 21–32)
COLOR UR: YELLOW
CREAT SERPL-MCNC: 0.63 MG/DL (ref 0.5–1.05)
EGFRCR SERPLBLD CKD-EPI 2021: >90 ML/MIN/1.73M*2
EOSINOPHIL # BLD AUTO: 0.87 X10*3/UL (ref 0–0.7)
EOSINOPHIL NFR BLD AUTO: 10.1 %
ERYTHROCYTE [DISTWIDTH] IN BLOOD BY AUTOMATED COUNT: 18.6 % (ref 11.5–14.5)
GLUCOSE SERPL-MCNC: 91 MG/DL (ref 74–99)
GLUCOSE UR STRIP.AUTO-MCNC: NORMAL MG/DL
HCT VFR BLD AUTO: 35.3 % (ref 36–46)
HGB BLD-MCNC: 10.9 G/DL (ref 12–16)
HOLD SPECIMEN: NORMAL
HYALINE CASTS #/AREA URNS AUTO: ABNORMAL /LPF
IMM GRANULOCYTES # BLD AUTO: 0.03 X10*3/UL (ref 0–0.7)
IMM GRANULOCYTES NFR BLD AUTO: 0.3 % (ref 0–0.9)
KETONES UR STRIP.AUTO-MCNC: NEGATIVE MG/DL
LEUKOCYTE ESTERASE UR QL STRIP.AUTO: ABNORMAL
LYMPHOCYTES # BLD AUTO: 2.64 X10*3/UL (ref 1.2–4.8)
LYMPHOCYTES NFR BLD AUTO: 30.6 %
MAGNESIUM SERPL-MCNC: 2.1 MG/DL (ref 1.6–2.4)
MCH RBC QN AUTO: 25.8 PG (ref 26–34)
MCHC RBC AUTO-ENTMCNC: 30.9 G/DL (ref 32–36)
MCV RBC AUTO: 84 FL (ref 80–100)
MONOCYTES # BLD AUTO: 0.53 X10*3/UL (ref 0.1–1)
MONOCYTES NFR BLD AUTO: 6.1 %
MUCOUS THREADS #/AREA URNS AUTO: ABNORMAL /LPF
N GONORRHOEA DNA SPEC QL PROBE+SIG AMP: NEGATIVE
NEUTROPHILS # BLD AUTO: 4.36 X10*3/UL (ref 1.2–7.7)
NEUTROPHILS NFR BLD AUTO: 50.6 %
NITRITE UR QL STRIP.AUTO: NEGATIVE
NRBC BLD-RTO: 0 /100 WBCS (ref 0–0)
PH UR STRIP.AUTO: 6 [PH]
PLATELET # BLD AUTO: 418 X10*3/UL (ref 150–450)
POTASSIUM SERPL-SCNC: 3.5 MMOL/L (ref 3.5–5.3)
PROT SERPL-MCNC: 7.5 G/DL (ref 6.4–8.2)
PROT UR STRIP.AUTO-MCNC: ABNORMAL MG/DL
RBC # BLD AUTO: 4.22 X10*6/UL (ref 4–5.2)
RBC # UR STRIP.AUTO: NEGATIVE MG/DL
RBC #/AREA URNS AUTO: >20 /HPF
SODIUM SERPL-SCNC: 139 MMOL/L (ref 136–145)
SP GR UR STRIP.AUTO: 1.03
SQUAMOUS #/AREA URNS AUTO: ABNORMAL /HPF
T VAGINALIS RRNA SPEC QL NAA+PROBE: NEGATIVE
T VAGINALIS SPEC QL WET PREP: NORMAL
UROBILINOGEN UR STRIP.AUTO-MCNC: NORMAL MG/DL
WBC # BLD AUTO: 8.6 X10*3/UL (ref 4.4–11.3)
WBC #/AREA URNS AUTO: >50 /HPF
WBC CLUMPS #/AREA URNS AUTO: ABNORMAL /HPF
WBC VAG QL WET PREP: >50
YEAST VAG QL WET PREP: NORMAL

## 2025-03-08 PROCEDURE — 85025 COMPLETE CBC W/AUTO DIFF WBC: CPT | Performed by: EMERGENCY MEDICINE

## 2025-03-08 PROCEDURE — 2500000002 HC RX 250 W HCPCS SELF ADMINISTERED DRUGS (ALT 637 FOR MEDICARE OP, ALT 636 FOR OP/ED): Performed by: EMERGENCY MEDICINE

## 2025-03-08 PROCEDURE — 87661 TRICHOMONAS VAGINALIS AMPLIF: CPT | Mod: PORLAB | Performed by: EMERGENCY MEDICINE

## 2025-03-08 PROCEDURE — 87086 URINE CULTURE/COLONY COUNT: CPT | Mod: PORLAB | Performed by: EMERGENCY MEDICINE

## 2025-03-08 PROCEDURE — 36415 COLL VENOUS BLD VENIPUNCTURE: CPT | Performed by: EMERGENCY MEDICINE

## 2025-03-08 PROCEDURE — 87491 CHLMYD TRACH DNA AMP PROBE: CPT | Mod: PORLAB | Performed by: EMERGENCY MEDICINE

## 2025-03-08 PROCEDURE — 83735 ASSAY OF MAGNESIUM: CPT | Performed by: EMERGENCY MEDICINE

## 2025-03-08 PROCEDURE — 87210 SMEAR WET MOUNT SALINE/INK: CPT | Performed by: EMERGENCY MEDICINE

## 2025-03-08 PROCEDURE — 80053 COMPREHEN METABOLIC PANEL: CPT | Performed by: EMERGENCY MEDICINE

## 2025-03-08 PROCEDURE — 81001 URINALYSIS AUTO W/SCOPE: CPT | Performed by: EMERGENCY MEDICINE

## 2025-03-08 RX ORDER — SULFAMETHOXAZOLE AND TRIMETHOPRIM 800; 160 MG/1; MG/1
1 TABLET ORAL EVERY 12 HOURS
Qty: 10 TABLET | Refills: 0 | Status: SHIPPED | OUTPATIENT
Start: 2025-03-08 | End: 2025-03-13

## 2025-03-08 RX ORDER — SULFAMETHOXAZOLE AND TRIMETHOPRIM 800; 160 MG/1; MG/1
1 TABLET ORAL ONCE
Status: COMPLETED | OUTPATIENT
Start: 2025-03-08 | End: 2025-03-08

## 2025-03-08 RX ADMIN — SULFAMETHOXAZOLE AND TRIMETHOPRIM 1 TABLET: 800; 160 TABLET ORAL at 04:05

## 2025-03-08 NOTE — ED PROVIDER NOTES
Barbara Sow is a 65 y.o. patient presenting to the ED for     Additional History Obtained from:   Limitations to History:  ------------------------------------------------------------------------------------------------------------------------------------------  Physical Exam:  Appearance: Alert, cooperative,   Skin: Warm, dry, appropriate color for ethnicity.  Eyes: Cornea clear. No scleral icterus or injection.   ENT: Mucous membranes moist.  Pulmonary: No accessory muscle use or stridor. Clear lung sounds bilaterally without rhonchi or wheezing.   Cardiac: Heart sounds regular without murmur. B/L radial pulses full and symmetric.   Abdomen: Soft, not tender.  No rebound or guarding.   Pelvic:  Musculoskeletal: No gross deformities.   Neurological: Face symmetrical. Voice clear. Appropriately conversant.   Psychiatric: Appropriate mood and affect.    Medical Decision Making:  Chronic Medical Conditions Significantly Affecting Care:  has a past medical history of Anxiety and depression, Colon stricture (Multi), Hypothyroidism, Ileus (Multi), Large bowel obstruction (Multi), and OA (osteoarthritis).    Social Determinants of Health Significantly Affecting Care:    Differential Diagnosis Considered but not limited to:       External Records Reviewed:   I reviewed recent and relevant outside records including:     Independent Interpretation of Studies: The following studies were ordered as part of the emergency department work up and independently interpreted by me. See ED Course for details.    Diagnoses as of 03/08/25 0400   Acute cystitis without hematuria   Vaginal discharge         Escalation of Care:        Discussion of Management with Other Providers:   I discussed the patient/results with:     Anxiety and depression, Colon stricture (Multi), Hypothyroidism, Ileus (Multi), Large bowel obstruction (Multi), and OA (osteoarthritis).    Social Determinants of Health Significantly Affecting Care: None identified    Differential Diagnosis Considered but not limited to: Patient with concerns for infection.  Her ostomy site looks good without any erythema or discharge.  Stool is appropriate color.  She does have some abdominal discomfort and vaginal symptoms therefore vaginal infection is high on the differential.  Urinary tract infection is also consideration.  Less likely other intra-abdominal infection.      External Records Reviewed:   I reviewed recent and relevant outside records including:     Independent Interpretation of Studies: The following studies were ordered as part of the emergency department work up and independently interpreted by me. See ED Course for details.    CBC, CMP are generally unremarkable.  Urinalysis does show leukocyte esterase with greater than 50 WBCs.  This could be contamination however given symptoms we will treat as urinary tract infection.    Swabs were sent for pelvic infection.  Wet prep is negative for trichomonas, clue cells, yeast.    The patient was started on Bactrim for urinary tract infection.  She is overall well-appearing and not septic.  I do believe she is stable for outpatient management.  She was discharged in stable condition with instructions for follow-up and return precautions.    Diagnoses as of 03/08/25 0400   Acute cystitis without hematuria   Vaginal discharge              Katie Amezquita, DO  03/21/25 0117

## 2025-03-09 LAB — BACTERIA UR CULT: NO GROWTH

## 2025-03-18 ENCOUNTER — APPOINTMENT (OUTPATIENT)
Dept: PRIMARY CARE | Facility: CLINIC | Age: 65
End: 2025-03-18
Payer: MEDICARE

## 2025-04-09 DIAGNOSIS — E03.9 ACQUIRED HYPOTHYROIDISM: ICD-10-CM

## 2025-04-10 RX ORDER — LEVOTHYROXINE SODIUM 100 UG/1
100 TABLET ORAL DAILY
Qty: 30 TABLET | Refills: 0 | Status: SHIPPED | OUTPATIENT
Start: 2025-04-10 | End: 2025-10-07

## 2025-04-15 ENCOUNTER — APPOINTMENT (OUTPATIENT)
Dept: SURGERY | Facility: CLINIC | Age: 65
End: 2025-04-15
Payer: MEDICARE

## 2025-04-16 ENCOUNTER — OFFICE VISIT (OUTPATIENT)
Dept: SURGERY | Facility: CLINIC | Age: 65
End: 2025-04-16
Payer: MEDICARE

## 2025-04-16 VITALS — WEIGHT: 127.2 LBS | TEMPERATURE: 97.3 F | OXYGEN SATURATION: 94 % | BODY MASS INDEX: 20.53 KG/M2

## 2025-04-16 DIAGNOSIS — K56.609 LARGE BOWEL OBSTRUCTION (MULTI): ICD-10-CM

## 2025-04-16 DIAGNOSIS — Z93.3 COLOSTOMY IN PLACE (MULTI): ICD-10-CM

## 2025-04-16 PROCEDURE — 99213 OFFICE O/P EST LOW 20 MIN: CPT | Performed by: COLON & RECTAL SURGERY

## 2025-04-16 PROCEDURE — 1036F TOBACCO NON-USER: CPT | Performed by: COLON & RECTAL SURGERY

## 2025-04-16 PROCEDURE — 1123F ACP DISCUSS/DSCN MKR DOCD: CPT | Performed by: COLON & RECTAL SURGERY

## 2025-04-16 PROCEDURE — 1157F ADVNC CARE PLAN IN RCRD: CPT | Performed by: COLON & RECTAL SURGERY

## 2025-04-16 PROCEDURE — 1126F AMNT PAIN NOTED NONE PRSNT: CPT | Performed by: COLON & RECTAL SURGERY

## 2025-04-16 ASSESSMENT — PAIN SCALES - GENERAL: PAINLEVEL_OUTOF10: 0-NO PAIN

## 2025-04-16 NOTE — NURSING NOTE
"Ostomy/Wound Care Consult     Visit Date: 4/16/2025      Patient Name: Barbara Sow         MRN: [id number]           YOB: 1960     WO nursing visit outcome: Pt here to discuss stoma closure with Dr. Valencia; has hernia and prolapse.  Has been managing well but would like it to be reversed.     WO next scheduled visit/plan: TBD    Stoma Type: Transverse Colostomy-loop  Diameter: 2\" when prolapse reduced, diameter larger when sitting/standing  Location: Upper Midline  Protrusion: Prolapsed  Mucosal Condition and Color: Moist, Red, Edematous  Mucocutaneous Junction: Intact  Peristomal Skin: Irritant Dermatitis/Denuded and Erythema  Location of Skin Impairment: circumferentially, worst distal to stoma  Peristomal Contour:  flat when lying flat, bulged when sitting/standing and hernia is pronounced  Supportive Tissue: Combination softer when hernia reduced and firmer when hernia protruding  Character of Output: Brown and Watery  Emptying Frequency: 4-5 times daily  Removed/Current Pouching System: Erick Premier [id number], Adapt ring, paste in umbilicus and Brava XL elastic barrier strips.  She wears tighter fitting camisole tank and an uncut Coloplast Brava ostomy support belt (she does apply when laying down and hernia/prolapse are reduced).   Current Wearing Time: 4-5 Days    Recommendations:   Skin Care: dust with stoma powder  Pouching System: same as above, has a lot of supplies at home and likely will have stoma reversed soon  Wear Time: same Days  Other: New Coloplast support belt provided today    Photo: 4/16/2025       Incision: healed    Supplier: [employer]    Comment:  daughter assists with pouching, pt calls her stoma \"Nahum\" for Nahum [place]    Time Increment: 45 minutes    Taina KRUEGERN, RN, CWOCN  4/16/2025  11:01 AM        "

## 2025-04-16 NOTE — PROGRESS NOTES
Barbara Souza is a 65 year old female with history of large bowel obstruction from splenic flexure stricture. S/P 7/2024 Transverse colostomy. Met with Dr. Valencia October 2024 and was advised to stop smoking. Surgery was scheduled for December 9, 2024 but surgery was cancelled after patient did not attend pre-admission testing.      History:  Presented to ER on July 18, 2024 with abdominal pain and not passing gas or stools.  A CT Abd/Pelvis with contrast revealed dilated stool filled colon with transition point in the region of the splenic flexure, suggesting at least partial obstruction.       Pt had been having cramping abdominal pains.  Normal bowel habit every other day.    Pt had weighed 154 lbs and started noticing weight loss and got down to 130's.  She did develop some constipation.   Weight currently is the same she has, eating well, cramping is gone.  No leaks from the stoma.  She is emptying every 4-5 bowel movements/day.        7/18/2024 CT Abd/Pelvis with contrast  -Dilated stool-filled colon with transition point in the region of the splenic flexure, suggesting at least partial obstruction. The colon is decompressed distal to this region. There is circumferential bowel wall thickening involving the mid and distal transverse colon, proximally 25 cm in length. Although the appearance favors colitis, colonoscopy is recommended to exclude an underlying mass given the presence of a transition point.  -Nonobstructive left renal calculi.     7/19/2024 Operation:  Exploration Laparotomy, transverse colostomy ([facility])      8/15/2024 Colonoscopy to TI  Findings  The ascending colon appeared normal.  Stricture (traversable after dilation) with length of 1.5 cm and diameter of 3 mm; performed 6 cold forceps biopsies; dilated with 50 mm long balloon dilator from 10 mm starting size to 12 mm end size with step size of 11 mm. Dilation caused improved passage of the scope and improved lumen appearance;  tattooed distal to the finding with laila ink  I was able to scope the entire colon distal to the stricture - the prep was excellent in the descending and sigmoid colon.  There were formed stool balls in the transverse colon between stoma and the stricture and in the rectum and distal sigmoid precluding adequate examination.  We scoped proximal to the stoma, distal to the stoma encountered the stricture, dilated and were able to pass through that to the sigmoid, and then scoped from the anus to the stricture.  A. COLON, TRANSVERSE, BIOPSY:     Colonic mucosa with fibrosis of the lamina propria.  Note: No neoplasia is identified.  No active inflammation is identified on initial and recut levels.      8/22/2024 Presented to ER with abdominal pain.  A CT revealed under distention versus persistent wall thickening of the transverse colon.     10/10/2024 Presented to the ER with abdominal pain, nausea  and stoma prolapse.  Prolapse was able to be reduced.     12/29/2024 CT Abd/Pelvis with contrast  1. Interval development of a large parastomal herniation with wide neck along the midline of the lower abdomen and colostomy site.  Parastomal hernia sac contains predominantly mesentery although there is a loop of small bowel within the hernia sac as well. The contain loop of small bowel shows no evidence of abnormality or abnormal dilatation.  2. Large amount of stool within the cecum and ascending colon.     Visit Vitals  Temp 36.3 °C (97.3 °F)   Wt 57.7 kg (127 lb 3.2 oz)   SpO2 94%   BMI 20.53 kg/m²   OB Status Hysterectomy   Smoking Status Every Day   BSA 1.64 m²     Review of Systems  Constitutional: Negative for fever, chills, anorexia, weight loss, malaise             ENMT: Negative for nasal discharge, congestion, ear pain, mouth pain, throat pain                 Respiratory: Negative for cough, hemoptysis, wheezing, shortness of breath                Cardiac: Negative for chest pain, dyspnea on exertion, orthopnea,  palpitations, syncope         Gastrointestinal: Negative for nausea, vomiting, diarrhea, constipation, abdominal pain, (+)s/p COLOSTOMY FOR LBO     Genitourinary: Negative for discharge, dysuria, flank pain, frequency, hematuria          Musculoskeletal: Negative for decreased ROM, pain, swelling, weakness          Neurological: Negative for dizziness, confusion, headache, seizures, syncope               Psychiatric: Negative for mood changes, anxiety, hallucinations, sleep changes, suicidal ideas, (+)ANXIETY/DEPRESSION             Skin: Negative for mass, pain, itching, rash, ulcer            Endocrine: Negative for heat intolerance, cold intolerance, excessive sweating, polyuria, excess thirst, (+)HYPOTHRYOIDISM        Hematologic/Lymph: Negative for anemia, bruising, easy bleeding, night sweats, petechiae, history of DVT/PE or cancer               Allergic/Immunologic: Negative for anaphylaxis, itchy/ teary eyes, itching, sneezing, swelling     Physical Exam  Constitutional: Well developed, awake/alert/oriented x3, no distress, alert and cooperative             Eyes: Sclera anicteric, no conjunctival inflammation, conjugate gaze    ENMT: mucous membranes moist, no apparent injury,            Head/Neck: Neck supple, no apparent injury, No JVD, trachea midline, no bruits              Respiratory/Thorax: Patent airways, CTAB, normal breath sounds with good chest expansion, thorax symmetric         Cardiovascular: Regular, rate and rhythm, no murmurs, normal S1 and S2         Gastrointestinal: Nondistended, soft, non-tender, no rebound tenderness or guarding, no masses palpable, no organomegaly, +BS, no bruits, Prolapsing stoma (both limbs) in the midline with large hernia.  Soft and reducible.               Extremities: normal extremities, no cyanosis edema, contusions or wounds, 2+ femoral pulses B/L              Neurological: alert and oriented x3, normal strength, Normal gait          Lymphatic: No palpable  inguinal lymphadenopathy   Psychological: Appropriate mood and behavior         Skin: Warm and dry, no lesions, no rashes                   Impression:    Pt s/p LBO from splenic flexure stricture prolapse and hernia, and has started smoking again.      Plan:    Splenic flexure resection and colostomy takedown   Smoking cessation discussed.

## 2025-04-17 DIAGNOSIS — K56.699 COLON STRICTURE (MULTI): ICD-10-CM

## 2025-04-17 DIAGNOSIS — K56.609 LARGE BOWEL OBSTRUCTION (MULTI): Primary | ICD-10-CM

## 2025-04-17 RX ORDER — CHLORHEXIDINE GLUCONATE ORAL RINSE 1.2 MG/ML
SOLUTION DENTAL
Qty: 120 ML | Refills: 0 | Status: SHIPPED | OUTPATIENT
Start: 2025-04-17

## 2025-04-17 RX ORDER — NEOMYCIN SULFATE 500 MG/1
TABLET ORAL
Qty: 6 TABLET | Refills: 0 | Status: SHIPPED | OUTPATIENT
Start: 2025-04-17

## 2025-04-17 RX ORDER — HEPARIN SODIUM 5000 [USP'U]/ML
5000 INJECTION, SOLUTION INTRAVENOUS; SUBCUTANEOUS ONCE
OUTPATIENT
Start: 2025-04-17 | End: 2025-04-17

## 2025-04-17 RX ORDER — METRONIDAZOLE 250 MG/1
TABLET ORAL
Qty: 3 TABLET | Refills: 0 | Status: SHIPPED | OUTPATIENT
Start: 2025-04-17

## 2025-04-17 RX ORDER — METRONIDAZOLE 500 MG/100ML
500 INJECTION, SOLUTION INTRAVENOUS ONCE
OUTPATIENT
Start: 2025-04-17 | End: 2025-04-17

## 2025-04-17 RX ORDER — LEVOFLOXACIN 5 MG/ML
500 INJECTION, SOLUTION INTRAVENOUS ONCE
OUTPATIENT
Start: 2025-04-17 | End: 2025-04-17

## 2025-04-29 ENCOUNTER — APPOINTMENT (OUTPATIENT)
Dept: PRIMARY CARE | Facility: CLINIC | Age: 65
End: 2025-04-29
Payer: MEDICAID

## 2025-05-20 NOTE — PROGRESS NOTES
Call regarding appt. with PCP on 08/01/24 after hospitalization.  At time of outreach call the patient feels as if their condition has (improved since last visit.  Reviewed the PCP appointment with the pt and addressed any questions or concerns.     Consultation - Cardiology   Alex Baptiste 74 y.o. male MRN: [id number]  Unit/Bed#: 2 E 271-01 Encounter: [id number]  05/20/25  10:33 AM    Assessment & Plan  Acute on chronic HFpEF  Mildly hypervolemic on exam.  , CXR shows cardiomegaly with mild pulmonary vascular congestion.  Recent TTE reviewed with EF 60%.  Continue IV Lasix 40 mg bid; consider transition to PO tomorrow.  Start Jardiance 10 mg daily (no out-of-pocket cost per case management).  Start Aldactone for optimization of BP as per below.  Chronic lymphedema reportedly at baseline.  Replete electrolytes as needed.  Recommend strict I/O's, daily weights, and sodium restriction.  CAD s/p PCI and CABG x3 (2017)  Continue ASA, pravastatin, Coreg, amlodipine, and Imdur.  Primary hypertension  BP has been elevated, continue to monitor.  Start aldactone as per above.  Continue amlodipine, Coreg, losartan, hydralazine, Imdur, and Lasix.  Dyslipidemia  Continue pravastatin.  T2DM  Management per primary service.        History of Present Illness   Physician Requesting Consult: Mitchell Owen MD    Reason for Consult / Principal Problem: Acute on chronic HFpEF    HPI: Alex Baptiste is a 74 y.o. year old male with history of chronic HFpEF, CABG s/p PCI and CABG x3, hypertension, dyslipidemia, T2DM, MONICA, and Parkinson's who presents with worsening shortness of breath over the past several months.  Patient found to be mildly volume overloaded with elevated BNP and imaging suspicious for CHF.  Cardiology consulted for further evaluation and management.  Patient notes noncompliance with sodium restriction.  Endorses strict compliance to all medications.  Reports chronic lymphedema which is reportedly at baseline.  Denies any additional complaints at this time.  Follows with Dr. Cedillo as primary cardiologist.      Inpatient consult to Cardiology  Consult performed by: Alok Knight PA-C  Consult ordered by: TG Loredo        Review  "of Systems   Constitutional:  Negative for chills and fever.   HENT:  Negative for ear pain and sore throat.    Eyes:  Negative for pain and visual disturbance.   Respiratory:  Positive for shortness of breath. Negative for cough.    Cardiovascular:  Positive for leg swelling (chronic). Negative for chest pain and palpitations.   Gastrointestinal:  Negative for abdominal pain and vomiting.   Genitourinary:  Negative for dysuria and hematuria.   Musculoskeletal:  Negative for arthralgias and back pain.   Skin:  Negative for color change and rash.   Neurological:  Negative for seizures and syncope.   All other systems reviewed and are negative.      Historical Information   Past Medical History:   Diagnosis Date    Arthritis     Hyperlipidemia     Hypertension     Parkinson disease (HCC) 01/01/2025    Poor circulation     Sleep apnea     Type 2 diabetes mellitus (HCC)      Past Surgical History:   Procedure Laterality Date    BACK SURGERY      CORONARY ARTERY BYPASS GRAFT  07/22/2017    HEMORROIDECTOMY      TONSILLECTOMY      WRIST SURGERY       Social History     Substance and Sexual Activity   Alcohol Use Yes    Comment: 5 drinks daily     Social History     Substance and Sexual Activity   Drug Use No     Tobacco Use History[1]    Family History:   Family History   Problem Relation Age of Onset    Diabetes type II Mother     Hypertension Mother     Breast cancer Sister     Lung cancer Sister     Multiple endocrine neoplasia Brother     Breast cancer Sister        Meds/Allergies   all current active meds have been reviewed  Allergies[2]    Objective   Vitals: Blood pressure 169/79, pulse 66, temperature 98.7 °F (37.1 °C), resp. rate 18, height 5' 10\" (1.778 m), weight 123 kg (270 lb 3.2 oz), SpO2 95%., Body mass index is 38.77 kg/m².,   Orthostatic Blood Pressures      Flowsheet Row Most Recent Value   Blood Pressure 169/79 filed at 05/20/2025 0658   Patient Position - Orthostatic VS Sitting filed at 05/20/2025 0300 "            Systolic (24hrs), Av , Min:134 , Max:180     Diastolic (24hrs), Av, Min:60, Max:99        Intake/Output Summary (Last 24 hours) at 2025 1033  Last data filed at 2025 1001  Gross per 24 hour   Intake 180 ml   Output 1975 ml   Net -1795 ml       Invasive Devices       Peripheral Intravenous Line  Duration             Peripheral IV 25 Left Antecubital <1 day                        Physical Exam:  GEN: Alert and oriented x3, in no acute distress.  Well appearing, obese, and well nourished.   HEENT: Sclera anicteric, conjunctivae pink, mucous membranes moist. Oropharynx clear.   NECK: Supple, no carotid bruits, no significant JVD. Trachea midline, no thyromegaly.   HEART: Regular rhythm, normal S1 and S2, no murmurs, clicks, gallops or rubs. PMI nondisplaced, no thrills.   LUNGS: Trace crackles bilaterally; no wheezes or rhonchi. No increased work of breathing or signs of respiratory distress.   ABDOMEN: Soft, nontender, nondistended, normoactive bowel sounds.   EXTREMITIES: Skin warm and well perfused, no clubbing or cyanosis, + lymphedema.  NEURO: No focal findings. Normal speech. Mood and affect normal.   SKIN: Normal without suspicious lesions on exposed skin.    Lab Results:     Cardiac Profile:   Results from last 7 days   Lab Units 25  1808 25  1609 25  1302   HS TNI 0HR ng/L  --   --  24   HS TNI 2HR ng/L  --  26  --    HSTNI D2 ng/L  --  2  --    HS TNI 4HR ng/L 27  --   --    HSTNI D4 ng/L 3  --   --        CBC with diff:   Results from last 7 days   Lab Units 25  0505 25  1302   WBC Thousand/uL 7.96 8.55   HEMOGLOBIN g/dL 11.4* 10.6*   HEMATOCRIT % 33.5* 31.2*   MCV fL 98 99*   PLATELETS Thousands/uL 102* 105*   RBC Million/uL 3.43* 3.16*   MCH pg 33.2 33.5   MCHC g/dL 34.0 34.0   RDW % 14.9 14.9   MPV fL 10.8 9.9   NRBC AUTO /100 WBCs  --  0         CMP:   Results from last 7 days   Lab Units 25  0505 25  1302   POTASSIUM mmol/L 3.7  4.3   CHLORIDE mmol/L 103 104   CO2 mmol/L 28 27   BUN mg/dL 15 19   CREATININE mg/dL 0.76 0.82   CALCIUM mg/dL 9.2 9.1   AST U/L  --  25   ALT U/L  --  10   ALK PHOS U/L  --  85   EGFR ml/min/1.73sq m 89 86            [1]   Social History  Tobacco Use   Smoking Status Never    Passive exposure: Past   Smokeless Tobacco Never   [2]   Allergies  Allergen Reactions    Penicillins      Other reaction(s): Other, Unknown

## 2025-06-04 ENCOUNTER — TELEPHONE (OUTPATIENT)
Dept: PREADMISSION TESTING | Facility: HOSPITAL | Age: 65
End: 2025-06-04
Payer: MEDICARE

## 2025-06-06 ENCOUNTER — TELEPHONE (OUTPATIENT)
Dept: PRIMARY CARE | Facility: CLINIC | Age: 65
End: 2025-06-06
Payer: MEDICARE

## 2025-06-06 DIAGNOSIS — E03.9 ACQUIRED HYPOTHYROIDISM: ICD-10-CM

## 2025-06-06 DIAGNOSIS — M79.2 NEUROPATHIC PAIN: ICD-10-CM

## 2025-06-06 RX ORDER — LEVOTHYROXINE SODIUM 100 UG/1
100 TABLET ORAL DAILY
Qty: 3 TABLET | Refills: 0 | Status: SHIPPED | OUTPATIENT
Start: 2025-06-06 | End: 2025-12-03

## 2025-06-06 RX ORDER — GABAPENTIN 600 MG/1
TABLET ORAL
Qty: 15 TABLET | Refills: 0 | Status: SHIPPED | OUTPATIENT
Start: 2025-06-06

## 2025-06-09 ENCOUNTER — PRE-ADMISSION TESTING (OUTPATIENT)
Dept: PREADMISSION TESTING | Facility: HOSPITAL | Age: 65
End: 2025-06-09
Payer: MEDICARE

## 2025-06-09 NOTE — TELEPHONE ENCOUNTER
LVM letting the patient know the request was approved   
Medication Name:levothyroxine (Synthroid, Levoxyl)  Dose: 100 mcg   Frequency:Daily   Quantity left:0    The medication below its originally prescribe for 600 mg but the surgeon will like her to take a 100 mcg instead for surgery purposes.     Medication Name:gabapentin (Neurontin)   Dose: 100 mcg  Frequency: 2 tbs in the morning 3 tabs at night   Quantity left:0    Pharmacy:[facility] [id number] [facility] [address], [facility] [address]        Last appointment:8/1/2024  Last CPE:4/25/2024  Last MCW:N/A  Next appointment:6/25/2025  Next CPE:N/A  Next MCW: 6/25/2025  
no

## 2025-06-10 ENCOUNTER — LAB (OUTPATIENT)
Dept: LAB | Facility: HOSPITAL | Age: 65
End: 2025-06-10
Payer: MEDICARE

## 2025-06-10 ENCOUNTER — PRE-ADMISSION TESTING (OUTPATIENT)
Dept: PREADMISSION TESTING | Facility: HOSPITAL | Age: 65
End: 2025-06-10
Payer: MEDICARE

## 2025-06-10 VITALS
RESPIRATION RATE: 16 BRPM | HEIGHT: 67 IN | HEART RATE: 77 BPM | WEIGHT: 132.28 LBS | OXYGEN SATURATION: 95 % | BODY MASS INDEX: 20.76 KG/M2 | DIASTOLIC BLOOD PRESSURE: 72 MMHG | SYSTOLIC BLOOD PRESSURE: 105 MMHG | TEMPERATURE: 97.1 F

## 2025-06-10 DIAGNOSIS — E03.9 HYPOTHYROIDISM, UNSPECIFIED TYPE: Primary | ICD-10-CM

## 2025-06-10 LAB
ABO GROUP (TYPE) IN BLOOD: NORMAL
ANION GAP SERPL CALC-SCNC: 15 MMOL/L (ref 10–20)
ANTIBODY SCREEN: NORMAL
BASOPHILS # BLD AUTO: 0.11 X10*3/UL (ref 0–0.1)
BASOPHILS NFR BLD AUTO: 1.7 %
BUN SERPL-MCNC: 28 MG/DL (ref 6–23)
CALCIUM SERPL-MCNC: 9.4 MG/DL (ref 8.6–10.3)
CHLORIDE SERPL-SCNC: 101 MMOL/L (ref 98–107)
CO2 SERPL-SCNC: 22 MMOL/L (ref 21–32)
CREAT SERPL-MCNC: 0.72 MG/DL (ref 0.5–1.05)
EGFRCR SERPLBLD CKD-EPI 2021: >90 ML/MIN/1.73M*2
EOSINOPHIL # BLD AUTO: 0.39 X10*3/UL (ref 0–0.7)
EOSINOPHIL NFR BLD AUTO: 6.1 %
ERYTHROCYTE [DISTWIDTH] IN BLOOD BY AUTOMATED COUNT: 16.7 % (ref 11.5–14.5)
GLUCOSE SERPL-MCNC: 77 MG/DL (ref 74–99)
HCT VFR BLD AUTO: 34.5 % (ref 36–46)
HGB BLD-MCNC: 11.1 G/DL (ref 12–16)
IMM GRANULOCYTES # BLD AUTO: 0.02 X10*3/UL (ref 0–0.7)
IMM GRANULOCYTES NFR BLD AUTO: 0.3 % (ref 0–0.9)
LYMPHOCYTES # BLD AUTO: 2.24 X10*3/UL (ref 1.2–4.8)
LYMPHOCYTES NFR BLD AUTO: 35.2 %
MCH RBC QN AUTO: 29.5 PG (ref 26–34)
MCHC RBC AUTO-ENTMCNC: 32.2 G/DL (ref 32–36)
MCV RBC AUTO: 92 FL (ref 80–100)
MONOCYTES # BLD AUTO: 0.43 X10*3/UL (ref 0.1–1)
MONOCYTES NFR BLD AUTO: 6.8 %
NEUTROPHILS # BLD AUTO: 3.18 X10*3/UL (ref 1.2–7.7)
NEUTROPHILS NFR BLD AUTO: 49.9 %
NRBC BLD-RTO: 0 /100 WBCS (ref 0–0)
PLATELET # BLD AUTO: 407 X10*3/UL (ref 150–450)
POTASSIUM SERPL-SCNC: 4.1 MMOL/L (ref 3.5–5.3)
RBC # BLD AUTO: 3.76 X10*6/UL (ref 4–5.2)
RH FACTOR (ANTIGEN D): NORMAL
SODIUM SERPL-SCNC: 134 MMOL/L (ref 136–145)
WBC # BLD AUTO: 6.4 X10*3/UL (ref 4.4–11.3)

## 2025-06-10 PROCEDURE — 87081 CULTURE SCREEN ONLY: CPT | Mod: AHULAB | Performed by: NURSE PRACTITIONER

## 2025-06-10 PROCEDURE — 93005 ELECTROCARDIOGRAM TRACING: CPT | Performed by: NURSE PRACTITIONER

## 2025-06-10 PROCEDURE — 80048 BASIC METABOLIC PNL TOTAL CA: CPT

## 2025-06-10 PROCEDURE — 86850 RBC ANTIBODY SCREEN: CPT

## 2025-06-10 PROCEDURE — 86900 BLOOD TYPING SEROLOGIC ABO: CPT

## 2025-06-10 PROCEDURE — 86901 BLOOD TYPING SEROLOGIC RH(D): CPT

## 2025-06-10 PROCEDURE — 85025 COMPLETE CBC W/AUTO DIFF WBC: CPT

## 2025-06-10 ASSESSMENT — ENCOUNTER SYMPTOMS
NEUROLOGICAL NEGATIVE: 1
NECK NEGATIVE: 1
ENDOCRINE NEGATIVE: 1
ARTHRALGIAS: 1
CARDIOVASCULAR NEGATIVE: 1
ABDOMINAL PAIN: 1
CONSTITUTIONAL NEGATIVE: 1
RESPIRATORY NEGATIVE: 1

## 2025-06-10 NOTE — CPM/PAT NURSE NOTE
CPM/PAT Nurse Note      Name: Barbara SHARMA Magi (Barbara Sow)  /Age: 1960/65 y.o.       Medical History[1]    Surgical History[2]    Patient Sexual activity questions deferred to the physician.    Family History[3]    Allergies[4]    Prior to Admission medications    Medication Sig Start Date End Date Taking? Authorizing Provider   DULoxetine (Cymbalta) 60 mg DR capsule Take 1 capsule (60 mg) by mouth once daily. Do not crush or chew. 10/23/24 10/23/25 Yes Maninder Christensen MD   gabapentin (Neurontin) 600 mg tablet Take 2 tabs in the morning and 3 tabs at night. 25  Yes Giorgi Ledbetter MD   levothyroxine (Synthroid, Levoxyl) 100 mcg tablet Take 1 tablet (100 mcg) by mouth early in the morning.. Except on Sundays take 2 tabs 6/6/25 12/3/25 Yes Giorgi Ledbetter MD   ondansetron (Zofran) 8 mg tablet Take 1 tablet (8 mg) by mouth every 8 hours if needed for nausea or vomiting for up to 15 doses. 24  Yes Richie Rivas MD   chlorhexidine (Peridex) 0.12 % solution Rinse mouth with 15 ml after toothbrushing the night before surgery and on the morning of surgery.  Expectorate after rinsing.  Do not swallow. 25   Ingrid Valencia MD   metroNIDAZOLE (Flagyl) 250 mg tablet Take one tablet at 6p, 7p, and 11p the night before surgery. 25   Ingrid Valencia MD   neomycin (Mycifradin) 500 mg tablet Take two tabs by mouth at 6p, 7pm, and 11p the night before surgery. 25   Ingrid Valencia MD   nicotine (Nicoderm CQ) 7 mg/24 hr patch Place 1 patch over 24 hours on the skin once every 24 hours.  Patient not taking: Reported on 6/10/2025 10/23/24 12/22/24  Ingrid Valencia MD   polyethylene glycol (Glycolax, Miralax) 17 gram/dose powder Dissolve 17 grams in liquid as directed and take by mouth once daily.  Patient not taking: Mix of powder and drink. Reported on 6/10/2025 7/30/24   Sharee Freeman MD   gabapentin (Neurontin) 600 mg tablet Take 2 tabs in the morning and 3  tabs at night. 1/13/25 6/6/25  Maninder Christensen MD   levothyroxine (Synthroid, Levoxyl) 100 mcg tablet Take 1 tablet (100 mcg) by mouth early in the morning.. Except on Sundays take 2 tabs 4/10/25 6/6/25  Maninder Christensen MD        PAT ROS     DASI Risk Score    No data to display       Caprini DVT Assessment      Flowsheet Row ED to Hosp-Admission (Discharged) from 7/27/2024 in [address] with Sharee Freeman MD and Topher Malcolm DO ED to Hosp-Admission (Discharged) from 7/18/2024 in [address] with Sharee Freeman MD and Karly Villa MD   DVT Score (IF A SCORE IS NOT CALCULATING, MUST SELECT A BMI TO COMPLETE) 4 filed at 07/27/2024 0541 7 filed at 07/18/2024 1511   BMI (BMI MUST BE CHOSEN) 30 or less filed at 07/27/2024 0541 30 or less filed at 07/18/2024 1511   RETIRED: Current Status -- Major surgery planned, lasting 2-3 hours filed at 07/18/2024 1511   RETIRED: History Prior major surgery filed at 07/27/2024 0541 Prior major surgery filed at 07/18/2024 1511   RETIRED: Age 60-75 years filed at 07/27/2024 0541 60-75 years filed at 07/18/2024 1511          Modified Frailty Index    No data to display       [id number] Stroke Risk Points  Current as of just now        N/A 0 to 9 Points:      Last Change: N/A          The [id number] risk score (Lip MARAL, et al. 2009. © 2010 [employer]) quantifies the risk of stroke for a patient with atrial fibrillation. For patients without atrial fibrillation or under the age of 18 this score appears as N/A. Higher score values generally indicate higher risk of stroke.        This score is not applicable to this patient. Components are not calculated.          Revised Cardiac Risk Index    No data to display       Apfel Simplified Score    No data to display       Risk Analysis Index Results This Encounter    No data found in the last 10 encounters.       Prodigy: High Risk  Total Score: 8               Prodigy Age Score           ARISCAT Score for Postoperative Pulmonary Complications    No data to display       Alves Perioperative Risk for Myocardial Infarction or Cardiac Arrest (RICKY)    No data to display         Nurse Plan of Action:       After Visit Summary (AVS) reviewed and patient verbalized good understanding of medications and NPO instructions.  Pre-op infection prevention measures:  CHG showers and mouthwash reviewed, understanding voiced.  CHG soap given and patient verbalized need to pick CHG mouthwash at their preferred local pharmacy.            [1]   Past Medical History:  Diagnosis Date    Anemia     3.8.25: H/H 10.9/35.3    Anxiety and depression     Cervical radiculopathy     Colon stricture (Multi)     Plan: Laparoscopic Takedown of Colostomy; Splenic Flexure Resection with Anastomosis; Closure Colostomy 12/9/24    DDD (degenerative disc disease), lumbar     Hypothyroidism     Ileus (Multi)     Large bowel obstruction (Multi)     s/p Exploration Laparotomy, transverse colostomy 7/19/24    OA (osteoarthritis)    [2]   Past Surgical History:  Procedure Laterality Date    APPENDECTOMY      CHOLECYSTECTOMY      COLONOSCOPY  08/15/2024    COLOSTOMY  07/19/2024    Exploration Laparotomy, transverse colostomy    HYSTERECTOMY     [3]   Family History  Problem Relation Name Age of Onset    No Known Problems Mother      Heart attack Father      Aneurysm Sister     [4]   Allergies  Allergen Reactions    Cefaclor Hives    Cefuroxime Axetil Hives    Ketorolac Headache

## 2025-06-10 NOTE — PREPROCEDURE INSTRUCTIONS
Medication List            Accurate as of Carlee 10, 2025  1:21 PM. Always use your most recent med list.                chlorhexidine 0.12 % solution  Commonly known as: Peridex  Rinse mouth with 15 ml after toothbrushing the night before surgery and on the morning of surgery.  Expectorate after rinsing.  Do not swallow.  Medication Adjustments for Surgery: Take/Use as prescribed     DULoxetine 60 mg DR capsule  Commonly known as: Cymbalta  Take 1 capsule (60 mg) by mouth once daily. Do not crush or chew.  Medication Adjustments for Surgery: Take/Use as prescribed     gabapentin 600 mg tablet  Commonly known as: Neurontin  Take 2 tabs in the morning and 3 tabs at night.  Medication Adjustments for Surgery: Take/Use as prescribed     levothyroxine 100 mcg tablet  Commonly known as: Synthroid, Levoxyl  Take 1 tablet (100 mcg) by mouth early in the morning.. Except on Sundays take 2 tabs  Medication Adjustments for Surgery: Take/Use as prescribed     metroNIDAZOLE 250 mg tablet  Commonly known as: Flagyl  Take one tablet at 6p, 7p, and 11p the night before surgery.  Medication Adjustments for Surgery: Take/Use as prescribed     neomycin 500 mg tablet  Commonly known as: Mycifradin  Take two tabs by mouth at 6p, 7pm, and 11p the night before surgery.  Medication Adjustments for Surgery: Take/Use as prescribed     nicotine 7 mg/24 hr patch  Commonly known as: Nicoderm CQ  Place 1 patch over 24 hours on the skin once every 24 hours.  Medication Adjustments for Surgery: Do Not take on the morning of surgery     ondansetron 8 mg tablet  Commonly known as: Zofran  Take 1 tablet (8 mg) by mouth every 8 hours if needed for nausea or vomiting for up to 15 doses.  Medication Adjustments for Surgery: Take/Use as prescribed     polyethylene glycol 17 gram/dose powder  Commonly known as: Glycolax, Miralax  Dissolve 17 grams in liquid as directed and take by mouth once daily.  Medication Adjustments for Surgery: Do Not take on the  morning of surgery              Preoperative Deep Breathing Exercises  Why it is important to do deep breathing exercises before my surgery?  Deep breathing exercises strengthen your breathing muscles.  This helps you to recover after your surgery and decreases the chance of breathing complications.  How are the deep breathing exercises done?  Sit straight with your back supported.  Breathe in deeply and slowly through your nose. Your lower rib cage should expand and your abdomen may move forward.  Hold that breath for 3 to 5 seconds.  Breathe out through pursed lips, slowly and completely.  Rest and repeat 10 times every hour while awake.  Rest longer if you become dizzy or lightheaded.        CONTACT SURGEON'S OFFICE IF YOU DEVELOP:  * Fever = 100.4 F   * New respiratory symptoms (e.g. cough, shortness of breath, respiratory distress, sore throat)  * Recent loss of taste or smell  *Flu like symptoms such as headache, fatigue or gastrointestinal symptoms  * You develop any open sores, shingles, burning or painful urination   AND/OR:  * You no longer wish to have the surgery.  * Any other personal circumstances change that may lead to the need to cancel or defer this surgery.  *You were admitted to any hospital within one week of your planned procedure.    SMOKING:  *Quitting smoking can make a huge difference to your health and recovery from surgery.    *If you need help with quitting, call 3-458-QUIT-NOW.    THE DAY OF SURGERY:  *Do not eat any food after midnight the night before your surgery.   *YOU MUST drink 14 OUNCES of clear liquids TWO hours before your instructed ARRIVAL TIME to the hospital. This includes water, black tea/coffee (no milk or cream), apple juice, clear broth and electrolyte drinks (Gatorade).  Please avoid clear liquids that are red in color.   *You may chew gum/mints up to TWO hours before your surgery/procedure.    SURGICAL TIME:  *You will be contacted between 2 p.m. and 6 p.m. the  business day before your surgery with your arrival time.  *If you haven't received a call by 6pm, call [phone].  *Scheduled surgery times may change and you will be notified if this occurs-check your personal voicemail for any updates.    ON THE MORNING OF SURGERY:  *Wear comfortable, loose fitting clothing.   *Do not use moisturizers, creams, lotions or perfume.  *All jewelry and valuables should be left at home.  *Prosthetic devices such as contact lenses, hearing aids, dentures, eyelash extensions, hairpins and body piercing must be removed before surgery.    BRING WITH YOU:  *Photo ID and insurance card  *Current list of medications and allergies  *Pacemaker/Defibrillator/Heart stent cards  *CPAP machine and mask  *Slings/splints/crutches  *Copy of your complete Advanced Directive/DHPOA-if applicable  *Neurostimulator implant remote    PARKING AND ARRIVAL:  *Check in at the Main Entrance desk and let them know you are here for surgery.  *You will be directed to the 2nd floor surgical waiting area.    IF YOU ARE HAVING OUTPATIENT/SAME DAY SURGERY:  *A responsible adult MUST accompany you at the time of discharge and stay with you for 24 hours after your surgery.  *You may NOT drive yourself home after surgery.  *You may use a taxi or ride sharing service ([employer], Uber) to return home ONLY if you are accompanied by a friend or family member.  *Instructions for resuming your medications will be provided by your surgeon.    Home Preoperative Antibacterial Shower     What is a home preoperative antibacterial shower?  This shower is a way of cleaning the skin with a germ killing soap before surgery.  The soap contains chlorhexidine, commonly known as CHG.  CHG is a soap for your skin with germ killing ability.  Let your doctor know if you are allergic to chlorhexidine.    Why do I need to take a preoperative antibacterial shower?  Skin is not sterile.  It is best to try to make your skin as free of germs as  possible before surgery.  Proper cleansing with a germ killing soap before surgery can lower the number of germs on your skin.  This helps to reduce the risk of infection at the surgical site.  Following the instructions listed below will help you prepare your skin for surgery.      How do I use the CHG skin cleanser?  Steps:  Begin using your CHG soap five days before your scheduled surgery on ________________________.    Days 1-4 Shower before bed:  Wash your face and genitals with your normal soap and rinse.           2.    Apply the CHG soap to a clean wet washcloth.  Turn the water off or move away                From the water spray to avoid premature rinsing of the CHG soap as you are applying.     3.   Lather your entire body from the neck down.  Do not use on your face or genitals.  4. Pay special attention to the area(s) where your incision(s) will be located unless they are on your face.  Avoid scrubbing your skin too hard.  The important point is to have the CHG soap sit on your skin for 3 minutes.    When the 3 minutes are up, turn on the water and rinse the CHG soap off your body completely.   Pat yourself dry with a clean, freshly-laundered towel.  Dress in clean, freshly laundered night clothes.    Be sure to change bed sheets and blankets at least on the first night of CHG body wash use. May change linens every night of the above protocol for maximum benefit.   Day 5:  Last shower is the morning of surgery: Follow above Instructions.    NOTE:        *Keep CHG soap out of eyes and ear canals   *DO NOT wash with regular soap on your body after you have used the CHG soap solution  *DO NOT apply powders, lotions, or perfume.  *Deodorant may be used days 1-4, BUT NOT the day of surgery    Who should I contact if I have any questions regarding the use of CHG soap?  Call the [facility], Preadmission Testing at [phone] if you have any questions.              Patient  Information: Pre-Operative Infection Prevention Measures     Why did I have my nose, under my arms and groin swabbed?  The purpose of the swab is to identify Staphylococcus aureus inside your nose or on your skin.  The swab was sent to the laboratory for culture.  A positive swab/culture for Staphylococcus aureus is called colonization or carriage.      What is Staphylococcus aureus?  Staphylococcus aureus, also known as “staph”, is a germ found on the skin or in the nose of healthy people.  Sometimes Staphylococcus aureus can get into the body and cause an infection.  This can be minor (such as pimples, boils or other skin problems).  It might also be serious (such as blood infection, pneumonia or a surgical site infection).    What is Staphylococcus aureus colonization or carriage?  Colonization or carriage means that a person has the germ but is not sick from it.  These bacteria can be spread on the hands or when breathing or sneezing.    How is Staphylococcus aureus spread?  It is most often spread by close contact with a person or item that carries it.    What happens if my culture is positive for Staphylococcus aureus?  Your doctor/medical team will use this information to guide any antibiotic treatment which may be necessary.  Regardless of the culture results, we will clean the inside of your nose with a betadine swab just before you have your surgery.      Will I get an infection if I have Staphylococcus aureus in my nose or on my skin?  Anyone can get an infection with Staphylococcus aureus.  However, the best way to reduce your risk of infection is to follow the instructions provided to you for the use of your CHG soap and dental rinse.        Who should I contact if I have any questions?  Call the [facility], Preadmission Testing at [phone] if you have any questions.          Patient Information: Oral/Dental Rinse  **This is a prescription; pick it up at your preferred  local pharmacy **  What is oral/dental rinse?   It is a mouthwash. It is a way of cleaning the mouth with a germ killing solution before your surgery.  The solution contains chlorhexidine, commonly known as CHG.   It is used inside the mouth to kill a bacteria known as Staphylococcus aureus.  Let your doctor know if you are allergic to Chlorhexidine.    Why do I need to use CHG oral/dental rinse?  The CHG oral/dental rinse helps to kill a bacteria in your mouth known a Staphylococcus aureus.     This reduces the risk of infection at the surgical site.      Using your CHG oral/dental rinse  STEPS:  Use your CHG oral/dental rinse after you brush your teeth the night before (at bedtime) and the morning of your surgery.  Follow all directions on your prescription label.    Use the cap on the container to measure 15ml (fill cap to fill line)  Swish (gargle if you can) the mouthwash in your mouth for at least 30 seconds, (do not to swallow) spit out  After you use your CHG rinse, do not rinse your mouth with water, drink or eat.  Please refer to prescription label for the appropriate time to resume oral intake  Dental rinse comes in one size bottle: 473ml ~16oz.  You will have leftover    rinse, discard after this use.    What side effects might I have using the CHG oral/dental rinse?  CHG rinse will stick to plaque on the teeth.  Brush and floss just before use.  Teeth brushing will help avoid staining of plaque during use.    Who should I contact if I have questions about the CHG oral/dental rinse?  Please call [facility], Preadmission Testing at [phone] if you have any questions          Incentive Spirometer   You were provided with an incentive spirometer in CPM/PAT, please follow the below instructions.   You were not provided an incentive spirometer in CPM, please disregard the incentive spirometer instructions  What is an incentive spirometer?  An incentive spirometer is a device  used before and after surgery to “exercise” your lungs.  It helps you to take deeper breaths to expand your lungs.  Below is an example of a basic incentive spirometer.  The device you receive may differ slightly but they all function the same.    Why do I need to use an incentive spirometer?  Using your incentive spirometer prepares your lungs for surgery and helps prevent lung problems after surgery.  How do I use my incentive spirometer?  When you're using your incentive spirometer, make sure to breathe through your mouth. If you breathe through your nose, the incentive spirometer won't work properly. You can hold your nose if you have trouble.  If you feel dizzy at any time, stop and rest. Try again at a later time.  Follow the steps below:  Set up your incentive spirometer, expand the flexible tubing and connect to the outlet.  Sit upright in a chair or bed. Hold the incentive spirometer at eye level.   Put the mouthpiece in your mouth and close your lips tightly around it. Slowly breathe out (exhale) completely.  Breathe in (inhale) slowly through your mouth as deeply as you can. As you take a breath, you will see the piston rise inside the large column. While the piston rises, the indicator should move upwards. It should stay in between the 2 arrows (see Figure).  Try to get the piston as high as you can, while keeping the indicator between the arrows.   If the indicator doesn't stay between the arrows, you're breathing either too fast or too slow.  When you get it as high as you can, hold your breath for 10 seconds, or as long as possible. While you're holding your breath, the piston will slowly fall to the base of the spirometer.  Once the piston reaches the bottom of the spirometer, breathe out slowly through your mouth. Rest for a few seconds.  Repeat 10 times. Try to get the piston to the same level with each breath.  Repeat every hour while awake  You can carefully clean the outside of the mouthpiece  with an alcohol wipe or soap and water.

## 2025-06-10 NOTE — CPM/PAT NURSE NOTE
CPM/PAT Nurse Note      Name: Barbara SHARMA Magi (Barbara Sow)  /Age: 1960/65 y.o.       Medical History[1]    Surgical History[2]    Patient Sexual activity questions deferred to the physician.    Family History[3]    Allergies[4]    Prior to Admission medications    Medication Sig Start Date End Date Taking? Authorizing Provider   DULoxetine (Cymbalta) 60 mg DR capsule Take 1 capsule (60 mg) by mouth once daily. Do not crush or chew. 10/23/24 10/23/25 Yes Maninder Christensen MD   gabapentin (Neurontin) 600 mg tablet Take 2 tabs in the morning and 3 tabs at night. 25  Yes Giorgi Ledbetter MD   levothyroxine (Synthroid, Levoxyl) 100 mcg tablet Take 1 tablet (100 mcg) by mouth early in the morning.. Except on Sundays take 2 tabs 6/6/25 12/3/25 Yes Giorgi Ledbetter MD   ondansetron (Zofran) 8 mg tablet Take 1 tablet (8 mg) by mouth every 8 hours if needed for nausea or vomiting for up to 15 doses. 24  Yes Richie Rivas MD   chlorhexidine (Peridex) 0.12 % solution Rinse mouth with 15 ml after toothbrushing the night before surgery and on the morning of surgery.  Expectorate after rinsing.  Do not swallow. 25   Ingrid Valencia MD   metroNIDAZOLE (Flagyl) 250 mg tablet Take one tablet at 6p, 7p, and 11p the night before surgery. 25   Ingrid Valencia MD   neomycin (Mycifradin) 500 mg tablet Take two tabs by mouth at 6p, 7pm, and 11p the night before surgery. 25   Ingrid Valencia MD   nicotine (Nicoderm CQ) 7 mg/24 hr patch Place 1 patch over 24 hours on the skin once every 24 hours.  Patient not taking: Reported on 6/10/2025 10/23/24 12/22/24  Ingrid Valencia MD   polyethylene glycol (Glycolax, Miralax) 17 gram/dose powder Dissolve 17 grams in liquid as directed and take by mouth once daily.  Patient not taking: Mix of powder and drink. Reported on 6/10/2025 7/30/24   Sharee Freeman MD   gabapentin (Neurontin) 600 mg tablet Take 2 tabs in the morning and 3  tabs at night. 1/13/25 6/6/25  Maninder Christensen MD   levothyroxine (Synthroid, Levoxyl) 100 mcg tablet Take 1 tablet (100 mcg) by mouth early in the morning.. Except on Sundays take 2 tabs 4/10/25 6/6/25  Maninder Christensen MD        PAT ROS     DASI Risk Score    No data to display       Caprini DVT Assessment      Flowsheet Row ED to Hosp-Admission (Discharged) from 7/27/2024 in [address] with Sharee Freeman MD and Topher Malcolm DO ED to Hosp-Admission (Discharged) from 7/18/2024 in [address] with Sharee Freeman MD and Karly Villa MD   DVT Score (IF A SCORE IS NOT CALCULATING, MUST SELECT A BMI TO COMPLETE) 4 filed at 07/27/2024 0541 7 filed at 07/18/2024 1511   BMI (BMI MUST BE CHOSEN) 30 or less filed at 07/27/2024 0541 30 or less filed at 07/18/2024 1511   RETIRED: Current Status -- Major surgery planned, lasting 2-3 hours filed at 07/18/2024 1511   RETIRED: History Prior major surgery filed at 07/27/2024 0541 Prior major surgery filed at 07/18/2024 1511   RETIRED: Age 60-75 years filed at 07/27/2024 0541 60-75 years filed at 07/18/2024 1511          Modified Frailty Index    No data to display       [id number] Stroke Risk Points  Current as of just now        N/A 0 to 9 Points:      Last Change: N/A          The [id number] risk score (Lip MARAL, et al. 2009. © 2010 [employer]) quantifies the risk of stroke for a patient with atrial fibrillation. For patients without atrial fibrillation or under the age of 18 this score appears as N/A. Higher score values generally indicate higher risk of stroke.        This score is not applicable to this patient. Components are not calculated.          Revised Cardiac Risk Index    No data to display       Apfel Simplified Score    No data to display       Risk Analysis Index Results This Encounter    No data found in the last 10 encounters.       Prodigy: High Risk  Total Score: 8               Prodigy Age Score           ARISCAT Score for Postoperative Pulmonary Complications    No data to display       Alves Perioperative Risk for Myocardial Infarction or Cardiac Arrest (RICKY)    No data to display       ERAS kit given  Nurse Plan of Action:                [1]   Past Medical History:  Diagnosis Date    Anemia     3.8.25: H/H 10.9/35.3    Anxiety and depression     Cervical radiculopathy     Colon stricture (Multi)     Plan: Laparoscopic Takedown of Colostomy; Splenic Flexure Resection with Anastomosis; Closure Colostomy 12/9/24    DDD (degenerative disc disease), lumbar     Hypothyroidism     Ileus (Multi)     Large bowel obstruction (Multi)     s/p Exploration Laparotomy, transverse colostomy 7/19/24    OA (osteoarthritis)    [2]   Past Surgical History:  Procedure Laterality Date    APPENDECTOMY      CHOLECYSTECTOMY      COLONOSCOPY  08/15/2024    COLOSTOMY  07/19/2024    Exploration Laparotomy, transverse colostomy    HYSTERECTOMY     [3]   Family History  Problem Relation Name Age of Onset    No Known Problems Mother      Heart attack Father      Aneurysm Sister     [4]   Allergies  Allergen Reactions    Cefaclor Hives    Cefuroxime Axetil Hives    Ketorolac Headache

## 2025-06-10 NOTE — CPM/PAT H&P
[facility]/PAT Evaluation       Name: aBrbara Sow (Barbara Sow)  /Age: 1960/65 y.o.     In-Person         Date of Consult: 6/10/25    Referring Provider:  Dr. Valencia    Date, Surgery, and Length: 25, laparoscopic takedown of colostomy, splenic flexure resection, 180 minutes      Patient presents to [facility] for perioperative risk assessment prior to scheduled surgery. Patient presents with history of large bowel obstruction from splenic flexure stricture. S/P 2024 transverse colostomy.       This note was created in part upon personal review of patient's medical records.      Pt denies any past history of anesthetic complications such as PONV, awareness, prolonged sedation, dental damage, aspiration, cardiac arrest, difficult intubation, difficult I.V. access or unexpected hospital admissions. No history of malignant hyperthermia and or pseudocholinesterase deficiency.    No history of blood transfusions.    The patient IS NOT a Confucianism and will accept blood and blood products if medically indicated.     Type and screen WAS sent.      Past Medical History:   Diagnosis Date    Anemia     3.8.25: H/H 10.9/35.3    Anxiety and depression     Cervical radiculopathy     Colon stricture (Multi)     Plan: Laparoscopic Takedown of Colostomy; Splenic Flexure Resection with Anastomosis; Closure Colostomy 24    DDD (degenerative disc disease), lumbar     Hypothyroidism     Ileus (Multi)     Large bowel obstruction (Multi)     s/p Exploration Laparotomy, transverse colostomy 24    OA (osteoarthritis)        Past Surgical History:   Procedure Laterality Date    APPENDECTOMY      CHOLECYSTECTOMY      COLONOSCOPY  08/15/2024    COLOSTOMY  2024    Exploration Laparotomy, transverse colostomy    HYSTERECTOMY         Family History   Problem Relation Name Age of Onset    No Known Problems Mother      Heart attack Father      Aneurysm Sister         Allergies   Allergen Reactions     Cefaclor Hives    Cefuroxime Axetil Hives    Ketorolac Headache     Social History     Tobacco Use   Smoking Status Every Day    Current packs/day: 0.25    Average packs/day: 2.0 packs/day for 45.0 years (89.4 ttl pk-yrs)    Types: Cigarettes    Start date: 1980    Last attempt to quit: 09/2024   Smokeless Tobacco Never     Social History     Substance and Sexual Activity   Alcohol Use Not Currently     Social History     Substance and Sexual Activity   Drug Use Never       Current Outpatient Medications   Medication Instructions    chlorhexidine (Peridex) 0.12 % solution Rinse mouth with 15 ml after toothbrushing the night before surgery and on the morning of surgery.  Expectorate after rinsing.  Do not swallow.    DULoxetine (CYMBALTA) 60 mg, oral, Daily, Do not crush or chew.    gabapentin (Neurontin) 600 mg tablet Take 2 tabs in the morning and 3 tabs at night.    levothyroxine (SYNTHROID, LEVOXYL) 100 mcg, oral, Daily, Except on Sundays take 2 tabs    metroNIDAZOLE (Flagyl) 250 mg tablet Take one tablet at 6p, 7p, and 11p the night before surgery.    neomycin (Mycifradin) 500 mg tablet Take two tabs by mouth at 6p, 7pm, and 11p the night before surgery.    nicotine (Nicoderm CQ) 7 mg/24 hr patch 1 patch, transdermal, Every 24 hours    ondansetron (ZOFRAN) 8 mg, oral, Every 8 hours PRN    polyethylene glycol (Glycolax, Miralax) 17 gram/dose powder Dissolve 17 grams in liquid as directed and take by mouth once daily.       PAT ROS:   Constitutional:   neg    Neuro/Psych:   neg    Eyes:    use of corrective lenses  Ears:   neg    Nose:   neg    Mouth:    Upper dentures  Throat:   neg    Neck:   neg    Cardio:   neg    Respiratory:   neg    Endocrine:   neg    GI:    Midline colostomy   abdominal pain  :   neg    Musculoskeletal:    arthralgias  Hematologic:   neg    Skin:  neg        Physical Exam  Vitals reviewed. Physical exam normal: midline colostomy.         PAT AIRWAY:   Airway:     Mallampati::  III    TM  "distance::  >3 FB    Neck ROM::  Full   upper dentures      Visit Vitals  /72   Pulse 77   Temp 36.2 °C (97.1 °F)   Resp 16   Ht 1.7 m (5' 6.93\")   Wt 60 kg (132 lb 4.4 oz)   SpO2 95%   BMI 20.76 kg/m²   OB Status Hysterectomy   Smoking Status Every Day   BSA 1.68 m²     Assessment and Plan:     Patient is a 65 year old female scheduled for laparoscopic takedown of colostomy, splenic flexure resection on 6/16/25 with Dr. Valencia.    Plan    Neuro:    Depression and anxiety- continue Cymbalta    Cardiovascular:    RCRI: 0 Risk of Mace: 0.4%    Caprini: 5     Patient denies any chest pain, tightness, heaviness, pressure, radiating pain, palpitations, irregular heartbeats, lightheadedness, cough, congestion, shortness of breath, ROBERTS, PND, near syncope, weight loss or gain.    Good functional capacity    EKG in [facility]   Encounter Date: 06/10/25   ECG 12 Lead   Result Value    Ventricular Rate 81    Atrial Rate 81    AZ Interval 156    QRS Duration 90    QT Interval 428    QTC Calculation(Bazett) 497    P Axis 66    R Axis 7    T Axis 128    QRS Count 13    Q Onset 221    P Onset 143    P Offset 193    T Offset 435    QTC Fredericia 472    Narrative    Normal sinus rhythm  Low voltage QRS  Cannot rule out Anterior infarct (cited on or before 19-JUL-2024)  Abnormal ECG  When compared with ECG of 19-JUL-2024 10:52,  Questionable change in initial forces of Anterior leads  T wave inversion less evident in Anterior leads     Pulmonary:  No pulmonary diagnosis, however patient is at increased risk of perioperative complications secondary to  age > 60, Tobacco abuse, site of surgery, major surgery, duration of surgery > 2 hours, types of anesthetic  Stop Bang score is 2 placing patient at low risk for WIL  ARISCAT: 26-44 points, 13.3% risk of in-hospital postoperative pulmonary complication  PRODIGY: Moderate risk for opioid induced respiratory depression  Smoking cessation discussed with patient, patient also provided " cessation education/hotline handout, [facility] toilet education discussed, patient also provided deep breathing exercises and incentive spirometry educational handout    Patient has history of nicotine dependency. Smoking cessation education was provided to the patient.Cessation encouraged. - Physiological and physical aspects of tobacco addiction as well as strategies for quitting were discussed. - Counseling was given focusing on the harmful effects of this addiction especially given the patient's medical condition(s) which will be worsened because of the chemicals in tobacco      Renal/endo:  Recommendations to avoid nephrotoxic drugs and carefully monitor fluid status to maintain euvolemia. Use dose adjusted medications as needed for the underlying level of renal function.    Hypothyroidism- continue Levothyroxine       GI/:    Pending laparoscopic takedown of colostomy, splenic flexure resection  on 6/16/25    Heme:  Patient instructed to ambulate as soon as possible postoperatively to decrease thromboembolic risk.    Initiate mechanical DVT prophylaxis as soon as possible and initiate chemical prophylaxis when deemed safe from a bleeding standpoint post surgery.      Risk assessment complete.  This patient is INTERMEDIATE risk candidate undergoing MODERATE risk procedure, patient is medically optimized for surgery.      Labs/testing obtained in PAT on 6/10/25: CBC, BMP, T&S, MRSA, EKG    Lab Results   Component Value Date    WBC 6.4 06/10/2025    HGB 11.1 (L) 06/10/2025    HCT 34.5 (L) 06/10/2025    MCV 92 06/10/2025     06/10/2025     Lab Results   Component Value Date    GLUCOSE 77 06/10/2025    CALCIUM 9.4 06/10/2025     (L) 06/10/2025    K 4.1 06/10/2025    CO2 22 06/10/2025     06/10/2025    BUN 28 (H) 06/10/2025    CREATININE 0.72 06/10/2025     Component 1 d ago   ABO TYPE A   Rh TYPE POS   ANTIBODY SCREEN NEG   Resulting Agency [facility]             Specimen Collected: 06/10/25 13:52        Follow up/communication: none      Preoperative medication instructions were provided and reviewed with the patient.  Any additional testing or evaluation was explained to the patient.  Nothing by mouth instructions were discussed and patient's questions were answered prior to conclusion to this encounter.  Patient verbalized understanding of preoperative instructions given in preadmission testing; discharge instructions available in EMR.    This note was dictated with speech recognition.  Minor errors may have been detected during use of speech recognition.

## 2025-06-11 LAB
ATRIAL RATE: 81 BPM
P AXIS: 66 DEGREES
P OFFSET: 193 MS
P ONSET: 143 MS
PR INTERVAL: 156 MS
Q ONSET: 221 MS
QRS COUNT: 13 BEATS
QRS DURATION: 90 MS
QT INTERVAL: 428 MS
QTC CALCULATION(BAZETT): 497 MS
QTC FREDERICIA: 472 MS
R AXIS: 7 DEGREES
T AXIS: 128 DEGREES
T OFFSET: 435 MS
VENTRICULAR RATE: 81 BPM

## 2025-06-12 LAB — STAPHYLOCOCCUS SPEC CULT: NORMAL

## 2025-06-15 ENCOUNTER — ANESTHESIA EVENT (OUTPATIENT)
Dept: OPERATING ROOM | Facility: HOSPITAL | Age: 65
End: 2025-06-15
Payer: MEDICARE

## 2025-06-16 ENCOUNTER — HOSPITAL ENCOUNTER (INPATIENT)
Facility: HOSPITAL | Age: 65
DRG: 330 | End: 2025-06-16
Attending: COLON & RECTAL SURGERY | Admitting: COLON & RECTAL SURGERY
Payer: MEDICARE

## 2025-06-16 ENCOUNTER — ANESTHESIA (OUTPATIENT)
Dept: OPERATING ROOM | Facility: HOSPITAL | Age: 65
End: 2025-06-16
Payer: MEDICARE

## 2025-06-16 DIAGNOSIS — G89.18 ACUTE POST-OPERATIVE PAIN: ICD-10-CM

## 2025-06-16 DIAGNOSIS — K59.81 OGILVIE SYNDROME: ICD-10-CM

## 2025-06-16 DIAGNOSIS — K56.609 LARGE BOWEL OBSTRUCTION (MULTI): Primary | ICD-10-CM

## 2025-06-16 PROCEDURE — 9420000001 HC RT PATIENT EDUCATION 5 MIN

## 2025-06-16 PROCEDURE — 44213 LAP MOBIL SPLENIC FL ADD-ON: CPT | Performed by: COLON & RECTAL SURGERY

## 2025-06-16 PROCEDURE — A44204 PR LAP,SURG,COLECTOMY, PARTIAL, W/ANAST: Performed by: ANESTHESIOLOGY

## 2025-06-16 PROCEDURE — 44204 LAPARO PARTIAL COLECTOMY: CPT | Performed by: COLON & RECTAL SURGERY

## 2025-06-16 PROCEDURE — 88304 TISSUE EXAM BY PATHOLOGIST: CPT | Mod: TC,AHULAB,WESLAB | Performed by: COLON & RECTAL SURGERY

## 2025-06-16 PROCEDURE — 44620 REPAIR BOWEL OPENING: CPT | Performed by: COLON & RECTAL SURGERY

## 2025-06-16 PROCEDURE — 3700000002 HC GENERAL ANESTHESIA TIME - EACH INCREMENTAL 1 MINUTE: Performed by: COLON & RECTAL SURGERY

## 2025-06-16 PROCEDURE — 2500000001 HC RX 250 WO HCPCS SELF ADMINISTERED DRUGS (ALT 637 FOR MEDICARE OP): Performed by: ANESTHESIOLOGY

## 2025-06-16 PROCEDURE — 1100000001 HC PRIVATE ROOM DAILY

## 2025-06-16 PROCEDURE — 2500000004 HC RX 250 GENERAL PHARMACY W/ HCPCS (ALT 636 FOR OP/ED): Mod: JZ

## 2025-06-16 PROCEDURE — 2500000001 HC RX 250 WO HCPCS SELF ADMINISTERED DRUGS (ALT 637 FOR MEDICARE OP): Performed by: STUDENT IN AN ORGANIZED HEALTH CARE EDUCATION/TRAINING PROGRAM

## 2025-06-16 PROCEDURE — 3600000008 HC OR TIME - EACH INCREMENTAL 1 MINUTE - PROCEDURE LEVEL THREE: Performed by: COLON & RECTAL SURGERY

## 2025-06-16 PROCEDURE — 2500000002 HC RX 250 W HCPCS SELF ADMINISTERED DRUGS (ALT 637 FOR MEDICARE OP, ALT 636 FOR OP/ED): Performed by: STUDENT IN AN ORGANIZED HEALTH CARE EDUCATION/TRAINING PROGRAM

## 2025-06-16 PROCEDURE — 2500000004 HC RX 250 GENERAL PHARMACY W/ HCPCS (ALT 636 FOR OP/ED): Performed by: COLON & RECTAL SURGERY

## 2025-06-16 PROCEDURE — 0DBL0ZZ EXCISION OF TRANSVERSE COLON, OPEN APPROACH: ICD-10-PCS | Performed by: COLON & RECTAL SURGERY

## 2025-06-16 PROCEDURE — 7100000001 HC RECOVERY ROOM TIME - INITIAL BASE CHARGE: Performed by: COLON & RECTAL SURGERY

## 2025-06-16 PROCEDURE — 2500000005 HC RX 250 GENERAL PHARMACY W/O HCPCS: Performed by: ANESTHESIOLOGY

## 2025-06-16 PROCEDURE — 2500000004 HC RX 250 GENERAL PHARMACY W/ HCPCS (ALT 636 FOR OP/ED): Performed by: STUDENT IN AN ORGANIZED HEALTH CARE EDUCATION/TRAINING PROGRAM

## 2025-06-16 PROCEDURE — 2500000005 HC RX 250 GENERAL PHARMACY W/O HCPCS: Performed by: COLON & RECTAL SURGERY

## 2025-06-16 PROCEDURE — 99231 SBSQ HOSP IP/OBS SF/LOW 25: CPT

## 2025-06-16 PROCEDURE — 3700000001 HC GENERAL ANESTHESIA TIME - INITIAL BASE CHARGE: Performed by: COLON & RECTAL SURGERY

## 2025-06-16 PROCEDURE — 2720000007 HC OR 272 NO HCPCS: Performed by: COLON & RECTAL SURGERY

## 2025-06-16 PROCEDURE — A44204 PR LAP,SURG,COLECTOMY, PARTIAL, W/ANAST

## 2025-06-16 PROCEDURE — 7100000002 HC RECOVERY ROOM TIME - EACH INCREMENTAL 1 MINUTE: Performed by: COLON & RECTAL SURGERY

## 2025-06-16 PROCEDURE — 2500000004 HC RX 250 GENERAL PHARMACY W/ HCPCS (ALT 636 FOR OP/ED): Mod: JZ | Performed by: ANESTHESIOLOGY

## 2025-06-16 PROCEDURE — 3600000003 HC OR TIME - INITIAL BASE CHARGE - PROCEDURE LEVEL THREE: Performed by: COLON & RECTAL SURGERY

## 2025-06-16 PROCEDURE — 2500000004 HC RX 250 GENERAL PHARMACY W/ HCPCS (ALT 636 FOR OP/ED)

## 2025-06-16 RX ORDER — HYDROMORPHONE HYDROCHLORIDE 0.2 MG/ML
0.2 INJECTION INTRAMUSCULAR; INTRAVENOUS; SUBCUTANEOUS
Status: DISCONTINUED | OUTPATIENT
Start: 2025-06-16 | End: 2025-06-19

## 2025-06-16 RX ORDER — METRONIDAZOLE 500 MG/100ML
500 INJECTION, SOLUTION INTRAVENOUS ONCE
Status: COMPLETED | OUTPATIENT
Start: 2025-06-16 | End: 2025-06-16

## 2025-06-16 RX ORDER — OXYCODONE HYDROCHLORIDE 5 MG/1
5 TABLET ORAL EVERY 6 HOURS PRN
Status: DISCONTINUED | OUTPATIENT
Start: 2025-06-16 | End: 2025-06-18

## 2025-06-16 RX ORDER — ROCURONIUM BROMIDE 10 MG/ML
INJECTION, SOLUTION INTRAVENOUS AS NEEDED
Status: DISCONTINUED | OUTPATIENT
Start: 2025-06-16 | End: 2025-06-16

## 2025-06-16 RX ORDER — METHOCARBAMOL 100 MG/ML
INJECTION, SOLUTION INTRAMUSCULAR; INTRAVENOUS AS NEEDED
Status: DISCONTINUED | OUTPATIENT
Start: 2025-06-16 | End: 2025-06-16

## 2025-06-16 RX ORDER — HEPARIN SODIUM 5000 [USP'U]/ML
5000 INJECTION, SOLUTION INTRAVENOUS; SUBCUTANEOUS ONCE
Status: COMPLETED | OUTPATIENT
Start: 2025-06-16 | End: 2025-06-16

## 2025-06-16 RX ORDER — ACETAMINOPHEN 325 MG/1
650 TABLET ORAL EVERY 6 HOURS
Status: DISCONTINUED | OUTPATIENT
Start: 2025-06-16 | End: 2025-06-25 | Stop reason: HOSPADM

## 2025-06-16 RX ORDER — ONDANSETRON 4 MG/1
4 TABLET, FILM COATED ORAL EVERY 8 HOURS PRN
Status: DISCONTINUED | OUTPATIENT
Start: 2025-06-16 | End: 2025-06-25 | Stop reason: HOSPADM

## 2025-06-16 RX ORDER — LEVOFLOXACIN 5 MG/ML
500 INJECTION, SOLUTION INTRAVENOUS ONCE
Status: COMPLETED | OUTPATIENT
Start: 2025-06-16 | End: 2025-06-16

## 2025-06-16 RX ORDER — ONDANSETRON HYDROCHLORIDE 2 MG/ML
INJECTION, SOLUTION INTRAVENOUS AS NEEDED
Status: DISCONTINUED | OUTPATIENT
Start: 2025-06-16 | End: 2025-06-16

## 2025-06-16 RX ORDER — MIDAZOLAM HYDROCHLORIDE 1 MG/ML
INJECTION INTRAMUSCULAR; INTRAVENOUS AS NEEDED
Status: DISCONTINUED | OUTPATIENT
Start: 2025-06-16 | End: 2025-06-16

## 2025-06-16 RX ORDER — METHOCARBAMOL 100 MG/ML
1000 INJECTION, SOLUTION INTRAMUSCULAR; INTRAVENOUS EVERY 8 HOURS PRN
Status: DISPENSED | OUTPATIENT
Start: 2025-06-16 | End: 2025-06-17

## 2025-06-16 RX ORDER — SODIUM CHLORIDE, SODIUM LACTATE, POTASSIUM CHLORIDE, CALCIUM CHLORIDE 600; 310; 30; 20 MG/100ML; MG/100ML; MG/100ML; MG/100ML
75 INJECTION, SOLUTION INTRAVENOUS CONTINUOUS
Status: ACTIVE | OUTPATIENT
Start: 2025-06-16 | End: 2025-06-17

## 2025-06-16 RX ORDER — LIDOCAINE HYDROCHLORIDE 20 MG/ML
INJECTION, SOLUTION EPIDURAL; INFILTRATION; INTRACAUDAL; PERINEURAL AS NEEDED
Status: DISCONTINUED | OUTPATIENT
Start: 2025-06-16 | End: 2025-06-16

## 2025-06-16 RX ORDER — OXYCODONE HYDROCHLORIDE 5 MG/1
5 TABLET ORAL EVERY 4 HOURS PRN
Status: DISCONTINUED | OUTPATIENT
Start: 2025-06-16 | End: 2025-06-16 | Stop reason: HOSPADM

## 2025-06-16 RX ORDER — ONDANSETRON HYDROCHLORIDE 2 MG/ML
4 INJECTION, SOLUTION INTRAVENOUS EVERY 8 HOURS PRN
Status: DISCONTINUED | OUTPATIENT
Start: 2025-06-16 | End: 2025-06-25 | Stop reason: HOSPADM

## 2025-06-16 RX ORDER — LABETALOL HYDROCHLORIDE 5 MG/ML
INJECTION, SOLUTION INTRAVENOUS AS NEEDED
Status: DISCONTINUED | OUTPATIENT
Start: 2025-06-16 | End: 2025-06-16

## 2025-06-16 RX ORDER — SODIUM CHLORIDE, SODIUM LACTATE, POTASSIUM CHLORIDE, CALCIUM CHLORIDE 600; 310; 30; 20 MG/100ML; MG/100ML; MG/100ML; MG/100ML
100 INJECTION, SOLUTION INTRAVENOUS CONTINUOUS
Status: DISCONTINUED | OUTPATIENT
Start: 2025-06-16 | End: 2025-06-16 | Stop reason: HOSPADM

## 2025-06-16 RX ORDER — SODIUM CHLORIDE, SODIUM LACTATE, POTASSIUM CHLORIDE, CALCIUM CHLORIDE 600; 310; 30; 20 MG/100ML; MG/100ML; MG/100ML; MG/100ML
INJECTION, SOLUTION INTRAVENOUS CONTINUOUS PRN
Status: DISCONTINUED | OUTPATIENT
Start: 2025-06-16 | End: 2025-06-16

## 2025-06-16 RX ORDER — ACETAMINOPHEN 325 MG/1
650 TABLET ORAL EVERY 4 HOURS PRN
Status: DISCONTINUED | OUTPATIENT
Start: 2025-06-16 | End: 2025-06-16 | Stop reason: HOSPADM

## 2025-06-16 RX ORDER — SODIUM CHLORIDE 0.9 G/100ML
INJECTION, SOLUTION IRRIGATION AS NEEDED
Status: DISCONTINUED | OUTPATIENT
Start: 2025-06-16 | End: 2025-06-16 | Stop reason: HOSPADM

## 2025-06-16 RX ORDER — NALOXONE HYDROCHLORIDE 0.4 MG/ML
0.2 INJECTION, SOLUTION INTRAMUSCULAR; INTRAVENOUS; SUBCUTANEOUS EVERY 5 MIN PRN
Status: DISCONTINUED | OUTPATIENT
Start: 2025-06-16 | End: 2025-06-25 | Stop reason: HOSPADM

## 2025-06-16 RX ORDER — FENTANYL CITRATE 50 UG/ML
INJECTION, SOLUTION INTRAMUSCULAR; INTRAVENOUS AS NEEDED
Status: DISCONTINUED | OUTPATIENT
Start: 2025-06-16 | End: 2025-06-16

## 2025-06-16 RX ORDER — GABAPENTIN 300 MG/1
300 CAPSULE ORAL 3 TIMES DAILY
Status: DISCONTINUED | OUTPATIENT
Start: 2025-06-16 | End: 2025-06-17

## 2025-06-16 RX ORDER — LEVOTHYROXINE SODIUM 100 UG/1
100 TABLET ORAL DAILY
Status: DISCONTINUED | OUTPATIENT
Start: 2025-06-16 | End: 2025-06-25 | Stop reason: HOSPADM

## 2025-06-16 RX ORDER — ENOXAPARIN SODIUM 100 MG/ML
40 INJECTION SUBCUTANEOUS EVERY 24 HOURS
Status: DISCONTINUED | OUTPATIENT
Start: 2025-06-17 | End: 2025-06-25 | Stop reason: HOSPADM

## 2025-06-16 RX ORDER — SCOPOLAMINE 1 MG/3D
1 PATCH, EXTENDED RELEASE TRANSDERMAL
Status: DISCONTINUED | OUTPATIENT
Start: 2025-06-16 | End: 2025-06-25 | Stop reason: HOSPADM

## 2025-06-16 RX ORDER — OXYCODONE HYDROCHLORIDE 5 MG/1
10 TABLET ORAL EVERY 6 HOURS PRN
Status: DISCONTINUED | OUTPATIENT
Start: 2025-06-16 | End: 2025-06-18

## 2025-06-16 RX ORDER — PROPOFOL 10 MG/ML
INJECTION, EMULSION INTRAVENOUS AS NEEDED
Status: DISCONTINUED | OUTPATIENT
Start: 2025-06-16 | End: 2025-06-16

## 2025-06-16 RX ORDER — HYDROMORPHONE HYDROCHLORIDE 1 MG/ML
INJECTION, SOLUTION INTRAMUSCULAR; INTRAVENOUS; SUBCUTANEOUS AS NEEDED
Status: DISCONTINUED | OUTPATIENT
Start: 2025-06-16 | End: 2025-06-16

## 2025-06-16 RX ORDER — LIDOCAINE HYDROCHLORIDE 10 MG/ML
0.1 INJECTION, SOLUTION EPIDURAL; INFILTRATION; INTRACAUDAL; PERINEURAL ONCE
Status: DISCONTINUED | OUTPATIENT
Start: 2025-06-16 | End: 2025-06-16 | Stop reason: HOSPADM

## 2025-06-16 RX ADMIN — SODIUM CHLORIDE, SODIUM LACTATE, POTASSIUM CHLORIDE, AND CALCIUM CHLORIDE 75 ML/HR: .6; .31; .03; .02 INJECTION, SOLUTION INTRAVENOUS at 12:15

## 2025-06-16 RX ADMIN — ROCURONIUM BROMIDE 70 MG: 10 INJECTION, SOLUTION INTRAVENOUS at 07:37

## 2025-06-16 RX ADMIN — HYDROMORPHONE HYDROCHLORIDE 0.5 MG: 1 INJECTION, SOLUTION INTRAMUSCULAR; INTRAVENOUS; SUBCUTANEOUS at 10:14

## 2025-06-16 RX ADMIN — MIDAZOLAM HYDROCHLORIDE 2 MG: 1 INJECTION, SOLUTION INTRAMUSCULAR; INTRAVENOUS at 07:30

## 2025-06-16 RX ADMIN — METHOCARBAMOL 250 MG: 100 INJECTION INTRAMUSCULAR; INTRAVENOUS at 09:08

## 2025-06-16 RX ADMIN — Medication 6 L/MIN: at 09:50

## 2025-06-16 RX ADMIN — HYDROMORPHONE HYDROCHLORIDE 0.2 MG: 0.2 INJECTION, SOLUTION INTRAMUSCULAR; INTRAVENOUS; SUBCUTANEOUS at 20:42

## 2025-06-16 RX ADMIN — OXYCODONE HYDROCHLORIDE 10 MG: 5 TABLET ORAL at 22:59

## 2025-06-16 RX ADMIN — GABAPENTIN 300 MG: 300 CAPSULE ORAL at 20:42

## 2025-06-16 RX ADMIN — METHOCARBAMOL 250 MG: 100 INJECTION INTRAMUSCULAR; INTRAVENOUS at 09:13

## 2025-06-16 RX ADMIN — DEXAMETHASONE SODIUM PHOSPHATE 8 MG: 4 INJECTION, SOLUTION INTRAMUSCULAR; INTRAVENOUS at 07:37

## 2025-06-16 RX ADMIN — FENTANYL CITRATE 50 MCG: 50 INJECTION, SOLUTION INTRAMUSCULAR; INTRAVENOUS at 07:35

## 2025-06-16 RX ADMIN — ACETAMINOPHEN 650 MG: 325 TABLET, FILM COATED ORAL at 12:15

## 2025-06-16 RX ADMIN — PROPOFOL 200 MG: 10 INJECTION, EMULSION INTRAVENOUS at 07:36

## 2025-06-16 RX ADMIN — SUGAMMADEX 200 MG: 100 INJECTION, SOLUTION INTRAVENOUS at 09:41

## 2025-06-16 RX ADMIN — HYDROMORPHONE HYDROCHLORIDE 0.5 MG: 1 INJECTION, SOLUTION INTRAMUSCULAR; INTRAVENOUS; SUBCUTANEOUS at 09:58

## 2025-06-16 RX ADMIN — OXYCODONE HYDROCHLORIDE 5 MG: 5 TABLET ORAL at 10:30

## 2025-06-16 RX ADMIN — GABAPENTIN 300 MG: 300 CAPSULE ORAL at 16:31

## 2025-06-16 RX ADMIN — METHOCARBAMOL 250 MG: 100 INJECTION INTRAMUSCULAR; INTRAVENOUS at 09:17

## 2025-06-16 RX ADMIN — LABETALOL HYDROCHLORIDE 10 MG: 5 INJECTION INTRAVENOUS at 08:39

## 2025-06-16 RX ADMIN — Medication 3 L/MIN: at 10:00

## 2025-06-16 RX ADMIN — HEPARIN SODIUM 5000 UNITS: 5000 INJECTION, SOLUTION INTRAVENOUS; SUBCUTANEOUS at 06:55

## 2025-06-16 RX ADMIN — METRONIDAZOLE 500 MG: 500 INJECTION, SOLUTION INTRAVENOUS at 07:45

## 2025-06-16 RX ADMIN — METHOCARBAMOL 250 MG: 100 INJECTION INTRAMUSCULAR; INTRAVENOUS at 09:21

## 2025-06-16 RX ADMIN — LEVOTHYROXINE SODIUM 100 MCG: 0.1 TABLET ORAL at 12:15

## 2025-06-16 RX ADMIN — LIDOCAINE HYDROCHLORIDE 100 MG: 20 INJECTION, SOLUTION EPIDURAL; INFILTRATION; INTRACAUDAL; PERINEURAL at 07:35

## 2025-06-16 RX ADMIN — HYDROMORPHONE HYDROCHLORIDE 0.5 MG: 1 INJECTION, SOLUTION INTRAMUSCULAR; INTRAVENOUS; SUBCUTANEOUS at 08:30

## 2025-06-16 RX ADMIN — HYDROMORPHONE HYDROCHLORIDE 0.25 MG: 1 INJECTION, SOLUTION INTRAMUSCULAR; INTRAVENOUS; SUBCUTANEOUS at 09:49

## 2025-06-16 RX ADMIN — ROCURONIUM BROMIDE 20 MG: 10 INJECTION, SOLUTION INTRAVENOUS at 08:26

## 2025-06-16 RX ADMIN — HYDROMORPHONE HYDROCHLORIDE 0.25 MG: 1 INJECTION, SOLUTION INTRAMUSCULAR; INTRAVENOUS; SUBCUTANEOUS at 09:20

## 2025-06-16 RX ADMIN — LEVOFLOXACIN 500 MG: 500 INJECTION, SOLUTION INTRAVENOUS at 07:45

## 2025-06-16 RX ADMIN — HYDROMORPHONE HYDROCHLORIDE 0.2 MG: 0.2 INJECTION, SOLUTION INTRAMUSCULAR; INTRAVENOUS; SUBCUTANEOUS at 13:20

## 2025-06-16 RX ADMIN — ACETAMINOPHEN 650 MG: 325 TABLET, FILM COATED ORAL at 20:42

## 2025-06-16 RX ADMIN — SODIUM CHLORIDE, POTASSIUM CHLORIDE, SODIUM LACTATE AND CALCIUM CHLORIDE: 600; 310; 30; 20 INJECTION, SOLUTION INTRAVENOUS at 07:30

## 2025-06-16 RX ADMIN — OXYCODONE HYDROCHLORIDE 10 MG: 5 TABLET ORAL at 16:29

## 2025-06-16 RX ADMIN — FENTANYL CITRATE 50 MCG: 50 INJECTION, SOLUTION INTRAMUSCULAR; INTRAVENOUS at 08:00

## 2025-06-16 RX ADMIN — SCOPOLAMINE 1 PATCH: 1.5 PATCH, EXTENDED RELEASE TRANSDERMAL at 12:15

## 2025-06-16 RX ADMIN — ONDANSETRON 4 MG: 2 INJECTION, SOLUTION INTRAMUSCULAR; INTRAVENOUS at 09:14

## 2025-06-16 SDOH — HEALTH STABILITY: MENTAL HEALTH: CURRENT SMOKER: 1

## 2025-06-16 ASSESSMENT — PAIN SCALES - GENERAL
PAINLEVEL_OUTOF10: 9
PAINLEVEL_OUTOF10: 10 - WORST POSSIBLE PAIN
PAINLEVEL_OUTOF10: 5 - MODERATE PAIN
PAINLEVEL_OUTOF10: 6
PAINLEVEL_OUTOF10: 0 - NO PAIN
PAINLEVEL_OUTOF10: 6
PAINLEVEL_OUTOF10: 9
PAINLEVEL_OUTOF10: 10 - WORST POSSIBLE PAIN
PAINLEVEL_OUTOF10: 10 - WORST POSSIBLE PAIN
PAINLEVEL_OUTOF10: 7
PAINLEVEL_OUTOF10: 10 - WORST POSSIBLE PAIN
PAINLEVEL_OUTOF10: 9
PAINLEVEL_OUTOF10: 10 - WORST POSSIBLE PAIN

## 2025-06-16 ASSESSMENT — PAIN - FUNCTIONAL ASSESSMENT
PAIN_FUNCTIONAL_ASSESSMENT: 0-10
PAIN_FUNCTIONAL_ASSESSMENT: UNABLE TO SELF-REPORT
PAIN_FUNCTIONAL_ASSESSMENT: 0-10
PAIN_FUNCTIONAL_ASSESSMENT: 0-10
PAIN_FUNCTIONAL_ASSESSMENT: UNABLE TO SELF-REPORT
PAIN_FUNCTIONAL_ASSESSMENT: UNABLE TO SELF-REPORT
PAIN_FUNCTIONAL_ASSESSMENT: 0-10
PAIN_FUNCTIONAL_ASSESSMENT: 0-10
PAIN_FUNCTIONAL_ASSESSMENT: UNABLE TO SELF-REPORT
PAIN_FUNCTIONAL_ASSESSMENT: 0-10

## 2025-06-16 ASSESSMENT — PAIN DESCRIPTION - ORIENTATION
ORIENTATION: MID;UPPER
ORIENTATION: MID
ORIENTATION: MID;LEFT

## 2025-06-16 ASSESSMENT — PAIN DESCRIPTION - LOCATION
LOCATION: ABDOMEN

## 2025-06-16 ASSESSMENT — PAIN DESCRIPTION - DESCRIPTORS
DESCRIPTORS: SORE
DESCRIPTORS: SORE
DESCRIPTORS: DISCOMFORT;JABBING
DESCRIPTORS: DISCOMFORT;ACHING
DESCRIPTORS: SORE
DESCRIPTORS: SORE

## 2025-06-16 ASSESSMENT — ACTIVITIES OF DAILY LIVING (ADL): LACK_OF_TRANSPORTATION: NO

## 2025-06-16 NOTE — PROGRESS NOTES
06/16/25 1413   [facility] Disability Status   Are you deaf or do you have serious difficulty hearing? N   Are you blind or do you have serious difficulty seeing, even when wearing glasses? N   Because of a physical, mental, or emotional condition, do you have serious difficulty concentrating, remembering, or making decisions? (5 years old or older) N   Do you have serious difficulty walking or climbing stairs? N   Do you have serious difficulty dressing or bathing? N   Because of a physical, mental, or emotional condition, do you have serious difficulty doing errands alone such as visiting the doctor? N

## 2025-06-16 NOTE — ANESTHESIA PREPROCEDURE EVALUATION
Patient: Barbara Sow    Procedure Information       Date/Time: 06/16/25 0705    Procedures:       Laparoscopic Takedown of Colostomy; Splenic Flexure Resection (Abdomen)      Laparoscopic Takedown of Colostomy; Splenic Flexure Resection (Abdomen)    Location: [facility] A OR 04 / [facility] A OR    Surgeons: Ingrid Valencia MD            Relevant Problems   Neuro   (+) Anxiety   (+) Bilateral sciatica   (+) Right cervical radiculopathy   (+) Right lumbar radiculopathy      GI   (+) Lower GI bleed      Endocrine   (+) Hypothyroidism      Musculoskeletal   (+) Lumbar degenerative disc disease   (+) OA (osteoarthritis) of knee   (+) Osteoarthritis of finger      HEENT   (+) Acute sinusitis      ID   (+) Cold sore       Clinical information reviewed:                    Medical History[1]   Surgical History[2]  Social History[3]   Current Outpatient Medications   Medication Instructions    chlorhexidine (Peridex) 0.12 % solution Rinse mouth with 15 ml after toothbrushing the night before surgery and on the morning of surgery.  Expectorate after rinsing.  Do not swallow.    DULoxetine (CYMBALTA) 60 mg, oral, Daily, Do not crush or chew.    gabapentin (Neurontin) 600 mg tablet Take 2 tabs in the morning and 3 tabs at night.    levothyroxine (SYNTHROID, LEVOXYL) 100 mcg, oral, Daily, Except on Sundays take 2 tabs    metroNIDAZOLE (Flagyl) 250 mg tablet Take one tablet at 6p, 7p, and 11p the night before surgery.    neomycin (Mycifradin) 500 mg tablet Take two tabs by mouth at 6p, 7pm, and 11p the night before surgery.    nicotine (Nicoderm CQ) 7 mg/24 hr patch 1 patch, transdermal, Every 24 hours    ondansetron (ZOFRAN) 8 mg, oral, Every 8 hours PRN    polyethylene glycol (Glycolax, Miralax) 17 gram/dose powder Dissolve 17 grams in liquid as directed and take by mouth once daily.      RX Allergies[4]     Chemistry    Lab Results   Component Value Date/Time     (L) 06/10/2025 1352    K 4.1 06/10/2025 1352    CL  101 06/10/2025 1352    CO2 22 06/10/2025 1352    BUN 28 (H) 06/10/2025 1352    CREATININE 0.72 06/10/2025 1352    Lab Results   Component Value Date/Time    CALCIUM 9.4 06/10/2025 1352    ALKPHOS 86 03/08/2025 0238    AST 18 03/08/2025 0238    ALT 16 03/08/2025 0238    BILITOT 0.2 03/08/2025 0238          Lab Results   Component Value Date    HGBA1C 5.6 04/25/2024     Lab Results   Component Value Date/Time    WBC 6.4 06/10/2025 1352    HGB 11.1 (L) 06/10/2025 1352    HCT 34.5 (L) 06/10/2025 1352     06/10/2025 1352    ABO A 06/10/2025 1352    LABRH POS 06/10/2025 1352    ABSCRN NEG 06/10/2025 1352     Lab Results   Component Value Date/Time    PROTIME 11.6 07/19/2024 1114    INR 1.0 07/19/2024 1114     Encounter Date: 06/10/25   ECG 12 Lead   Result Value    Ventricular Rate 81    Atrial Rate 81    MO Interval 156    QRS Duration 90    QT Interval 428    QTC Calculation(Bazett) 497    P Axis 66    R Axis 7    T Axis 128    QRS Count 13    Q Onset 221    P Onset 143    P Offset 193    T Offset 435    QTC Fredericia 472    Narrative    Normal sinus rhythm  Low voltage QRS  Cannot rule out Anterior infarct (cited on or before 19-JUL-2024)  Abnormal ECG  When compared with ECG of 19-JUL-2024 10:52,  Questionable change in initial forces of Anterior leads  T wave inversion less evident in Anterior leads      No results found for this or any previous visit from the past 1095 days.        Visit Vitals  OB Status Hysterectomy   Smoking Status Every Day     No data recorded    Physical Exam    Airway  Mallampati: II  TM distance: >3 FB  Neck ROM: full  Mouth opening: 3 or more finger widths     Cardiovascular - normal exam   Dental - normal exam     Pulmonary - normal exam   Abdominal - normal exam           Anesthesia Plan    History of general anesthesia?: yes  History of complications of general anesthesia?: no    ASA 2     general     The patient is a current smoker.    intravenous induction   Postoperative pain  plan includes opioids.  Anesthetic plan and risks discussed with patient.  Use of blood products discussed with patient who.    Plan discussed with CAA and attending.             [1]   Past Medical History:  Diagnosis Date    Anemia     3.8.25: H/H 10.9/35.3    Anxiety and depression     Cervical radiculopathy     Colon stricture (Multi)     Plan: Laparoscopic Takedown of Colostomy; Splenic Flexure Resection with Anastomosis; Closure Colostomy 12/9/24    DDD (degenerative disc disease), lumbar     Hypothyroidism     Ileus (Multi)     Large bowel obstruction (Multi)     s/p Exploration Laparotomy, transverse colostomy 7/19/24    OA (osteoarthritis)    [2]   Past Surgical History:  Procedure Laterality Date    APPENDECTOMY      CHOLECYSTECTOMY      COLONOSCOPY  08/15/2024    COLOSTOMY  07/19/2024    Exploration Laparotomy, transverse colostomy    HYSTERECTOMY     [3]   Social History  Tobacco Use    Smoking status: Every Day     Current packs/day: 0.25     Average packs/day: 2.0 packs/day for 45.0 years (89.4 ttl pk-yrs)     Types: Cigarettes     Start date: 1980     Last attempt to quit: 09/2024    Smokeless tobacco: Never   Vaping Use    Vaping status: Never Used   Substance Use Topics    Alcohol use: Not Currently    Drug use: Never   [4]   Allergies  Allergen Reactions    Cefaclor Hives    Cefuroxime Axetil Hives    Ketorolac Headache

## 2025-06-16 NOTE — OP NOTE
Takedown of Colostomy; Splenic Flexure Resection; Laparoscopic colon resection Operative Note     Date: 2025  OR Location: [facility] OR    Name: Barbara Sow, : 1960, Age: 65 y.o., MRN: [id number], Sex: female    Diagnosis  Pre-op Diagnosis      * Large bowel obstruction (Multi) [K56.609] Post-op Diagnosis     * Large bowel obstruction (Multi) [K56.609]     Procedures  Lx splenic flexure takedown  Laparoscopic splenic flexure resection   Colostomy closure   SAMI block    Surgeons      * Ingrid Valencia - Primary    Resident/Fellow/Other Assistant:  Surgeons and Role:  * No surgeons found with a matching role *    Staff:   Surgical Assistant:   Circulator: Hansel Frankub Person: Juanita Chowdhury Circulator: Rupert Chowdhury Scrub: Shabnam Chowdhury Circulator: Nely    Anesthesia Staff: Anesthesiologist: MD FRED Jacobo-AA: YESIKA Stokes; YESIKA Prince  DEDE: Gayathri Caruso    Procedure Summary  Anesthesia: General  ASA: II  Estimated Blood Loss: 25mL  Intra-op Medications:   Administrations occurring from 0705 to 1005 on 25:   Medication Name Total Dose   sodium chloride 0.9 % irrigation solution 1,000 mL   BUPivacaine HCl (Marcaine) 0.5 % (5 mg/mL) 30 mL in sodium chloride 0.9% 30 mL syringe 48 mL   dexAMETHasone (Decadron) 4 mg/mL IV Syringe 2 mL 8 mg   fentaNYL (Sublimaze) injection 50 mcg/mL 100 mcg   HYDROmorphone (Dilaudid) injection 1 mg/mL 0.75 mg   labetalol (Normodyne,Trandate) injection 10 mg   lactated Ringer's infusion Cannot be calculated   lidocaine PF (Xylocaine-MPF) local injection 2 % 100 mg   methocarbamol (Robaxin) 100 mg/mL 250 mg   midazolam PF (Versed) injection 1 mg/mL 2 mg   ondansetron (Zofran) 2 mg/mL injection 4 mg   propofol (Diprivan) injection 10 mg/mL 200 mg   rocuronium (ZeMuron) 50 mg/5 mL injection 90 mg   sugammadex (Bridion) 200 mg/2 mL injection 200 mg   levoFLOXacin (Levaquin) 500 mg in dextrose 5%  mL 500 mg   metroNIDAZOLE (Flagyl) 500 mg in  sodium chloride (iso)  mL 500 mg              Anesthesia Record               Intraprocedure I/O Totals          Output    Est. Blood Loss 25 mL    Total Output 25 mL          Specimen:   ID Type Source Tests Collected by Time   1 : Colonic stricture, splenic flexure Tissue COLON - SPLENIC FLEXURE POLYP SURGICAL PATHOLOGY EXAM Ingrid Valencia MD 6/16/2025 0833                 Drains and/or Catheters: * None in log *    Findings: Minimal adhesions, tattoo proximal and distal to flexure noted.  Colon resection with great bleeding at the colon edge and     Indications: Barbara Sow is an 65 y.o. female who is having surgery for Large bowel obstruction (Multi) [K56.609]. Colonic stricture    The patient was seen in the preoperative area. The risks, benefits, complications, treatment options, non-operative alternatives, expected recovery and outcomes were discussed with the patient. The possibilities of reaction to medication, pulmonary aspiration, injury to surrounding structures, bleeding, recurrent infection, the need for additional procedures, failure to diagnose a condition, and creating a complication requiring transfusion or operation were discussed with the patient. The patient concurred with the proposed plan, giving informed consent.  The site of surgery was properly noted/marked if necessary per policy. The patient has been actively warmed in preoperative area. Preoperative antibiotics have been ordered and given within 1 hours of incision. Venous thrombosis prophylaxis have been ordered including bilateral sequential compression devices and chemical prophylaxis    Procedure Details: Patient was brought to the operating room in the supine position.  The huddle was performed, and general endotracheal anesthesia was induced.  IV antibiotics and a heparin shot were administered, and the patient was prepped using a combination of liquid Betadine and ChloraPrep and taped and draped in the usual  sterile fashion.    The stoma was instilled using 0.5% marcaine.  A circumferential incision was made around the stoma using the Bovie cautery the subcutaneous tissues were divided using Metzenbaum scissors and Bovie cautery.  The peritoneal cavity was entered and the stoma was dissected free from the surrounding fascial tissues.  No serosal tears were created.      The stoma was everted and the skin was excised and discarded.  A handsewn end to end anastomosis was created.   The bowel was reapproximated using 3.0 Vicryl seromuscular stitches, followed by 3.0 Ethibond full thickness sutures.  The bowel was stool and airtight and was returned to the peritoneal cavity there were minimal adhesions.  The omentum was moved over the stomach and the lesser sac was entered and developed using the Ligasure.  The bowel was returned to the peritoneal cavity, and the Gel point was placed with 2-10mm ports and a 12 mm port.  The lesser sac was identified and the omentum was taken off the transverse colon to the splenic flexure at the tattoo.  The splenocolic attachments were scored and the splenic flexure was easily medialized off Gerota's fascia.  The lateral attachments were scored and the left colon was medialized to the sigmoid.  The colon was dissected free from Gerota's fascia to the vessels and transverse and left colon were completely freed the blood vessels were left in place.      The colon was grasped and easily brought out of the wound protector.  The transverse colon was transected proximal to the tattoo.  The colon was open with bleeding and pink mucosa.  The left colon was transected and was open, healthy and pink with pulsatile bleeding, both ends of the colon were the same size.  The ligasure was used to take the mesentery and the section of stricture was removed and sent as splenic flexure.  A handsewn end to end anastomosis was created. The mesenteric aspect of the bowel was reapproximated using 3.0 Vicryl  vertical mattress (Addy) sutures.  The antimesenteric bowel was approximated using 3.0 Vicryl seromuscular sutures followed by 3.0 Ethibond full thickness sutures.  The colon was returned to the peritoneal cavity and placed in the LUQ. TAP block was performed instilling 10cc 0.25% marcaine in the transversus abdominus plane  in the RUQ, RLQ, LUQ and LLQ under direct vision.        The fascia was then closed transversely using interrupted figure-of-eight 0 PDS sutures the area was instilled using the remainder of the 0.5% Marcaine.  The skin was pursestringed down using 3.0 Vicryl and covered using a Primapore and ABD and tape.      Evidence of Infection: No   Complications:  None; patient tolerated the procedure well.    Disposition: PACU - hemodynamically stable.  Condition: stable           Additional Details: None    Attending Attestation: I was present and scrubbed for the entire procedure.    Ingrid Valencia  Phone Number: [phone]

## 2025-06-16 NOTE — ANESTHESIA POSTPROCEDURE EVALUATION
Patient: Barbara Sow    Procedure Summary       Date: 06/16/25 Room / Location: [facility] OR 04 / [facility] OR    Anesthesia Start: 0730 Anesthesia Stop: 0957    Procedure: Takedown of Colostomy; Splenic Flexure Resection; Laparoscopic colon resection (Abdomen) Diagnosis:       Large bowel obstruction (Multi)      (Large bowel obstruction (Multi) [K56.609])    Surgeons: Ingrid Valencia MD Responsible Provider: Lino Calvo MD    Anesthesia Type: general ASA Status: 2            Anesthesia Type: general    Vitals Value Taken Time   /94 06/16/25 10:00   Temp 36.9 °C (98.4 °F) 06/16/25 10:00   Pulse 73 06/16/25 10:00   Resp 17 06/16/25 10:00   SpO2 95 % 06/16/25 10:00       Anesthesia Post Evaluation    Patient location during evaluation: PACU  Patient participation: complete - patient participated  Level of consciousness: awake  Pain management: adequate  Airway patency: patent  Cardiovascular status: acceptable  Respiratory status: acceptable  Hydration status: acceptable  Postoperative Nausea and Vomiting: none        There were no known notable events for this encounter.

## 2025-06-16 NOTE — CARE PLAN
The patient's goals for the shift include      The clinical goals for the shift include Patient pain will be maintained throughout shift    Problem: Pain - Adult  Goal: Verbalizes/displays adequate comfort level or baseline comfort level  6/16/2025 1952 by Kaye Ruiz RN  Outcome: Progressing  6/16/2025 1951 by Kaye Ruiz RN  Outcome: Progressing     Problem: Safety - Adult  Goal: Free from fall injury  6/16/2025 1952 by Kaye Ruiz RN  Outcome: Progressing  6/16/2025 1951 by Kaye Ruiz RN  Outcome: Progressing     Problem: Discharge Planning  Goal: Discharge to home or other facility with appropriate resources  6/16/2025 1952 by Kaye Ruiz RN  Outcome: Progressing  6/16/2025 1951 by Kaye Ruiz RN  Outcome: Progressing     Problem: Chronic Conditions and Co-morbidities  Goal: Patient's chronic conditions and co-morbidity symptoms are monitored and maintained or improved  6/16/2025 1952 by Kaye Ruiz RN  Outcome: Progressing  6/16/2025 1951 by Kaye Ruiz RN  Outcome: Progressing

## 2025-06-16 NOTE — PROGRESS NOTES
06/16/25 1413   Discharge Planning   Living Arrangements Spouse/significant other   Support Systems Spouse/significant other;Children   Assistance Needed Independent   Type of Residence Private residence   Number of Stairs to Enter Residence 7   Number of Stairs Within Residence 0   Do you have animals or pets at home? No   Home or Post Acute Services None   Expected Discharge Disposition Home   Does the patient need discharge transport arranged? No   Financial Resource Strain   How hard is it for you to pay for the very basics like food, housing, medical care, and heating? Not hard   Housing Stability   In the last 12 months, was there a time when you were not able to pay the mortgage or rent on time? N   In the past 12 months, how many times have you moved where you were living? 0   At any time in the past 12 months, were you homeless or living in a shelter (including now)? N   Transportation Needs   In the past 12 months, has lack of transportation kept you from medical appointments or from getting medications? no   In the past 12 months, has lack of transportation kept you from meetings, work, or from getting things needed for daily living? No   Stroke Family Assessment   Stroke Family Assessment Needed No   Intensity of Service   Intensity of Service 0-30 min     Patient is postop for a revision of colostomy.  PLAN/BARRIER: return of bowel function, tolerate diet, pain control  DISP: home  DME: none  O2: none  WOUNDS: surgical  ADOD: 3 days  Patient has no discharge needs at this time.  Dominga Durand RN

## 2025-06-16 NOTE — ANESTHESIA PROCEDURE NOTES
Airway  Date/Time: 6/16/2025 7:38 AM  Reason: elective    Airway not difficult    Staffing  Performed: YESIKA and DEDE   Authorized by: Lino Calvo MD    Performed by: YESIKA Prince  Patient location during procedure: OR    Patient Condition  Indications for airway management: anesthesia  Patient position: sniffing  Sedation level: deep     Final Airway Details   Preoxygenated: yes  Final airway type: endotracheal airway  Successful airway: ETT  Cuffed: yes   Successful intubation technique: direct laryngoscopy  Adjuncts used in placement: cricoid pressure and intubating stylet  Endotracheal tube insertion site: oral  Blade: Jamey  Blade size: #3  ETT size (mm): 7.0  Cormack-Lehane Classification: grade IIa - partial view of glottis  Placement verified by: chest auscultation, capnometry and palpation of cuff   Measured from: lips  ETT to lips (cm): 22  Number of attempts at approach: 1

## 2025-06-16 NOTE — CARE PLAN
The patient's goals for the shift include      The clinical goals for the shift include Patient pain will be maintained throughout shift    Problem: Safety - Adult  Goal: Free from fall injury  Outcome: Progressing     Problem: Pain - Adult  Goal: Verbalizes/displays adequate comfort level or baseline comfort level  Outcome: Progressing     Problem: Discharge Planning  Goal: Discharge to home or other facility with appropriate resources  Outcome: Progressing     Problem: Chronic Conditions and Co-morbidities  Goal: Patient's chronic conditions and co-morbidity symptoms are monitored and maintained or improved  Outcome: Progressing     Problem: Nutrition  Goal: Nutrient intake appropriate for maintaining nutritional needs  Outcome: Progressing

## 2025-06-17 LAB
ANION GAP SERPL CALC-SCNC: 12 MMOL/L (ref 10–20)
BUN SERPL-MCNC: 20 MG/DL (ref 6–23)
CALCIUM SERPL-MCNC: 9.1 MG/DL (ref 8.6–10.3)
CHLORIDE SERPL-SCNC: 99 MMOL/L (ref 98–107)
CO2 SERPL-SCNC: 29 MMOL/L (ref 21–32)
CREAT SERPL-MCNC: 0.74 MG/DL (ref 0.5–1.05)
EGFRCR SERPLBLD CKD-EPI 2021: 89 ML/MIN/1.73M*2
ERYTHROCYTE [DISTWIDTH] IN BLOOD BY AUTOMATED COUNT: 16.2 % (ref 11.5–14.5)
GLUCOSE SERPL-MCNC: 107 MG/DL (ref 74–99)
HCT VFR BLD AUTO: 34.1 % (ref 36–46)
HGB BLD-MCNC: 10.5 G/DL (ref 12–16)
MAGNESIUM SERPL-MCNC: 2.32 MG/DL (ref 1.6–2.4)
MCH RBC QN AUTO: 29.2 PG (ref 26–34)
MCHC RBC AUTO-ENTMCNC: 30.8 G/DL (ref 32–36)
MCV RBC AUTO: 95 FL (ref 80–100)
NRBC BLD-RTO: 0 /100 WBCS (ref 0–0)
PLATELET # BLD AUTO: 442 X10*3/UL (ref 150–450)
POTASSIUM SERPL-SCNC: 4.4 MMOL/L (ref 3.5–5.3)
RBC # BLD AUTO: 3.59 X10*6/UL (ref 4–5.2)
SODIUM SERPL-SCNC: 136 MMOL/L (ref 136–145)
WBC # BLD AUTO: 11.1 X10*3/UL (ref 4.4–11.3)

## 2025-06-17 PROCEDURE — 2500000004 HC RX 250 GENERAL PHARMACY W/ HCPCS (ALT 636 FOR OP/ED)

## 2025-06-17 PROCEDURE — 2500000001 HC RX 250 WO HCPCS SELF ADMINISTERED DRUGS (ALT 637 FOR MEDICARE OP): Performed by: STUDENT IN AN ORGANIZED HEALTH CARE EDUCATION/TRAINING PROGRAM

## 2025-06-17 PROCEDURE — 2500000004 HC RX 250 GENERAL PHARMACY W/ HCPCS (ALT 636 FOR OP/ED): Mod: JZ

## 2025-06-17 PROCEDURE — 36415 COLL VENOUS BLD VENIPUNCTURE: CPT | Performed by: STUDENT IN AN ORGANIZED HEALTH CARE EDUCATION/TRAINING PROGRAM

## 2025-06-17 PROCEDURE — 2500000001 HC RX 250 WO HCPCS SELF ADMINISTERED DRUGS (ALT 637 FOR MEDICARE OP)

## 2025-06-17 PROCEDURE — 80048 BASIC METABOLIC PNL TOTAL CA: CPT | Performed by: STUDENT IN AN ORGANIZED HEALTH CARE EDUCATION/TRAINING PROGRAM

## 2025-06-17 PROCEDURE — 2500000004 HC RX 250 GENERAL PHARMACY W/ HCPCS (ALT 636 FOR OP/ED): Performed by: COLON & RECTAL SURGERY

## 2025-06-17 PROCEDURE — 99232 SBSQ HOSP IP/OBS MODERATE 35: CPT

## 2025-06-17 PROCEDURE — 2500000002 HC RX 250 W HCPCS SELF ADMINISTERED DRUGS (ALT 637 FOR MEDICARE OP, ALT 636 FOR OP/ED): Performed by: STUDENT IN AN ORGANIZED HEALTH CARE EDUCATION/TRAINING PROGRAM

## 2025-06-17 PROCEDURE — 1100000001 HC PRIVATE ROOM DAILY

## 2025-06-17 PROCEDURE — 2500000004 HC RX 250 GENERAL PHARMACY W/ HCPCS (ALT 636 FOR OP/ED): Performed by: STUDENT IN AN ORGANIZED HEALTH CARE EDUCATION/TRAINING PROGRAM

## 2025-06-17 PROCEDURE — 85027 COMPLETE CBC AUTOMATED: CPT | Performed by: STUDENT IN AN ORGANIZED HEALTH CARE EDUCATION/TRAINING PROGRAM

## 2025-06-17 PROCEDURE — 83735 ASSAY OF MAGNESIUM: CPT | Performed by: STUDENT IN AN ORGANIZED HEALTH CARE EDUCATION/TRAINING PROGRAM

## 2025-06-17 PROCEDURE — 2500000005 HC RX 250 GENERAL PHARMACY W/O HCPCS

## 2025-06-17 RX ORDER — GABAPENTIN 400 MG/1
1200 CAPSULE ORAL EVERY MORNING
Status: DISCONTINUED | OUTPATIENT
Start: 2025-06-17 | End: 2025-06-25 | Stop reason: HOSPADM

## 2025-06-17 RX ORDER — LIDOCAINE 560 MG/1
1 PATCH PERCUTANEOUS; TOPICAL; TRANSDERMAL DAILY
Status: DISCONTINUED | OUTPATIENT
Start: 2025-06-17 | End: 2025-06-25 | Stop reason: HOSPADM

## 2025-06-17 RX ADMIN — SODIUM CHLORIDE, SODIUM LACTATE, POTASSIUM CHLORIDE, AND CALCIUM CHLORIDE 75 ML/HR: .6; .31; .03; .02 INJECTION, SOLUTION INTRAVENOUS at 05:26

## 2025-06-17 RX ADMIN — LIDOCAINE 4% 1 PATCH: 40 PATCH TOPICAL at 10:03

## 2025-06-17 RX ADMIN — GABAPENTIN 1200 MG: 400 CAPSULE ORAL at 12:56

## 2025-06-17 RX ADMIN — HYDROMORPHONE HYDROCHLORIDE 0.2 MG: 0.2 INJECTION, SOLUTION INTRAMUSCULAR; INTRAVENOUS; SUBCUTANEOUS at 20:24

## 2025-06-17 RX ADMIN — GABAPENTIN 1800 MG: 300 CAPSULE ORAL at 20:24

## 2025-06-17 RX ADMIN — HYDROMORPHONE HYDROCHLORIDE 0.2 MG: 0.2 INJECTION, SOLUTION INTRAMUSCULAR; INTRAVENOUS; SUBCUTANEOUS at 16:12

## 2025-06-17 RX ADMIN — HYDROMORPHONE HYDROCHLORIDE 0.2 MG: 0.2 INJECTION, SOLUTION INTRAMUSCULAR; INTRAVENOUS; SUBCUTANEOUS at 03:38

## 2025-06-17 RX ADMIN — HYDROMORPHONE HYDROCHLORIDE 0.2 MG: 0.2 INJECTION, SOLUTION INTRAMUSCULAR; INTRAVENOUS; SUBCUTANEOUS at 08:34

## 2025-06-17 RX ADMIN — GABAPENTIN 300 MG: 300 CAPSULE ORAL at 08:42

## 2025-06-17 RX ADMIN — ACETAMINOPHEN 650 MG: 325 TABLET, FILM COATED ORAL at 08:41

## 2025-06-17 RX ADMIN — ACETAMINOPHEN 650 MG: 325 TABLET, FILM COATED ORAL at 17:57

## 2025-06-17 RX ADMIN — OXYCODONE HYDROCHLORIDE 10 MG: 5 TABLET ORAL at 11:50

## 2025-06-17 RX ADMIN — OXYCODONE HYDROCHLORIDE 10 MG: 5 TABLET ORAL at 05:05

## 2025-06-17 RX ADMIN — METHOCARBAMOL 1000 MG: 1000 INJECTION, SOLUTION INTRAMUSCULAR; INTRAVENOUS at 01:23

## 2025-06-17 RX ADMIN — IRON SUCROSE 100 MG: 20 INJECTION, SOLUTION INTRAVENOUS at 12:49

## 2025-06-17 RX ADMIN — HYDROMORPHONE HYDROCHLORIDE 0.2 MG: 0.2 INJECTION, SOLUTION INTRAMUSCULAR; INTRAVENOUS; SUBCUTANEOUS at 13:01

## 2025-06-17 RX ADMIN — LEVOTHYROXINE SODIUM 100 MCG: 0.1 TABLET ORAL at 05:05

## 2025-06-17 RX ADMIN — HYDROMORPHONE HYDROCHLORIDE 0.2 MG: 0.2 INJECTION, SOLUTION INTRAMUSCULAR; INTRAVENOUS; SUBCUTANEOUS at 00:08

## 2025-06-17 RX ADMIN — ENOXAPARIN SODIUM 40 MG: 40 INJECTION SUBCUTANEOUS at 08:42

## 2025-06-17 RX ADMIN — OXYCODONE HYDROCHLORIDE 10 MG: 5 TABLET ORAL at 18:08

## 2025-06-17 ASSESSMENT — PAIN - FUNCTIONAL ASSESSMENT

## 2025-06-17 ASSESSMENT — COGNITIVE AND FUNCTIONAL STATUS - GENERAL
EATING MEALS: A LITTLE
DAILY ACTIVITIY SCORE: 24
MOBILITY SCORE: 18
WALKING IN HOSPITAL ROOM: A LITTLE
MOBILITY SCORE: 24
MOBILITY SCORE: 24
MOVING FROM LYING ON BACK TO SITTING ON SIDE OF FLAT BED WITH BEDRAILS: A LITTLE
DAILY ACTIVITIY SCORE: 18
STANDING UP FROM CHAIR USING ARMS: A LITTLE
TURNING FROM BACK TO SIDE WHILE IN FLAT BAD: A LITTLE
MOVING TO AND FROM BED TO CHAIR: A LITTLE
TOILETING: A LITTLE
HELP NEEDED FOR BATHING: A LITTLE
MOBILITY SCORE: 24
DRESSING REGULAR LOWER BODY CLOTHING: A LITTLE
DAILY ACTIVITIY SCORE: 24
DRESSING REGULAR UPPER BODY CLOTHING: A LITTLE
CLIMB 3 TO 5 STEPS WITH RAILING: A LITTLE
PERSONAL GROOMING: A LITTLE
DAILY ACTIVITIY SCORE: 24

## 2025-06-17 ASSESSMENT — PAIN SCALES - GENERAL
PAINLEVEL_OUTOF10: 9
PAINLEVEL_OUTOF10: 9
PAINLEVEL_OUTOF10: 8
PAINLEVEL_OUTOF10: 9
PAINLEVEL_OUTOF10: 3
PAINLEVEL_OUTOF10: 3
PAINLEVEL_OUTOF10: 6
PAINLEVEL_OUTOF10: 0 - NO PAIN
PAINLEVEL_OUTOF10: 3
PAINLEVEL_OUTOF10: 10 - WORST POSSIBLE PAIN
PAINLEVEL_OUTOF10: 3
PAINLEVEL_OUTOF10: 7
PAINLEVEL_OUTOF10: 8
PAINLEVEL_OUTOF10: 10 - WORST POSSIBLE PAIN
PAINLEVEL_OUTOF10: 5 - MODERATE PAIN
PAINLEVEL_OUTOF10: 8
PAINLEVEL_OUTOF10: 8

## 2025-06-17 ASSESSMENT — PAIN DESCRIPTION - LOCATION
LOCATION: ABDOMEN

## 2025-06-17 ASSESSMENT — PAIN DESCRIPTION - ORIENTATION: ORIENTATION: MID;UPPER

## 2025-06-17 NOTE — PROGRESS NOTES
"Barbara Sow is a 65 y.o. female on day 1 of admission presenting with Large bowel obstruction (Multi).    Subjective   Post-operative abdominal pains this morning with subtle improvement into the afternoon. Tolerating diet. No flatus or bm. Voiding independently. No further complaints.    Objective   PE:  Constitutional: A&Ox3, calm and cooperative, NAD  Cardiovascular: Normal rate and regular rhythm.  Respiratory/Thorax: Good symmetric chest expansion. No labored breathing.  Gastrointestinal: Abdomen moderately distended, soft, appropriately tender, no peritoneal signs,stoma site is cdi  Genitourinary: Voiding independently   Extremities: No peripheral edema  Neurological: A&Ox3, No focal deficits  Psychological: Appropriate mood and behavior  Skin: Warm and dry    Last Recorded Vitals  Blood pressure 126/78, pulse 100, temperature 37.1 °C (98.8 °F), temperature source Temporal, resp. rate 17, height 1.702 m (5' 7\"), weight 62 kg (136 lb 11 oz), SpO2 92%.  Intake/Output last 3 Shifts:  I/O last 3 completed shifts:  In: 1140 (18.4 mL/kg) [P.O.:400; I.V.:540 (8.7 mL/kg); IV Piggyback:200]  Out: 950 (15.3 mL/kg) [Urine:925 (0.4 mL/kg/hr); Blood:25]  Weight: 62 kg     Relevant Results  Scheduled medications  Scheduled Medications[1]  Continuous medications  Continuous Medications[2]  PRN medications  PRN Medications[3]    Results for orders placed or performed during the hospital encounter of 06/16/25 (from the past 24 hours)   CBC   Result Value Ref Range    WBC 11.1 4.4 - 11.3 x10*3/uL    nRBC 0.0 0.0 - 0.0 /100 WBCs    RBC 3.59 (L) 4.00 - 5.20 x10*6/uL    Hemoglobin 10.5 (L) 12.0 - 16.0 g/dL    Hematocrit 34.1 (L) 36.0 - 46.0 %    MCV 95 80 - 100 fL    MCH 29.2 26.0 - 34.0 pg    MCHC 30.8 (L) 32.0 - 36.0 g/dL    RDW 16.2 (H) 11.5 - 14.5 %    Platelets 442 150 - 450 x10*3/uL   Basic metabolic panel   Result Value Ref Range    Glucose 107 (H) 74 - 99 mg/dL    Sodium 136 136 - 145 mmol/L    Potassium 4.4 3.5 - " 5.3 mmol/L    Chloride 99 98 - 107 mmol/L    Bicarbonate 29 21 - 32 mmol/L    Anion Gap 12 10 - 20 mmol/L    Urea Nitrogen 20 6 - 23 mg/dL    Creatinine 0.74 0.50 - 1.05 mg/dL    eGFR 89 >60 mL/min/1.73m*2    Calcium 9.1 8.6 - 10.3 mg/dL   Magnesium   Result Value Ref Range    Magnesium 2.32 1.60 - 2.40 mg/dL     Assessment & Plan    POD1 laparoscopic splenic flexure takedown/resection, colostomy closure and SAMI block (Intraoperative Findings: Minimal adhesions, tattoo proximal and distal to flexure noted. Colon resection with great bleeding at the colon edge)     Neuro: post-surgical pain, currently well managed  - Hx: anxiety, B/l sciatica, R cervical and lumbar radiculopathy, OA, DDD  - OOB/ ambulate 5x per day  - Scheduled acetaminophen  - PRN oxy, dilaudid for severe pain only  - Added lido patch  - Changed gabapentin to her home regimen    CV: RRR, BP stable  - VS every 4 hours     Pulm: Patent airways, equal and symmetrical chest expansion, on RA  - IS every hour while awake   - Pulse ox every 4 hours with VS     GI: Abdomen moderately distended, soft, appropriately tender, no peritoneal signs,stoma site is cdi  - Hx: LBO  - Low fiber diet  - OOB/ambulation  - PRN antiemetic    - Scop patch  - Ensures  - Chewing gum    : voiding without difficulty  - Cr 0.74  - Lytes wnl  - Monitor I&Os     HEME: hgb 10.5  - DVT Proph   - SCDs/ ambulate/lovenox     ID: Afebrile  - WBC 11.1  - Monitor for s/s infection    Dispo: Continue post-operative care on nursing floor. Barriers to discharge included pain control and return of bowel function.    Discussed with Dr Valencia.    I spent 35 minutes in the professional and overall care of this patient.    Oswald Bae PA-C         [1] acetaminophen, 650 mg, oral, q6h  enoxaparin, 40 mg, subcutaneous, q24h  gabapentin, 1,200 mg, oral, q AM  gabapentin, 1,800 mg, oral, Nightly  iron sucrose, 100 mg, intravenous, Daily  levothyroxine, 100 mcg, oral, Daily  lidocaine, 1  patch, transdermal, Daily  scopolamine, 1 patch, transdermal, q72h  [2]    [3] PRN medications: HYDROmorphone, methocarbamol, naloxone, ondansetron **OR** ondansetron, oxyCODONE, oxyCODONE

## 2025-06-17 NOTE — DISCHARGE INSTRUCTIONS
Instructions After Surgery    Wound Care:  - Ice packs to wounds as needed  - No baths or swimming until follow up appointment  - Do not apply topical creams or ointments  - Call if you notice redness around wound, foul-smelling drainage, or increasing pain     Diet:  - Low fiber diet    Activity  - Take it easy for the first 48 hours  - Stairs and walks are fine  - Resume activities gradually over the first week  - Ask Dr. Valencia before resuming strenuous physical exertions  - No driving while on pain medication.   - You may resume driving when you no longer have discomfort getting in or out of the vehicle and you can perform braking without difficulty or pain.    Medications  - Pain medicine prescription attached for severe pain, if needed  - Resume your home medications unless otherwise directed    Other Instruction:  - Call the doctor for the following:  Severe unrelieved pain  Fevers > 101F  Nausea/vomiting  Wound issues  Insurance/return to work forms  Shortness of breath  chest pains  -Feedback on your surgical experience is appreciated!.

## 2025-06-17 NOTE — POST-PROCEDURE NOTE
Ms. Sow is a 65 year old female who is POD #0 from a laparoscopic splenic flexure takedown/resection, colostomy closure and SAMI block (Intraoperative Findings: Minimal adhesions, tattoo proximal and distal to flexure noted. Colon resection with great bleeding at the colon edge). She reports abdominal pain post-operatively - she does not feel like medications are helping. Endorses mild nausea without vomiting. She has urinated since OR. Denies passing gas or having a BM since OR.     PE:  Constitutional: A&Ox3, appears uncomfortable.  Eyes: PERRL.  ENMT: Moist mucous membranes.  Head/Neck: Neck supple.  Cardiovascular: Normal rate.  Respiratory/Thorax: Unlabored breathing.  Gastrointestinal: Abdomen moderately distended, soft, appropriately tender to palpation, no peritoneal signs, surgical dressing in place with a mild amount of shadowing. Abdominal binder in place.   Musculoskeletal: ROM intact.  Neurological: A&Ox3, No focal deficits.  Psychological: Appropriate mood and behavior.  Skin: Warm and dry.    Radiology and labs reviewed. VSS, afebrile.    Plan:   - Diet: Fiber Restricted  - IVF  - Continue current pain regimen   - PRN antiemetic   - DVT Proph: SCDs/ ambulate/ Lovenox  - Monitor VS every 4 hours   - Labs ordered for AM   - IS every hour while awake   - Encourage ambulation / OOB as tolerated   - Daily weights  - Monitor surgical sites for drainage, erythema, excessive bruising   - F/u with Dr. Valencia outpatient once d/c from hospital     Dispo: Pain and nausea control as needed. OOB as able. Awaiting return of bowel function.    Total time spent 25 minutes, and greater than 50% of time was spent in counseling/coordination of care.

## 2025-06-17 NOTE — CARE PLAN
The patient's goals for the shift include      The clinical goals for the shift include Patient pain will be maintained throughout shift

## 2025-06-18 LAB
ANION GAP SERPL CALC-SCNC: 17 MMOL/L (ref 10–20)
ATRIAL RATE: 81 BPM
BUN SERPL-MCNC: 17 MG/DL (ref 6–23)
CALCIUM SERPL-MCNC: 9.3 MG/DL (ref 8.6–10.3)
CHLORIDE SERPL-SCNC: 95 MMOL/L (ref 98–107)
CO2 SERPL-SCNC: 24 MMOL/L (ref 21–32)
CREAT SERPL-MCNC: 0.8 MG/DL (ref 0.5–1.05)
EGFRCR SERPLBLD CKD-EPI 2021: 81 ML/MIN/1.73M*2
ERYTHROCYTE [DISTWIDTH] IN BLOOD BY AUTOMATED COUNT: 15.9 % (ref 11.5–14.5)
GLUCOSE SERPL-MCNC: 108 MG/DL (ref 74–99)
HCT VFR BLD AUTO: 29.5 % (ref 36–46)
HGB BLD-MCNC: 9.4 G/DL (ref 12–16)
MCH RBC QN AUTO: 29.2 PG (ref 26–34)
MCHC RBC AUTO-ENTMCNC: 31.9 G/DL (ref 32–36)
MCV RBC AUTO: 92 FL (ref 80–100)
NRBC BLD-RTO: 0 /100 WBCS (ref 0–0)
P AXIS: 66 DEGREES
P OFFSET: 193 MS
P ONSET: 143 MS
PLATELET # BLD AUTO: 405 X10*3/UL (ref 150–450)
POTASSIUM SERPL-SCNC: 4.4 MMOL/L (ref 3.5–5.3)
PR INTERVAL: 156 MS
Q ONSET: 221 MS
QRS COUNT: 13 BEATS
QRS DURATION: 90 MS
QT INTERVAL: 428 MS
QTC CALCULATION(BAZETT): 497 MS
QTC FREDERICIA: 472 MS
R AXIS: 7 DEGREES
RBC # BLD AUTO: 3.22 X10*6/UL (ref 4–5.2)
SODIUM SERPL-SCNC: 132 MMOL/L (ref 136–145)
T AXIS: 128 DEGREES
T OFFSET: 435 MS
VENTRICULAR RATE: 81 BPM
WBC # BLD AUTO: 10.9 X10*3/UL (ref 4.4–11.3)

## 2025-06-18 PROCEDURE — 85027 COMPLETE CBC AUTOMATED: CPT

## 2025-06-18 PROCEDURE — 2500000005 HC RX 250 GENERAL PHARMACY W/O HCPCS

## 2025-06-18 PROCEDURE — 80048 BASIC METABOLIC PNL TOTAL CA: CPT

## 2025-06-18 PROCEDURE — 2500000002 HC RX 250 W HCPCS SELF ADMINISTERED DRUGS (ALT 637 FOR MEDICARE OP, ALT 636 FOR OP/ED): Performed by: STUDENT IN AN ORGANIZED HEALTH CARE EDUCATION/TRAINING PROGRAM

## 2025-06-18 PROCEDURE — 1100000001 HC PRIVATE ROOM DAILY

## 2025-06-18 PROCEDURE — 2500000004 HC RX 250 GENERAL PHARMACY W/ HCPCS (ALT 636 FOR OP/ED): Mod: JZ

## 2025-06-18 PROCEDURE — 2500000004 HC RX 250 GENERAL PHARMACY W/ HCPCS (ALT 636 FOR OP/ED): Performed by: COLON & RECTAL SURGERY

## 2025-06-18 PROCEDURE — 99233 SBSQ HOSP IP/OBS HIGH 50: CPT

## 2025-06-18 PROCEDURE — 2500000001 HC RX 250 WO HCPCS SELF ADMINISTERED DRUGS (ALT 637 FOR MEDICARE OP)

## 2025-06-18 PROCEDURE — 2500000004 HC RX 250 GENERAL PHARMACY W/ HCPCS (ALT 636 FOR OP/ED): Performed by: STUDENT IN AN ORGANIZED HEALTH CARE EDUCATION/TRAINING PROGRAM

## 2025-06-18 PROCEDURE — 36415 COLL VENOUS BLD VENIPUNCTURE: CPT

## 2025-06-18 PROCEDURE — 2500000001 HC RX 250 WO HCPCS SELF ADMINISTERED DRUGS (ALT 637 FOR MEDICARE OP): Performed by: STUDENT IN AN ORGANIZED HEALTH CARE EDUCATION/TRAINING PROGRAM

## 2025-06-18 RX ORDER — OXYCODONE HYDROCHLORIDE 5 MG/1
5 TABLET ORAL EVERY 6 HOURS PRN
Refills: 0 | Status: DISCONTINUED | OUTPATIENT
Start: 2025-06-18 | End: 2025-06-25 | Stop reason: HOSPADM

## 2025-06-18 RX ADMIN — HYDROMORPHONE HYDROCHLORIDE 0.2 MG: 0.2 INJECTION, SOLUTION INTRAMUSCULAR; INTRAVENOUS; SUBCUTANEOUS at 11:43

## 2025-06-18 RX ADMIN — LEVOTHYROXINE SODIUM 100 MCG: 0.1 TABLET ORAL at 06:15

## 2025-06-18 RX ADMIN — ENOXAPARIN SODIUM 40 MG: 40 INJECTION SUBCUTANEOUS at 09:49

## 2025-06-18 RX ADMIN — ACETAMINOPHEN 650 MG: 325 TABLET, FILM COATED ORAL at 17:28

## 2025-06-18 RX ADMIN — OXYCODONE HYDROCHLORIDE 10 MG: 5 TABLET ORAL at 09:52

## 2025-06-18 RX ADMIN — ACETAMINOPHEN 650 MG: 325 TABLET, FILM COATED ORAL at 12:19

## 2025-06-18 RX ADMIN — GABAPENTIN 1800 MG: 300 CAPSULE ORAL at 20:11

## 2025-06-18 RX ADMIN — OXYCODONE HYDROCHLORIDE 5 MG: 5 TABLET ORAL at 17:30

## 2025-06-18 RX ADMIN — OXYCODONE HYDROCHLORIDE 10 MG: 5 TABLET ORAL at 01:01

## 2025-06-18 RX ADMIN — LIDOCAINE 4% 1 PATCH: 40 PATCH TOPICAL at 09:50

## 2025-06-18 RX ADMIN — IRON SUCROSE 100 MG: 20 INJECTION, SOLUTION INTRAVENOUS at 08:39

## 2025-06-18 RX ADMIN — SODIUM CHLORIDE 1000 ML: 0.9 INJECTION, SOLUTION INTRAVENOUS at 17:28

## 2025-06-18 RX ADMIN — GABAPENTIN 1200 MG: 400 CAPSULE ORAL at 09:49

## 2025-06-18 RX ADMIN — HYDROMORPHONE HYDROCHLORIDE 0.2 MG: 0.2 INJECTION, SOLUTION INTRAMUSCULAR; INTRAVENOUS; SUBCUTANEOUS at 15:48

## 2025-06-18 ASSESSMENT — COGNITIVE AND FUNCTIONAL STATUS - GENERAL
MOVING TO AND FROM BED TO CHAIR: A LITTLE
DAILY ACTIVITIY SCORE: 24
HELP NEEDED FOR BATHING: A LITTLE
MOBILITY SCORE: 24
DAILY ACTIVITIY SCORE: 24
STANDING UP FROM CHAIR USING ARMS: A LITTLE
DAILY ACTIVITIY SCORE: 24
TOILETING: A LITTLE
DRESSING REGULAR UPPER BODY CLOTHING: A LITTLE
WALKING IN HOSPITAL ROOM: A LITTLE
PERSONAL GROOMING: A LITTLE
MOVING FROM LYING ON BACK TO SITTING ON SIDE OF FLAT BED WITH BEDRAILS: A LITTLE
MOBILITY SCORE: 24
DAILY ACTIVITIY SCORE: 18
MOBILITY SCORE: 24
DRESSING REGULAR LOWER BODY CLOTHING: A LITTLE
TURNING FROM BACK TO SIDE WHILE IN FLAT BAD: A LITTLE
MOBILITY SCORE: 24
DAILY ACTIVITIY SCORE: 24
MOBILITY SCORE: 24
CLIMB 3 TO 5 STEPS WITH RAILING: A LITTLE
DAILY ACTIVITIY SCORE: 24
DAILY ACTIVITIY SCORE: 24
MOBILITY SCORE: 24
EATING MEALS: A LITTLE
MOBILITY SCORE: 18

## 2025-06-18 ASSESSMENT — PAIN SCALES - GENERAL
PAINLEVEL_OUTOF10: 7
PAINLEVEL_OUTOF10: 7
PAINLEVEL_OUTOF10: 3
PAINLEVEL_OUTOF10: 4
PAINLEVEL_OUTOF10: 5 - MODERATE PAIN
PAINLEVEL_OUTOF10: 8
PAINLEVEL_OUTOF10: 7
PAINLEVEL_OUTOF10: 7

## 2025-06-18 ASSESSMENT — PAIN DESCRIPTION - LOCATION
LOCATION: ABDOMEN

## 2025-06-18 ASSESSMENT — PAIN - FUNCTIONAL ASSESSMENT
PAIN_FUNCTIONAL_ASSESSMENT: 0-10

## 2025-06-18 ASSESSMENT — ACTIVITIES OF DAILY LIVING (ADL): LACK_OF_TRANSPORTATION: NO

## 2025-06-18 NOTE — PROGRESS NOTES
"Barbara Sow is a 65 y.o. female on day 2 of admission presenting with Large bowel obstruction (Multi).    Subjective   Patient is awake in the chair this morning. She reports she has been feeling bloated. Denies nausea or vomiting.-gas -BM       Objective     Physical Exam  Constitutional:       Appearance: Normal appearance.   Cardiovascular:      Rate and Rhythm: Normal rate and regular rhythm.   Pulmonary:      Effort: Pulmonary effort is normal.      Comments: Non labored breathing on RA  Abdominal:      General: There is distension (firm).      Tenderness: There is abdominal tenderness.      Comments: Lap sites C/D/I, edges well approximated, covered in Dermabond. ABD binder on    Skin:     General: Skin is warm and dry.   Neurological:      General: No focal deficit present.      Mental Status: She is alert and oriented to person, place, and time. Mental status is at baseline.   Psychiatric:         Attention and Perception: Attention normal.         Mood and Affect: Mood normal.         Speech: Speech normal.         Behavior: Behavior normal.         Cognition and Memory: Cognition normal.         Last Recorded Vitals  Blood pressure 123/65, pulse 84, temperature 36.3 °C (97.3 °F), temperature source Oral, resp. rate 17, height 1.702 m (5' 7\"), weight 62 kg (136 lb 11 oz), SpO2 92%.  Intake/Output last 3 Shifts:  I/O last 3 completed shifts:  In: 640 (10.3 mL/kg) [P.O.:640]  Out: 575 (9.3 mL/kg) [Urine:575 (0.3 mL/kg/hr)]  Weight: 62 kg     Relevant Results  Scheduled medications  Scheduled Medications[1]  Continuous medications  Continuous Medications[2]  PRN medications  PRN Medications[3]   Results for orders placed or performed during the hospital encounter of 06/16/25 (from the past 24 hours)   CBC   Result Value Ref Range    WBC 10.9 4.4 - 11.3 x10*3/uL    nRBC 0.0 0.0 - 0.0 /100 WBCs    RBC 3.22 (L) 4.00 - 5.20 x10*6/uL    Hemoglobin 9.4 (L) 12.0 - 16.0 g/dL    Hematocrit 29.5 (L) 36.0 - 46.0 " %    MCV 92 80 - 100 fL    MCH 29.2 26.0 - 34.0 pg    MCHC 31.9 (L) 32.0 - 36.0 g/dL    RDW 15.9 (H) 11.5 - 14.5 %    Platelets 405 150 - 450 x10*3/uL   Basic Metabolic Panel   Result Value Ref Range    Glucose 108 (H) 74 - 99 mg/dL    Sodium 132 (L) 136 - 145 mmol/L    Potassium 4.4 3.5 - 5.3 mmol/L    Chloride 95 (L) 98 - 107 mmol/L    Bicarbonate 24 21 - 32 mmol/L    Anion Gap 17 10 - 20 mmol/L    Urea Nitrogen 17 6 - 23 mg/dL    Creatinine 0.80 0.50 - 1.05 mg/dL    eGFR 81 >60 mL/min/1.73m*2    Calcium 9.3 8.6 - 10.3 mg/dL      No orders to display                           Assessment & Plan  Large bowel obstruction (Multi)    Neuro: Acute post-op pain well controlled     Hx: anxiety, B/l sciatica, R cervical and lumbar radiculopathy, OA, DDD   - OOB ambulate as tolerated at least 5 times per day  - Continue current pain control regimen    Cardiovascular: /65 (BP Location: Right arm, Patient Position: Sitting)   Pulse 84    - VS Q4h    Pulmonary: Non labored breathing on RA  - Encourage IS  - Encourage ambulation    Gastrointestinal: POD 2 Lx splenic flexure takedown ; Laparoscopic splenic flexure resection ; Colostomy closure ;   SAMI block Findings: Minimal adhesions, tattoo proximal and distal to flexure noted.  Colon resection with great bleeding at the colon edge and      Hx: LBO  - Low fiber diet back off to ice chips today   - PRN antiemetic  - continue current bowel regimen     Genitourinary: Voiding independently  - continue mIVF  - Monitor I&Os   - Cr: 0.80  - Continue to trend electrolytes with daily labs    Hematology:   - H/H: 10.5/34.1 -> 9.4/29.5  - DVT ppx: SCDs/ Lovenox  - No active s/s of bleeding    Infectious Disease:   - WBC: 11.1 -> 10.9  - Afebrile, VSS  - Continue to monitor for s/s of infection  - Continue to monitor WBC with daily CBC     Discussed with Dr. Valencia., going slow with diet, and watching bloating. Limit narcotics      I spent 50 minutes in the professional and  overall care of this patient.      Toby Brantley PA-C         [1] acetaminophen, 650 mg, oral, q6h  enoxaparin, 40 mg, subcutaneous, q24h  gabapentin, 1,200 mg, oral, q AM  gabapentin, 1,800 mg, oral, Nightly  iron sucrose, 100 mg, intravenous, Daily  levothyroxine, 100 mcg, oral, Daily  lidocaine, 1 patch, transdermal, Daily  scopolamine, 1 patch, transdermal, q72h  [2]    [3] PRN medications: HYDROmorphone, naloxone, ondansetron **OR** ondansetron, oxyCODONE, oxyCODONE

## 2025-06-18 NOTE — PROGRESS NOTES
06/18/25 0948   Discharge Planning   Living Arrangements Family members;Children   Support Systems Family members;Spouse/significant other;Children   Assistance Needed Independent, her daughter and grand-daughter have been staying with her   Type of Residence Private residence   Number of Stairs to Enter Residence 7   Number of Stairs Within Residence 0   Do you have animals or pets at home? No   Home or Post Acute Services None   Expected Discharge Disposition Home   Does the patient need discharge transport arranged? No  (daughter will transport her home)   Financial Resource Strain   How hard is it for you to pay for the very basics like food, housing, medical care, and heating? Not hard   Housing Stability   In the last 12 months, was there a time when you were not able to pay the mortgage or rent on time? N   In the past 12 months, how many times have you moved where you were living? 0   At any time in the past 12 months, were you homeless or living in a shelter (including now)? N   Transportation Needs   In the past 12 months, has lack of transportation kept you from medical appointments or from getting medications? no   In the past 12 months, has lack of transportation kept you from meetings, work, or from getting things needed for daily living? No   Stroke Family Assessment   Stroke Family Assessment Needed No   Intensity of Service   Intensity of Service 0-30 min     Met with patient at the bedside to discuss discharge plan.  PLAN/BARRIER: return of bowel function, tolerate diet  DISP: home  DME: none  O2: none  HHC: none  ADOD: 1-3 days  Patient would like to go home today. She has been up ad vicki and tolerating diet.  Dominga Durand RN

## 2025-06-18 NOTE — PROGRESS NOTES
06/18/25 0948   [facility] Disability Status   Are you deaf or do you have serious difficulty hearing? N   Are you blind or do you have serious difficulty seeing, even when wearing glasses? N   Because of a physical, mental, or emotional condition, do you have serious difficulty concentrating, remembering, or making decisions? (5 years old or older) N   Do you have serious difficulty walking or climbing stairs? N   Do you have serious difficulty dressing or bathing? N   Because of a physical, mental, or emotional condition, do you have serious difficulty doing errands alone such as visiting the doctor? N

## 2025-06-19 ENCOUNTER — APPOINTMENT (OUTPATIENT)
Dept: RADIOLOGY | Facility: HOSPITAL | Age: 65
DRG: 330 | End: 2025-06-19
Payer: MEDICARE

## 2025-06-19 LAB
ANION GAP SERPL CALC-SCNC: 16 MMOL/L (ref 10–20)
BUN SERPL-MCNC: 17 MG/DL (ref 6–23)
CALCIUM SERPL-MCNC: 8.6 MG/DL (ref 8.6–10.3)
CHLORIDE SERPL-SCNC: 102 MMOL/L (ref 98–107)
CO2 SERPL-SCNC: 24 MMOL/L (ref 21–32)
CREAT SERPL-MCNC: 0.67 MG/DL (ref 0.5–1.05)
EGFRCR SERPLBLD CKD-EPI 2021: >90 ML/MIN/1.73M*2
ERYTHROCYTE [DISTWIDTH] IN BLOOD BY AUTOMATED COUNT: 15.9 % (ref 11.5–14.5)
GLUCOSE SERPL-MCNC: 99 MG/DL (ref 74–99)
HCT VFR BLD AUTO: 28.2 % (ref 36–46)
HGB BLD-MCNC: 9.1 G/DL (ref 12–16)
MCH RBC QN AUTO: 29.8 PG (ref 26–34)
MCHC RBC AUTO-ENTMCNC: 32.3 G/DL (ref 32–36)
MCV RBC AUTO: 93 FL (ref 80–100)
NRBC BLD-RTO: 0 /100 WBCS (ref 0–0)
PLATELET # BLD AUTO: 260 X10*3/UL (ref 150–450)
POTASSIUM SERPL-SCNC: 4.5 MMOL/L (ref 3.5–5.3)
RBC # BLD AUTO: 3.05 X10*6/UL (ref 4–5.2)
SODIUM SERPL-SCNC: 137 MMOL/L (ref 136–145)
WBC # BLD AUTO: 8.1 X10*3/UL (ref 4.4–11.3)

## 2025-06-19 PROCEDURE — 2500000001 HC RX 250 WO HCPCS SELF ADMINISTERED DRUGS (ALT 637 FOR MEDICARE OP): Performed by: COLON & RECTAL SURGERY

## 2025-06-19 PROCEDURE — 2500000001 HC RX 250 WO HCPCS SELF ADMINISTERED DRUGS (ALT 637 FOR MEDICARE OP)

## 2025-06-19 PROCEDURE — 74019 RADEX ABDOMEN 2 VIEWS: CPT

## 2025-06-19 PROCEDURE — 36415 COLL VENOUS BLD VENIPUNCTURE: CPT

## 2025-06-19 PROCEDURE — 80048 BASIC METABOLIC PNL TOTAL CA: CPT

## 2025-06-19 PROCEDURE — 74021 RADEX ABDOMEN 3+ VIEWS: CPT | Performed by: RADIOLOGY

## 2025-06-19 PROCEDURE — 2500000004 HC RX 250 GENERAL PHARMACY W/ HCPCS (ALT 636 FOR OP/ED): Mod: JZ

## 2025-06-19 PROCEDURE — 1100000001 HC PRIVATE ROOM DAILY

## 2025-06-19 PROCEDURE — 85027 COMPLETE CBC AUTOMATED: CPT

## 2025-06-19 PROCEDURE — 2500000005 HC RX 250 GENERAL PHARMACY W/O HCPCS

## 2025-06-19 PROCEDURE — 99233 SBSQ HOSP IP/OBS HIGH 50: CPT

## 2025-06-19 PROCEDURE — 2500000002 HC RX 250 W HCPCS SELF ADMINISTERED DRUGS (ALT 637 FOR MEDICARE OP, ALT 636 FOR OP/ED): Performed by: STUDENT IN AN ORGANIZED HEALTH CARE EDUCATION/TRAINING PROGRAM

## 2025-06-19 PROCEDURE — 2500000004 HC RX 250 GENERAL PHARMACY W/ HCPCS (ALT 636 FOR OP/ED): Performed by: COLON & RECTAL SURGERY

## 2025-06-19 PROCEDURE — 2500000001 HC RX 250 WO HCPCS SELF ADMINISTERED DRUGS (ALT 637 FOR MEDICARE OP): Performed by: STUDENT IN AN ORGANIZED HEALTH CARE EDUCATION/TRAINING PROGRAM

## 2025-06-19 PROCEDURE — 2500000004 HC RX 250 GENERAL PHARMACY W/ HCPCS (ALT 636 FOR OP/ED): Performed by: STUDENT IN AN ORGANIZED HEALTH CARE EDUCATION/TRAINING PROGRAM

## 2025-06-19 RX ORDER — HYDROMORPHONE HYDROCHLORIDE 0.2 MG/ML
0.2 INJECTION INTRAMUSCULAR; INTRAVENOUS; SUBCUTANEOUS EVERY 4 HOURS PRN
Status: DISCONTINUED | OUTPATIENT
Start: 2025-06-19 | End: 2025-06-23

## 2025-06-19 RX ORDER — ADHESIVE BANDAGE
30 BANDAGE TOPICAL DAILY PRN
Status: DISCONTINUED | OUTPATIENT
Start: 2025-06-19 | End: 2025-06-25 | Stop reason: HOSPADM

## 2025-06-19 RX ORDER — ADHESIVE BANDAGE
30 BANDAGE TOPICAL DAILY PRN
Status: DISCONTINUED | OUTPATIENT
Start: 2025-06-19 | End: 2025-06-19 | Stop reason: SDUPTHER

## 2025-06-19 RX ADMIN — GABAPENTIN 1200 MG: 400 CAPSULE ORAL at 09:12

## 2025-06-19 RX ADMIN — ACETAMINOPHEN 650 MG: 325 TABLET, FILM COATED ORAL at 12:37

## 2025-06-19 RX ADMIN — LIDOCAINE 4% 1 PATCH: 40 PATCH TOPICAL at 09:13

## 2025-06-19 RX ADMIN — HYDROMORPHONE HYDROCHLORIDE 0.2 MG: 0.2 INJECTION, SOLUTION INTRAMUSCULAR; INTRAVENOUS; SUBCUTANEOUS at 18:12

## 2025-06-19 RX ADMIN — IRON SUCROSE 100 MG: 20 INJECTION, SOLUTION INTRAVENOUS at 09:55

## 2025-06-19 RX ADMIN — OXYCODONE HYDROCHLORIDE 5 MG: 5 TABLET ORAL at 15:22

## 2025-06-19 RX ADMIN — ENOXAPARIN SODIUM 40 MG: 40 INJECTION SUBCUTANEOUS at 09:13

## 2025-06-19 RX ADMIN — OXYCODONE HYDROCHLORIDE 5 MG: 5 TABLET ORAL at 00:13

## 2025-06-19 RX ADMIN — MAGNESIUM HYDROXIDE 30 ML: 1200 LIQUID ORAL at 12:37

## 2025-06-19 RX ADMIN — GABAPENTIN 1800 MG: 300 CAPSULE ORAL at 21:51

## 2025-06-19 RX ADMIN — LEVOTHYROXINE SODIUM 100 MCG: 0.1 TABLET ORAL at 04:35

## 2025-06-19 RX ADMIN — ACETAMINOPHEN 650 MG: 325 TABLET, FILM COATED ORAL at 04:35

## 2025-06-19 RX ADMIN — ACETAMINOPHEN 650 MG: 325 TABLET, FILM COATED ORAL at 17:58

## 2025-06-19 RX ADMIN — HYDROMORPHONE HYDROCHLORIDE 0.2 MG: 0.2 INJECTION, SOLUTION INTRAMUSCULAR; INTRAVENOUS; SUBCUTANEOUS at 04:35

## 2025-06-19 RX ADMIN — OXYCODONE HYDROCHLORIDE 5 MG: 5 TABLET ORAL at 09:13

## 2025-06-19 RX ADMIN — SCOPOLAMINE 1 PATCH: 1.5 PATCH, EXTENDED RELEASE TRANSDERMAL at 12:37

## 2025-06-19 ASSESSMENT — COGNITIVE AND FUNCTIONAL STATUS - GENERAL
DAILY ACTIVITIY SCORE: 24
DAILY ACTIVITIY SCORE: 24
MOBILITY SCORE: 24
DAILY ACTIVITIY SCORE: 24
MOBILITY SCORE: 24
DAILY ACTIVITIY SCORE: 24
MOBILITY SCORE: 24
DAILY ACTIVITIY SCORE: 24
MOBILITY SCORE: 24
DAILY ACTIVITIY SCORE: 24
MOBILITY SCORE: 24
DAILY ACTIVITIY SCORE: 24

## 2025-06-19 ASSESSMENT — PAIN SCALES - GENERAL
PAINLEVEL_OUTOF10: 8
PAINLEVEL_OUTOF10: 0 - NO PAIN
PAINLEVEL_OUTOF10: 0 - NO PAIN
PAINLEVEL_OUTOF10: 6
PAINLEVEL_OUTOF10: 7
PAINLEVEL_OUTOF10: 9
PAINLEVEL_OUTOF10: 0 - NO PAIN
PAINLEVEL_OUTOF10: 8
PAINLEVEL_OUTOF10: 3
PAINLEVEL_OUTOF10: 8
PAINLEVEL_OUTOF10: 2
PAINLEVEL_OUTOF10: 3

## 2025-06-19 ASSESSMENT — PAIN - FUNCTIONAL ASSESSMENT
PAIN_FUNCTIONAL_ASSESSMENT: 0-10

## 2025-06-19 ASSESSMENT — PAIN DESCRIPTION - LOCATION
LOCATION: ABDOMEN

## 2025-06-19 NOTE — PROGRESS NOTES
"Barbara Swo is a 65 y.o. female on day 3 of admission presenting with Large bowel obstruction (Multi).    Subjective   Patient is awake in bed this morning. She reports feeling bloated but feels it moving around. She has been drinking liquids. Denies any nausea or vomiting. -gas -BM       Objective     Physical Exam  Constitutional:       Appearance: Normal appearance.   Cardiovascular:      Rate and Rhythm: Normal rate and regular rhythm.   Pulmonary:      Effort: Pulmonary effort is normal.      Comments: Non labored breathing on RA  Abdominal:      General: There is distension (firm).      Tenderness: There is abdominal tenderness.      Comments: Lap sites C/D/I, edges well approximated, covered in Dermabond. ABD binder on    Skin:     General: Skin is warm and dry.   Neurological:      General: No focal deficit present.      Mental Status: She is alert and oriented to person, place, and time. Mental status is at baseline.   Psychiatric:         Attention and Perception: Attention normal.         Mood and Affect: Mood normal.         Speech: Speech normal.         Behavior: Behavior normal.         Cognition and Memory: Cognition normal.         Last Recorded Vitals  Blood pressure 127/80, pulse 84, temperature 36.9 °C (98.4 °F), resp. rate 19, height 1.702 m (5' 7\"), weight 62 kg (136 lb 11 oz), SpO2 93%.  Intake/Output last 3 Shifts:  I/O last 3 completed shifts:  In: 906.7 (14.6 mL/kg) [P.O.:240; IV Piggyback:666.7]  Out: 450 (7.3 mL/kg) [Urine:450 (0.2 mL/kg/hr)]  Weight: 62 kg     Relevant Results  Scheduled medications  Scheduled Medications[1]  Continuous medications  Continuous Medications[2]  PRN medications  PRN Medications[3]   Results for orders placed or performed during the hospital encounter of 06/16/25 (from the past 24 hours)   CBC   Result Value Ref Range    WBC 8.1 4.4 - 11.3 x10*3/uL    nRBC 0.0 0.0 - 0.0 /100 WBCs    RBC 3.05 (L) 4.00 - 5.20 x10*6/uL    Hemoglobin 9.1 (L) 12.0 - 16.0 " g/dL    Hematocrit 28.2 (L) 36.0 - 46.0 %    MCV 93 80 - 100 fL    MCH 29.8 26.0 - 34.0 pg    MCHC 32.3 32.0 - 36.0 g/dL    RDW 15.9 (H) 11.5 - 14.5 %    Platelets 260 150 - 450 x10*3/uL   Basic Metabolic Panel   Result Value Ref Range    Glucose 99 74 - 99 mg/dL    Sodium 137 136 - 145 mmol/L    Potassium 4.5 3.5 - 5.3 mmol/L    Chloride 102 98 - 107 mmol/L    Bicarbonate 24 21 - 32 mmol/L    Anion Gap 16 10 - 20 mmol/L    Urea Nitrogen 17 6 - 23 mg/dL    Creatinine 0.67 0.50 - 1.05 mg/dL    eGFR >90 >60 mL/min/1.73m*2    Calcium 8.6 8.6 - 10.3 mg/dL      XR abdomen 2 views supine and erect or decub    (Results Pending)                           Assessment & Plan    Neuro: Acute post-op pain well controlled     Hx: anxiety, B/l sciatica, R cervical and lumbar radiculopathy, OA, DDD   - OOB ambulate as tolerated at least 5 times per day  - Continue current pain control regimen    Cardiovascular: /80 (Patient Position: Sitting)   Pulse 84    - VS Q4h    Pulmonary: Non labored breathing on RA  - Encourage IS  - Encourage ambulation    Gastrointestinal: POD 3 Lx splenic flexure takedown ; Laparoscopic splenic flexure resection ; Colostomy closure ;   SAMI block Findings: Minimal adhesions, tattoo proximal and distal to flexure noted.  Colon resection with great bleeding at the colon edge and      Hx: LBO  - Low fiber diet back off to ice chips today   - PRN antiemetic  - continue current bowel regimen     Genitourinary: Voiding independently  - continue mIVF  - Monitor I&Os   - Cr: 0.67  - Continue to trend electrolytes with daily labs    Hematology:   - H/H: 9.4/29.5 -> 9.1/28.2  - DVT ppx: SCDs/ Lovenox  - No active s/s of bleeding    Infectious Disease:   - WBC: 10.9 -> 8.1  - Afebrile, VSS  - Continue to monitor for s/s of infection  - Continue to monitor WBC with daily CBC     Discussed with Dr. Valencia., going slow with diet, and watching bloating. Limit narcotics. Waiting for return of bowel  function      I spent 50 minutes in the professional and overall care of this patient.      Toby Brantley PA-C         [1] acetaminophen, 650 mg, oral, q6h  enoxaparin, 40 mg, subcutaneous, q24h  gabapentin, 1,200 mg, oral, q AM  gabapentin, 1,800 mg, oral, Nightly  iron sucrose, 100 mg, intravenous, Daily  levothyroxine, 100 mcg, oral, Daily  lidocaine, 1 patch, transdermal, Daily  scopolamine, 1 patch, transdermal, q72h     [2]    [3] PRN medications: HYDROmorphone, magnesium hydroxide, naloxone, ondansetron **OR** ondansetron, oxyCODONE

## 2025-06-19 NOTE — CARE PLAN
The patient's goals for the shift include      The clinical goals for the shift include pt will be free from pain throughout shift

## 2025-06-20 ENCOUNTER — APPOINTMENT (OUTPATIENT)
Dept: RADIOLOGY | Facility: HOSPITAL | Age: 65
DRG: 330 | End: 2025-06-20
Payer: MEDICARE

## 2025-06-20 LAB
ANION GAP SERPL CALC-SCNC: 13 MMOL/L (ref 10–20)
BUN SERPL-MCNC: 11 MG/DL (ref 6–23)
CALCIUM SERPL-MCNC: 8.7 MG/DL (ref 8.6–10.3)
CHLORIDE SERPL-SCNC: 100 MMOL/L (ref 98–107)
CO2 SERPL-SCNC: 27 MMOL/L (ref 21–32)
CREAT SERPL-MCNC: 0.59 MG/DL (ref 0.5–1.05)
EGFRCR SERPLBLD CKD-EPI 2021: >90 ML/MIN/1.73M*2
ERYTHROCYTE [DISTWIDTH] IN BLOOD BY AUTOMATED COUNT: 15.8 % (ref 11.5–14.5)
GLUCOSE SERPL-MCNC: 107 MG/DL (ref 74–99)
HCT VFR BLD AUTO: 27.2 % (ref 36–46)
HGB BLD-MCNC: 8.9 G/DL (ref 12–16)
MCH RBC QN AUTO: 29.7 PG (ref 26–34)
MCHC RBC AUTO-ENTMCNC: 32.7 G/DL (ref 32–36)
MCV RBC AUTO: 91 FL (ref 80–100)
NRBC BLD-RTO: 0 /100 WBCS (ref 0–0)
PLATELET # BLD AUTO: 395 X10*3/UL (ref 150–450)
POTASSIUM SERPL-SCNC: 3.8 MMOL/L (ref 3.5–5.3)
RBC # BLD AUTO: 3 X10*6/UL (ref 4–5.2)
SODIUM SERPL-SCNC: 136 MMOL/L (ref 136–145)
WBC # BLD AUTO: 6.9 X10*3/UL (ref 4.4–11.3)

## 2025-06-20 PROCEDURE — 2500000004 HC RX 250 GENERAL PHARMACY W/ HCPCS (ALT 636 FOR OP/ED): Performed by: COLON & RECTAL SURGERY

## 2025-06-20 PROCEDURE — 85027 COMPLETE CBC AUTOMATED: CPT

## 2025-06-20 PROCEDURE — 2500000001 HC RX 250 WO HCPCS SELF ADMINISTERED DRUGS (ALT 637 FOR MEDICARE OP): Performed by: COLON & RECTAL SURGERY

## 2025-06-20 PROCEDURE — 99233 SBSQ HOSP IP/OBS HIGH 50: CPT

## 2025-06-20 PROCEDURE — 71045 X-RAY EXAM CHEST 1 VIEW: CPT | Performed by: RADIOLOGY

## 2025-06-20 PROCEDURE — 2500000005 HC RX 250 GENERAL PHARMACY W/O HCPCS

## 2025-06-20 PROCEDURE — 74018 RADEX ABDOMEN 1 VIEW: CPT

## 2025-06-20 PROCEDURE — 2500000004 HC RX 250 GENERAL PHARMACY W/ HCPCS (ALT 636 FOR OP/ED): Mod: JZ

## 2025-06-20 PROCEDURE — 36415 COLL VENOUS BLD VENIPUNCTURE: CPT

## 2025-06-20 PROCEDURE — 2500000001 HC RX 250 WO HCPCS SELF ADMINISTERED DRUGS (ALT 637 FOR MEDICARE OP)

## 2025-06-20 PROCEDURE — 2500000004 HC RX 250 GENERAL PHARMACY W/ HCPCS (ALT 636 FOR OP/ED): Performed by: STUDENT IN AN ORGANIZED HEALTH CARE EDUCATION/TRAINING PROGRAM

## 2025-06-20 PROCEDURE — 80048 BASIC METABOLIC PNL TOTAL CA: CPT

## 2025-06-20 PROCEDURE — 1100000001 HC PRIVATE ROOM DAILY

## 2025-06-20 PROCEDURE — 2500000001 HC RX 250 WO HCPCS SELF ADMINISTERED DRUGS (ALT 637 FOR MEDICARE OP): Performed by: STUDENT IN AN ORGANIZED HEALTH CARE EDUCATION/TRAINING PROGRAM

## 2025-06-20 PROCEDURE — 2500000002 HC RX 250 W HCPCS SELF ADMINISTERED DRUGS (ALT 637 FOR MEDICARE OP, ALT 636 FOR OP/ED): Performed by: STUDENT IN AN ORGANIZED HEALTH CARE EDUCATION/TRAINING PROGRAM

## 2025-06-20 PROCEDURE — 74018 RADEX ABDOMEN 1 VIEW: CPT | Performed by: RADIOLOGY

## 2025-06-20 RX ORDER — DEXTROSE MONOHYDRATE, SODIUM CHLORIDE, AND POTASSIUM CHLORIDE 50; 1.49; 9 G/1000ML; G/1000ML; G/1000ML
75 INJECTION, SOLUTION INTRAVENOUS CONTINUOUS
Status: DISCONTINUED | OUTPATIENT
Start: 2025-06-20 | End: 2025-06-23

## 2025-06-20 RX ADMIN — ENOXAPARIN SODIUM 40 MG: 40 INJECTION SUBCUTANEOUS at 08:35

## 2025-06-20 RX ADMIN — ACETAMINOPHEN 650 MG: 325 TABLET, FILM COATED ORAL at 04:29

## 2025-06-20 RX ADMIN — LIDOCAINE 4% 1 PATCH: 40 PATCH TOPICAL at 08:35

## 2025-06-20 RX ADMIN — GABAPENTIN 1200 MG: 400 CAPSULE ORAL at 08:34

## 2025-06-20 RX ADMIN — HYDROMORPHONE HYDROCHLORIDE 0.2 MG: 0.2 INJECTION, SOLUTION INTRAMUSCULAR; INTRAVENOUS; SUBCUTANEOUS at 15:11

## 2025-06-20 RX ADMIN — ACETAMINOPHEN 650 MG: 325 TABLET, FILM COATED ORAL at 13:28

## 2025-06-20 RX ADMIN — OXYCODONE HYDROCHLORIDE 5 MG: 5 TABLET ORAL at 23:54

## 2025-06-20 RX ADMIN — ACETAMINOPHEN 650 MG: 325 TABLET, FILM COATED ORAL at 18:55

## 2025-06-20 RX ADMIN — GABAPENTIN 1800 MG: 300 CAPSULE ORAL at 20:41

## 2025-06-20 RX ADMIN — OXYCODONE HYDROCHLORIDE 5 MG: 5 TABLET ORAL at 08:35

## 2025-06-20 RX ADMIN — HYDROMORPHONE HYDROCHLORIDE 0.2 MG: 0.2 INJECTION, SOLUTION INTRAMUSCULAR; INTRAVENOUS; SUBCUTANEOUS at 10:55

## 2025-06-20 RX ADMIN — HYDROMORPHONE HYDROCHLORIDE 0.2 MG: 0.2 INJECTION, SOLUTION INTRAMUSCULAR; INTRAVENOUS; SUBCUTANEOUS at 04:27

## 2025-06-20 RX ADMIN — POTASSIUM CHLORIDE, DEXTROSE MONOHYDRATE AND SODIUM CHLORIDE 75 ML/HR: 150; 5; 900 INJECTION, SOLUTION INTRAVENOUS at 15:08

## 2025-06-20 RX ADMIN — MAGNESIUM HYDROXIDE 30 ML: 1200 LIQUID ORAL at 08:36

## 2025-06-20 RX ADMIN — OXYCODONE HYDROCHLORIDE 5 MG: 5 TABLET ORAL at 17:43

## 2025-06-20 RX ADMIN — IRON SUCROSE 100 MG: 20 INJECTION, SOLUTION INTRAVENOUS at 08:37

## 2025-06-20 RX ADMIN — LEVOTHYROXINE SODIUM 100 MCG: 0.1 TABLET ORAL at 04:28

## 2025-06-20 RX ADMIN — HYDROMORPHONE HYDROCHLORIDE 0.2 MG: 0.2 INJECTION, SOLUTION INTRAMUSCULAR; INTRAVENOUS; SUBCUTANEOUS at 20:42

## 2025-06-20 RX ADMIN — ACETAMINOPHEN 650 MG: 325 TABLET, FILM COATED ORAL at 23:54

## 2025-06-20 ASSESSMENT — COGNITIVE AND FUNCTIONAL STATUS - GENERAL
DAILY ACTIVITIY SCORE: 24
DAILY ACTIVITIY SCORE: 24
MOBILITY SCORE: 24
DAILY ACTIVITIY SCORE: 24
DAILY ACTIVITIY SCORE: 24
MOBILITY SCORE: 24
MOBILITY SCORE: 24
DAILY ACTIVITIY SCORE: 24
MOBILITY SCORE: 24
DAILY ACTIVITIY SCORE: 24
DAILY ACTIVITIY SCORE: 24

## 2025-06-20 ASSESSMENT — PAIN SCALES - GENERAL
PAINLEVEL_OUTOF10: 8
PAINLEVEL_OUTOF10: 10 - WORST POSSIBLE PAIN
PAINLEVEL_OUTOF10: 6
PAINLEVEL_OUTOF10: 8
PAINLEVEL_OUTOF10: 4
PAINLEVEL_OUTOF10: 4
PAINLEVEL_OUTOF10: 7
PAINLEVEL_OUTOF10: 7
PAINLEVEL_OUTOF10: 3

## 2025-06-20 ASSESSMENT — PAIN DESCRIPTION - ORIENTATION
ORIENTATION: UPPER
ORIENTATION: MID;LOWER
ORIENTATION: ANTERIOR

## 2025-06-20 ASSESSMENT — PAIN - FUNCTIONAL ASSESSMENT
PAIN_FUNCTIONAL_ASSESSMENT: CPOT (CRITICAL CARE PAIN OBSERVATION TOOL)
PAIN_FUNCTIONAL_ASSESSMENT: 0-10

## 2025-06-20 ASSESSMENT — PAIN DESCRIPTION - LOCATION
LOCATION: ABDOMEN

## 2025-06-20 ASSESSMENT — PAIN DESCRIPTION - DESCRIPTORS
DESCRIPTORS: CRAMPING
DESCRIPTORS: ACHING
DESCRIPTORS: ACHING;CRAMPING

## 2025-06-20 NOTE — PROGRESS NOTES
"Barbara Sow is a 65 y.o. female on day 4 of admission presenting with Large bowel obstruction (Multi).    Subjective   Patient is awake in bed this morning. She reports feeling very bloated and uncomfortable. She has been up walking around trying to have a bowel movement. Reports she did finally start passing some gas.       Objective     Physical Exam  Constitutional:       Appearance: Normal appearance.   Cardiovascular:      Rate and Rhythm: Normal rate and regular rhythm.   Pulmonary:      Effort: Pulmonary effort is normal.      Comments: Non labored breathing on RA  Abdominal:      General: There is distension (firm).      Tenderness: There is abdominal tenderness.      Comments: Lap sites C/D/I, edges well approximated, covered in Dermabond. ABD binder on    Skin:     General: Skin is warm and dry.   Neurological:      General: No focal deficit present.      Mental Status: She is alert and oriented to person, place, and time. Mental status is at baseline.   Psychiatric:         Attention and Perception: Attention normal.         Mood and Affect: Mood normal.         Speech: Speech normal.         Behavior: Behavior normal.         Cognition and Memory: Cognition normal.         Last Recorded Vitals  Blood pressure 135/83, pulse 81, temperature 36.4 °C (97.5 °F), resp. rate 20, height 1.702 m (5' 7\"), weight 62 kg (136 lb 11 oz), SpO2 92%.  Intake/Output last 3 Shifts:  I/O last 3 completed shifts:  In: 1266.7 (20.4 mL/kg) [P.O.:600; IV Piggyback:666.7]  Out: - (0 mL/kg)   Weight: 62 kg     Relevant Results  Scheduled medications  Scheduled Medications[1]  Continuous medications  Continuous Medications[2]  PRN medications  PRN Medications[3]   Results for orders placed or performed during the hospital encounter of 06/16/25 (from the past 24 hours)   Basic Metabolic Panel   Result Value Ref Range    Glucose 107 (H) 74 - 99 mg/dL    Sodium 136 136 - 145 mmol/L    Potassium 3.8 3.5 - 5.3 mmol/L    " Chloride 100 98 - 107 mmol/L    Bicarbonate 27 21 - 32 mmol/L    Anion Gap 13 10 - 20 mmol/L    Urea Nitrogen 11 6 - 23 mg/dL    Creatinine 0.59 0.50 - 1.05 mg/dL    eGFR >90 >60 mL/min/1.73m*2    Calcium 8.7 8.6 - 10.3 mg/dL   CBC   Result Value Ref Range    WBC 6.9 4.4 - 11.3 x10*3/uL    nRBC 0.0 0.0 - 0.0 /100 WBCs    RBC 3.00 (L) 4.00 - 5.20 x10*6/uL    Hemoglobin 8.9 (L) 12.0 - 16.0 g/dL    Hematocrit 27.2 (L) 36.0 - 46.0 %    MCV 91 80 - 100 fL    MCH 29.7 26.0 - 34.0 pg    MCHC 32.7 32.0 - 36.0 g/dL    RDW 15.8 (H) 11.5 - 14.5 %    Platelets 395 150 - 450 x10*3/uL      XR abdomen 2 views supine and erect or decub   Final Result   Moderate stool in the cecum. Moderate gaseous distention of multiple   small and large bowel loops as described. Question diffuse ileus.   Distal colonic obstruction is not excluded.        No pneumoperitoneum.        MACRO:   None        Signed by: Todd Sales 6/19/2025 1:17 PM   Dictation workstation:   [id number]                              Assessment & Plan    Neuro: Acute post-op pain well controlled     Hx: anxiety, B/l sciatica, R cervical and lumbar radiculopathy, OA, DDD   - OOB ambulate as tolerated at least 5 times per day  - Continue current pain control regimen    Cardiovascular: /83   Pulse 81      - VS Q4h    Pulmonary: Non labored breathing on RA  - Encourage IS  - Encourage ambulation    Gastrointestinal: POD 4 Lx splenic flexure takedown ; Laparoscopic splenic flexure resection ; Colostomy closure ;   SAMI block Findings: Minimal adhesions, tattoo proximal and distal to flexure noted.  Colon resection with great bleeding at the colon edge and      Hx: LBO  - Low fiber diet back off to ice chips today   - PRN antiemetic  - KUB yesterday with air in colon  - Continue with MoM     Genitourinary: Voiding independently  - continue mIVF  - Monitor I&Os   - Cr: 0.59  - Continue to trend electrolytes with daily labs    Hematology:   - H/H: 9.1/28.2 -> 8.9/27.2  -  DVT ppx: SCDs/ Lovenox  - No active s/s of bleeding    Infectious Disease:   - WBC: 8.1 -> 6.9  - Afebrile, VSS  - Continue to monitor for s/s of infection  - Continue to monitor WBC with daily CBC     Discussed with Dr. Valencia. Limit PO intake until starting to have some return of bowel function. Hoping to discharge over the weekend after she opens up.       I spent 50 minutes in the professional and overall care of this patient.      Toby Brantley PA-C      Pt seen -- very bloated , pain on both lateral abd    Gen - laying in bed asleep, , rubbing belly when she wakes up   Abd -VERY distended,  appr pain, incision covered,   Extr - no edema    Impression - Pt POD 3 s/p colostomy closure and colon resection with colonic ileus   Plan   Place NGT  Lots of walking   Minimize narcotics  Replete lytes  Start IVF   If colon gets larger she may need neostigmine vs colonoscopic decompression.  Where her SB is dilated and she is passing gas it is likely that we will be able to avoid that.        [1] acetaminophen, 650 mg, oral, q6h  enoxaparin, 40 mg, subcutaneous, q24h  gabapentin, 1,200 mg, oral, q AM  gabapentin, 1,800 mg, oral, Nightly  iron sucrose, 100 mg, intravenous, Daily  levothyroxine, 100 mcg, oral, Daily  lidocaine, 1 patch, transdermal, Daily  scopolamine, 1 patch, transdermal, q72h     [2]    [3] PRN medications: HYDROmorphone, magnesium hydroxide, naloxone, ondansetron **OR** ondansetron, oxyCODONE

## 2025-06-20 NOTE — PROGRESS NOTES
06/20/25 1016   Rapid Rounds   Attendance Care Transitions;Other (comment)   Additional Roles Surgical PA, colorectal student, nurse manager   Expected Discharge Disposition Home   Today we still await: Clinical stability  (Needs to have a bowel movement)   Review at Escalation Rounds No escalation needed

## 2025-06-21 ENCOUNTER — APPOINTMENT (OUTPATIENT)
Dept: RADIOLOGY | Facility: HOSPITAL | Age: 65
DRG: 330 | End: 2025-06-21
Payer: MEDICARE

## 2025-06-21 LAB
ANION GAP SERPL CALC-SCNC: 14 MMOL/L (ref 10–20)
BUN SERPL-MCNC: 14 MG/DL (ref 6–23)
CALCIUM SERPL-MCNC: 8.6 MG/DL (ref 8.6–10.3)
CHLORIDE SERPL-SCNC: 98 MMOL/L (ref 98–107)
CO2 SERPL-SCNC: 29 MMOL/L (ref 21–32)
CREAT SERPL-MCNC: 0.64 MG/DL (ref 0.5–1.05)
EGFRCR SERPLBLD CKD-EPI 2021: >90 ML/MIN/1.73M*2
ERYTHROCYTE [DISTWIDTH] IN BLOOD BY AUTOMATED COUNT: 15.9 % (ref 11.5–14.5)
GLUCOSE SERPL-MCNC: 91 MG/DL (ref 74–99)
HCT VFR BLD AUTO: 26 % (ref 36–46)
HGB BLD-MCNC: 8.7 G/DL (ref 12–16)
MAGNESIUM SERPL-MCNC: 2.76 MG/DL (ref 1.6–2.4)
MCH RBC QN AUTO: 29.7 PG (ref 26–34)
MCHC RBC AUTO-ENTMCNC: 33.5 G/DL (ref 32–36)
MCV RBC AUTO: 89 FL (ref 80–100)
NRBC BLD-RTO: 0 /100 WBCS (ref 0–0)
PHOSPHATE SERPL-MCNC: 3.2 MG/DL (ref 2.5–4.9)
PLATELET # BLD AUTO: 413 X10*3/UL (ref 150–450)
POTASSIUM SERPL-SCNC: 4.6 MMOL/L (ref 3.5–5.3)
RBC # BLD AUTO: 2.93 X10*6/UL (ref 4–5.2)
SODIUM SERPL-SCNC: 136 MMOL/L (ref 136–145)
WBC # BLD AUTO: 7.7 X10*3/UL (ref 4.4–11.3)

## 2025-06-21 PROCEDURE — 85027 COMPLETE CBC AUTOMATED: CPT

## 2025-06-21 PROCEDURE — 2500000004 HC RX 250 GENERAL PHARMACY W/ HCPCS (ALT 636 FOR OP/ED)

## 2025-06-21 PROCEDURE — 84100 ASSAY OF PHOSPHORUS: CPT | Performed by: STUDENT IN AN ORGANIZED HEALTH CARE EDUCATION/TRAINING PROGRAM

## 2025-06-21 PROCEDURE — 2500000005 HC RX 250 GENERAL PHARMACY W/O HCPCS

## 2025-06-21 PROCEDURE — 83735 ASSAY OF MAGNESIUM: CPT | Performed by: STUDENT IN AN ORGANIZED HEALTH CARE EDUCATION/TRAINING PROGRAM

## 2025-06-21 PROCEDURE — 2550000001 HC RX 255 CONTRASTS: Performed by: COLON & RECTAL SURGERY

## 2025-06-21 PROCEDURE — 99232 SBSQ HOSP IP/OBS MODERATE 35: CPT | Performed by: STUDENT IN AN ORGANIZED HEALTH CARE EDUCATION/TRAINING PROGRAM

## 2025-06-21 PROCEDURE — 2060000001 HC INTERMEDIATE ICU ROOM DAILY

## 2025-06-21 PROCEDURE — 36415 COLL VENOUS BLD VENIPUNCTURE: CPT

## 2025-06-21 PROCEDURE — 2500000004 HC RX 250 GENERAL PHARMACY W/ HCPCS (ALT 636 FOR OP/ED): Performed by: ANESTHESIOLOGY

## 2025-06-21 PROCEDURE — 99222 1ST HOSP IP/OBS MODERATE 55: CPT | Performed by: ANESTHESIOLOGY

## 2025-06-21 PROCEDURE — 74176 CT ABD & PELVIS W/O CONTRAST: CPT | Performed by: RADIOLOGY

## 2025-06-21 PROCEDURE — 2500000004 HC RX 250 GENERAL PHARMACY W/ HCPCS (ALT 636 FOR OP/ED): Mod: JZ

## 2025-06-21 PROCEDURE — 74019 RADEX ABDOMEN 2 VIEWS: CPT

## 2025-06-21 PROCEDURE — 74018 RADEX ABDOMEN 1 VIEW: CPT

## 2025-06-21 PROCEDURE — 2500000002 HC RX 250 W HCPCS SELF ADMINISTERED DRUGS (ALT 637 FOR MEDICARE OP, ALT 636 FOR OP/ED): Performed by: STUDENT IN AN ORGANIZED HEALTH CARE EDUCATION/TRAINING PROGRAM

## 2025-06-21 PROCEDURE — 2500000001 HC RX 250 WO HCPCS SELF ADMINISTERED DRUGS (ALT 637 FOR MEDICARE OP)

## 2025-06-21 PROCEDURE — 74176 CT ABD & PELVIS W/O CONTRAST: CPT

## 2025-06-21 PROCEDURE — 74018 RADEX ABDOMEN 1 VIEW: CPT | Performed by: RADIOLOGY

## 2025-06-21 PROCEDURE — 2500000001 HC RX 250 WO HCPCS SELF ADMINISTERED DRUGS (ALT 637 FOR MEDICARE OP): Performed by: STUDENT IN AN ORGANIZED HEALTH CARE EDUCATION/TRAINING PROGRAM

## 2025-06-21 PROCEDURE — 2500000004 HC RX 250 GENERAL PHARMACY W/ HCPCS (ALT 636 FOR OP/ED): Performed by: SURGERY

## 2025-06-21 PROCEDURE — 80048 BASIC METABOLIC PNL TOTAL CA: CPT

## 2025-06-21 PROCEDURE — 2500000004 HC RX 250 GENERAL PHARMACY W/ HCPCS (ALT 636 FOR OP/ED): Performed by: STUDENT IN AN ORGANIZED HEALTH CARE EDUCATION/TRAINING PROGRAM

## 2025-06-21 PROCEDURE — 2500000004 HC RX 250 GENERAL PHARMACY W/ HCPCS (ALT 636 FOR OP/ED): Performed by: COLON & RECTAL SURGERY

## 2025-06-21 PROCEDURE — 71045 X-RAY EXAM CHEST 1 VIEW: CPT | Performed by: RADIOLOGY

## 2025-06-21 RX ORDER — DIATRIZOATE MEGLUMINE AND DIATRIZOATE SODIUM 660; 100 MG/ML; MG/ML
30 SOLUTION ORAL; RECTAL ONCE
Status: COMPLETED | OUTPATIENT
Start: 2025-06-21 | End: 2025-06-21

## 2025-06-21 RX ORDER — NEOSTIGMINE METHYLSULFATE 0.5 MG/ML
2 INJECTION INTRAVENOUS ONCE
Status: COMPLETED | OUTPATIENT
Start: 2025-06-21 | End: 2025-06-21

## 2025-06-21 RX ORDER — NEOSTIGMINE METHYLSULFATE 0.5 MG/ML
3 INJECTION INTRAVENOUS ONCE
Status: COMPLETED | OUTPATIENT
Start: 2025-06-21 | End: 2025-06-21

## 2025-06-21 RX ORDER — LORAZEPAM 2 MG/ML
0.5 INJECTION INTRAMUSCULAR ONCE
Status: COMPLETED | OUTPATIENT
Start: 2025-06-21 | End: 2025-06-21

## 2025-06-21 RX ORDER — ATROPINE SULFATE 0.1 MG/ML
1 INJECTION INTRAVENOUS ONCE
Status: DISCONTINUED | OUTPATIENT
Start: 2025-06-21 | End: 2025-06-25 | Stop reason: HOSPADM

## 2025-06-21 RX ADMIN — GABAPENTIN 1800 MG: 300 CAPSULE ORAL at 22:19

## 2025-06-21 RX ADMIN — NEOSTIGMINE METHYLSULFATE 3 MG: 0.5 INJECTION INTRAVENOUS at 23:58

## 2025-06-21 RX ADMIN — HYDROMORPHONE HYDROCHLORIDE 0.2 MG: 0.2 INJECTION, SOLUTION INTRAMUSCULAR; INTRAVENOUS; SUBCUTANEOUS at 17:57

## 2025-06-21 RX ADMIN — ENOXAPARIN SODIUM 40 MG: 40 INJECTION SUBCUTANEOUS at 09:17

## 2025-06-21 RX ADMIN — IRON SUCROSE 100 MG: 20 INJECTION, SOLUTION INTRAVENOUS at 16:34

## 2025-06-21 RX ADMIN — HYDROMORPHONE HYDROCHLORIDE 0.2 MG: 0.2 INJECTION, SOLUTION INTRAMUSCULAR; INTRAVENOUS; SUBCUTANEOUS at 22:26

## 2025-06-21 RX ADMIN — ACETAMINOPHEN 650 MG: 325 TABLET, FILM COATED ORAL at 17:57

## 2025-06-21 RX ADMIN — ACETAMINOPHEN 650 MG: 325 TABLET, FILM COATED ORAL at 12:51

## 2025-06-21 RX ADMIN — GABAPENTIN 1200 MG: 400 CAPSULE ORAL at 09:17

## 2025-06-21 RX ADMIN — LORAZEPAM 0.5 MG: 2 INJECTION INTRAMUSCULAR; INTRAVENOUS at 02:52

## 2025-06-21 RX ADMIN — DIATRIZOATE MEGLUMINE AND DIATRIZOATE SODIUM 30 ML: 660; 100 LIQUID ORAL; RECTAL at 17:36

## 2025-06-21 RX ADMIN — NEOSTIGMINE METHYLSULFATE 2 MG: 0.5 INJECTION INTRAVENOUS at 21:30

## 2025-06-21 RX ADMIN — LEVOTHYROXINE SODIUM 100 MCG: 0.1 TABLET ORAL at 06:31

## 2025-06-21 RX ADMIN — LIDOCAINE 4% 1 PATCH: 40 PATCH TOPICAL at 09:17

## 2025-06-21 RX ADMIN — ACETAMINOPHEN 650 MG: 325 TABLET, FILM COATED ORAL at 06:30

## 2025-06-21 RX ADMIN — OXYCODONE HYDROCHLORIDE 5 MG: 5 TABLET ORAL at 15:37

## 2025-06-21 ASSESSMENT — PAIN SCALES - GENERAL
PAINLEVEL_OUTOF10: 9
PAINLEVEL_OUTOF10: 7
PAINLEVEL_OUTOF10: 10 - WORST POSSIBLE PAIN
PAINLEVEL_OUTOF10: 9
PAINLEVEL_OUTOF10: 0 - NO PAIN
PAINLEVEL_OUTOF10: 7
PAINLEVEL_OUTOF10: 5 - MODERATE PAIN
PAINLEVEL_OUTOF10: 7

## 2025-06-21 ASSESSMENT — PAIN - FUNCTIONAL ASSESSMENT
PAIN_FUNCTIONAL_ASSESSMENT: 0-10

## 2025-06-21 ASSESSMENT — COGNITIVE AND FUNCTIONAL STATUS - GENERAL
DAILY ACTIVITIY SCORE: 24
DAILY ACTIVITIY SCORE: 24
MOBILITY SCORE: 24
DAILY ACTIVITIY SCORE: 24
MOBILITY SCORE: 24
MOBILITY SCORE: 24

## 2025-06-21 ASSESSMENT — PAIN DESCRIPTION - DESCRIPTORS
DESCRIPTORS: ACHING;TIGHTNESS;THROBBING
DESCRIPTORS: ACHING;PRESSURE;SHOOTING

## 2025-06-21 ASSESSMENT — PAIN DESCRIPTION - ORIENTATION: ORIENTATION: MID

## 2025-06-21 ASSESSMENT — PAIN DESCRIPTION - LOCATION: LOCATION: ABDOMEN

## 2025-06-21 NOTE — PROGRESS NOTES
"Barbara Sow is a 65 y.o. female on day 5 of admission presenting with Large bowel obstruction (Multi).    Subjective   NG accidentally removed overnight.  No new gas or stool since yesterday.       Objective     Physical Exam  Constitutional:       Appearance: Normal appearance.   HENT:      Head: Normocephalic.   Cardiovascular:      Rate and Rhythm: Normal rate and regular rhythm.      Pulses: Normal pulses.   Pulmonary:      Effort: Pulmonary effort is normal.   Abdominal:      General: Bowel sounds are normal.      Palpations: Abdomen is soft.      Comments: Taut, very distended, nontender   Musculoskeletal:         General: Normal range of motion.   Skin:     General: Skin is warm.   Neurological:      General: No focal deficit present.      Mental Status: She is alert.   Psychiatric:         Mood and Affect: Mood normal.         Behavior: Behavior normal.         Last Recorded Vitals  Blood pressure 106/60, pulse 70, temperature 36.9 °C (98.4 °F), resp. rate 20, height 1.702 m (5' 7\"), weight 62 kg (136 lb 11 oz), SpO2 92%.  Intake/Output last 3 Shifts:  I/O last 3 completed shifts:  In: 776.3 (12.5 mL/kg) [P.O.:360; IV Piggyback:416.3]  Out: 900 (14.5 mL/kg) [Emesis/NG output:900]  Weight: 62 kg     Relevant Results                              Assessment & Plan  Large bowel obstruction (Multi)    Status post colostomy closure with colonic ileus  Increasing colonic dilation on imaging and now with obstipation  Will transfer to stepdown with telemetry for neostigmine 2 mg over 5 minutes today plan to have atropine available.        I spent 25 minutes in the professional and overall care of this patient.      Mehrdad Garner MD    "

## 2025-06-21 NOTE — CARE PLAN
The patient's goals for the shift include      The clinical goals for the shift include pain managed

## 2025-06-21 NOTE — PROGRESS NOTES
"Barbara Sow is a 65 y.o. female on day 5 of admission presenting with Large bowel obstruction (Multi).    Subjective   Pulled out NG x 2 overnight. Still has not passed gas having diffuse abdominal pain and severe distention       Objective     PE:  Constitutional: A&Ox3, calm and cooperative, NAD  Eyes: EOMI, clear sclera   Cardiovascular: Normal rate and regular rhythm  Respiratory/Thorax: CTAB, on RA  Gastrointestinal: abd taut, severely distended, hypoactive bowel sounds, diffusely tender  Genitourinary: Voiding independently   Musculoskeletal: ROM intact  Extremities: No peripheral edema  Neurological: A&Ox3, No focal deficits   Psychological: Appropriate mood and behavior      Last Recorded Vitals  Blood pressure 123/70, pulse 84, temperature 36.5 °C (97.7 °F), resp. rate 18, height 1.702 m (5' 7\"), weight 62 kg (136 lb 11 oz), SpO2 93%.  Intake/Output last 3 Shifts:  I/O last 3 completed shifts:  In: 776.3 (12.5 mL/kg) [P.O.:360; IV Piggyback:416.3]  Out: 900 (14.5 mL/kg) [Emesis/NG output:900]  Weight: 62 kg     Relevant Results  Scheduled medications  Scheduled Medications[1]  Continuous medications  Continuous Medications[2]  PRN medications  PRN Medications[3]   Results for orders placed or performed during the hospital encounter of 06/16/25 (from the past 24 hours)   Basic Metabolic Panel   Result Value Ref Range    Glucose 91 74 - 99 mg/dL    Sodium 136 136 - 145 mmol/L    Potassium 4.6 3.5 - 5.3 mmol/L    Chloride 98 98 - 107 mmol/L    Bicarbonate 29 21 - 32 mmol/L    Anion Gap 14 10 - 20 mmol/L    Urea Nitrogen 14 6 - 23 mg/dL    Creatinine 0.64 0.50 - 1.05 mg/dL    eGFR >90 >60 mL/min/1.73m*2    Calcium 8.6 8.6 - 10.3 mg/dL   CBC   Result Value Ref Range    WBC 7.7 4.4 - 11.3 x10*3/uL    nRBC 0.0 0.0 - 0.0 /100 WBCs    RBC 2.93 (L) 4.00 - 5.20 x10*6/uL    Hemoglobin 8.7 (L) 12.0 - 16.0 g/dL    Hematocrit 26.0 (L) 36.0 - 46.0 %    MCV 89 80 - 100 fL    MCH 29.7 26.0 - 34.0 pg    MCHC 33.5 32.0 " - 36.0 g/dL    RDW 15.9 (H) 11.5 - 14.5 %    Platelets 413 150 - 450 x10*3/uL      XR chest abdomen for OG NG placement   Final Result   NG tube as above.             MACRO:   None        Signed by: Tom Foss 6/21/2025 3:18 AM   Dictation workstation:   [id number]      XR chest abdomen for OG NG placement   Final Result   NG tube in place as described.        Mild atelectasis at the lung bases.        Cardiomegaly.        Nonspecific gaseous distention of large and small bowel loops in the   imaged upper abdomen.        MACRO:   None        Signed by: Todd Sales 6/20/2025 4:14 PM   Dictation workstation:   [id number]      XR abdomen 2 views supine and erect or decub   Final Result   Moderate stool in the cecum. Moderate gaseous distention of multiple   small and large bowel loops as described. Question diffuse ileus.   Distal colonic obstruction is not excluded.        No pneumoperitoneum.        MACRO:   None        Signed by: Todd Sales 6/19/2025 1:17 PM   Dictation workstation:   [id number]      XR abdomen 2 views supine and erect or decub    (Results Pending)                           Assessment & Plan  Large bowel obstruction (Multi)    Neuro: Acute post-op pain well controlled     Hx: anxiety, B/l sciatica, R cervical and lumbar radiculopathy, OA, DDD   - OOB ambulate as tolerated at least 5 times per day  - Continue current pain control regimen    Cardiovascular: /83   Pulse 81      - VS Q4h  - IVF    Pulmonary: Non labored breathing on RA  - Encourage IS  - Encourage ambulation    Gastrointestinal: POD 5 Lx splenic flexure takedown ; Laparoscopic splenic flexure resection ; Colostomy closure ;   SAMI block Findings: Minimal adhesions, tattoo proximal and distal to flexure noted.  Colon resection with great bleeding at the colon edge and      Hx: LBO  - NPO  - KUB  - hold off on NG replacement   - PRN antiemetic  - stepdown transfer for neostigmine administration     Genitourinary: Voiding  independently  - continue mIVF  - Monitor I&Os   - Cr: 0.64  - Continue to trend electrolytes with daily labs    Hematology:   - H/H stable   - DVT ppx: SCDs/ Lovenox  - No active s/s of bleeding  - venofer      Endo:  Hx hypothyroidism   - continue home synthroid     Infectious Disease:   - WBC WNL  - Afebrile, VSS  - Continue to monitor for s/s of infection  - Continue to monitor WBC with daily CBC     Discussed with Patsy. Stepdown transfer for neostigmine administration     I spent 50 minutes in the professional and overall care of this patient.      Talia Weeks PA-C           [1] acetaminophen, 650 mg, oral, q6h  enoxaparin, 40 mg, subcutaneous, q24h  gabapentin, 1,200 mg, oral, q AM  gabapentin, 1,800 mg, oral, Nightly  iron sucrose, 100 mg, intravenous, Daily  levothyroxine, 100 mcg, oral, Daily  lidocaine, 1 patch, transdermal, Daily  scopolamine, 1 patch, transdermal, q72h     [2] potassium chloride-D5-0.9%NaCl, 75 mL/hr, Last Rate: Stopped (25 0645)     [3] PRN medications: HYDROmorphone, magnesium hydroxide, naloxone, ondansetron **OR** ondansetron, oxyCODONE

## 2025-06-21 NOTE — NURSING NOTE
"While rounding about 0200 pt NGT was noted to be displaced and lying in the sink while patient was resting in the recliner at bedside. Pt stated \"I started gagging and it started coming out so I just pulled the rest out.\" I made the pt aware that in instances as stated above she would need to use the call light for our assistance. \"Yea but I didn't want to bother you all.\" Notifed Lindsay VILLAREAL NP received an ok to replace. NGT replaced by  charge RN at 0230. KUB obtained at 0253. Rcvd an order for an ok to use at 0330 by NP. NGT connected back to LIWS with tubing secured to shirt via safety pin. Increased frequency of rounding noted pt to appear anxious, getting out of the bed multiple times, taking off oxygen and disabling iv pump. Reached out to NP for prn dose to help with patient anxiety. 0.5mg ativan administered.     About 0600 pts ngt noted to be lying on the bedside table. Pt stated she sneezed and It just came out. Notified NP and Charge RN . NP will notify day team.  Also IV in LFA noted to be infiltrated. Attempted to place new iv however unsuccessful dt hard stick pt may benefit from ultrasound guided IV placement.   "

## 2025-06-21 NOTE — CARE PLAN
The patient's goals for the shift include  be pain free    The clinical goals for the shift include Patient will be cooperative with care/ NG replaced      Problem: Pain - Adult  Goal: Verbalizes/displays adequate comfort level or baseline comfort level  Outcome: Progressing     Problem: Pain  Goal: Takes deep breaths with improved pain control throughout the shift  Outcome: Progressing  Goal: Turns in bed with improved pain control throughout the shift  Outcome: Progressing  Goal: Walks with improved pain control throughout the shift  Outcome: Progressing  Goal: Performs ADL's with improved pain control throughout shift  Outcome: Progressing  Goal: Participates in PT with improved pain control throughout the shift  Outcome: Progressing  Goal: Free from opioid side effects throughout the shift  Outcome: Progressing  Goal: Free from acute confusion related to pain meds throughout the shift  Outcome: Progressing     Problem: Nutrition  Goal: Nutrient intake appropriate for maintaining nutritional needs  Outcome: Progressing

## 2025-06-22 ENCOUNTER — APPOINTMENT (OUTPATIENT)
Dept: GASTROENTEROLOGY | Facility: HOSPITAL | Age: 65
DRG: 330 | End: 2025-06-22
Payer: MEDICARE

## 2025-06-22 VITALS
RESPIRATION RATE: 16 BRPM | SYSTOLIC BLOOD PRESSURE: 100 MMHG | DIASTOLIC BLOOD PRESSURE: 61 MMHG | HEART RATE: 72 BPM | OXYGEN SATURATION: 92 % | BODY MASS INDEX: 21.45 KG/M2 | HEIGHT: 67 IN | WEIGHT: 136.69 LBS | TEMPERATURE: 98 F

## 2025-06-22 LAB
ANION GAP SERPL CALC-SCNC: 14 MMOL/L (ref 10–20)
BUN SERPL-MCNC: 17 MG/DL (ref 6–23)
CALCIUM SERPL-MCNC: 8.4 MG/DL (ref 8.6–10.3)
CHLORIDE SERPL-SCNC: 96 MMOL/L (ref 98–107)
CO2 SERPL-SCNC: 28 MMOL/L (ref 21–32)
CREAT SERPL-MCNC: 0.72 MG/DL (ref 0.5–1.05)
EGFRCR SERPLBLD CKD-EPI 2021: >90 ML/MIN/1.73M*2
ERYTHROCYTE [DISTWIDTH] IN BLOOD BY AUTOMATED COUNT: 15.9 % (ref 11.5–14.5)
GLUCOSE SERPL-MCNC: 91 MG/DL (ref 74–99)
HCT VFR BLD AUTO: 28.7 % (ref 36–46)
HGB BLD-MCNC: 9.9 G/DL (ref 12–16)
LACTATE SERPL-SCNC: 1 MMOL/L (ref 0.4–2)
MCH RBC QN AUTO: 30.2 PG (ref 26–34)
MCHC RBC AUTO-ENTMCNC: 34.5 G/DL (ref 32–36)
MCV RBC AUTO: 88 FL (ref 80–100)
NRBC BLD-RTO: 0 /100 WBCS (ref 0–0)
PLATELET # BLD AUTO: 386 X10*3/UL (ref 150–450)
POTASSIUM SERPL-SCNC: 4 MMOL/L (ref 3.5–5.3)
RBC # BLD AUTO: 3.28 X10*6/UL (ref 4–5.2)
SODIUM SERPL-SCNC: 134 MMOL/L (ref 136–145)
WBC # BLD AUTO: 10.1 X10*3/UL (ref 4.4–11.3)

## 2025-06-22 PROCEDURE — 3700000013 HC SEDATION LEVEL 5+ TIME - EACH ADDITIONAL 15 MINUTES

## 2025-06-22 PROCEDURE — 2500000004 HC RX 250 GENERAL PHARMACY W/ HCPCS (ALT 636 FOR OP/ED): Mod: JW | Performed by: ANESTHESIOLOGY

## 2025-06-22 PROCEDURE — 45330 DIAGNOSTIC SIGMOIDOSCOPY: CPT

## 2025-06-22 PROCEDURE — 2500000001 HC RX 250 WO HCPCS SELF ADMINISTERED DRUGS (ALT 637 FOR MEDICARE OP): Performed by: STUDENT IN AN ORGANIZED HEALTH CARE EDUCATION/TRAINING PROGRAM

## 2025-06-22 PROCEDURE — 2500000002 HC RX 250 W HCPCS SELF ADMINISTERED DRUGS (ALT 637 FOR MEDICARE OP, ALT 636 FOR OP/ED): Performed by: STUDENT IN AN ORGANIZED HEALTH CARE EDUCATION/TRAINING PROGRAM

## 2025-06-22 PROCEDURE — 2500000004 HC RX 250 GENERAL PHARMACY W/ HCPCS (ALT 636 FOR OP/ED): Mod: JZ

## 2025-06-22 PROCEDURE — 2500000001 HC RX 250 WO HCPCS SELF ADMINISTERED DRUGS (ALT 637 FOR MEDICARE OP)

## 2025-06-22 PROCEDURE — 99024 POSTOP FOLLOW-UP VISIT: CPT | Performed by: SURGERY

## 2025-06-22 PROCEDURE — 3700000012 HC SEDATION LEVEL 5+ TIME - INITIAL 15 MINUTES 5/> YEARS

## 2025-06-22 PROCEDURE — 2500000004 HC RX 250 GENERAL PHARMACY W/ HCPCS (ALT 636 FOR OP/ED): Performed by: SURGERY

## 2025-06-22 PROCEDURE — 83605 ASSAY OF LACTIC ACID: CPT | Performed by: COLON & RECTAL SURGERY

## 2025-06-22 PROCEDURE — 80048 BASIC METABOLIC PNL TOTAL CA: CPT

## 2025-06-22 PROCEDURE — 2500000004 HC RX 250 GENERAL PHARMACY W/ HCPCS (ALT 636 FOR OP/ED): Performed by: STUDENT IN AN ORGANIZED HEALTH CARE EDUCATION/TRAINING PROGRAM

## 2025-06-22 PROCEDURE — 2500000004 HC RX 250 GENERAL PHARMACY W/ HCPCS (ALT 636 FOR OP/ED): Performed by: COLON & RECTAL SURGERY

## 2025-06-22 PROCEDURE — 7100000009 HC PHASE TWO TIME - INITIAL BASE CHARGE

## 2025-06-22 PROCEDURE — 36415 COLL VENOUS BLD VENIPUNCTURE: CPT | Performed by: COLON & RECTAL SURGERY

## 2025-06-22 PROCEDURE — 85027 COMPLETE CBC AUTOMATED: CPT

## 2025-06-22 PROCEDURE — 0DJD8ZZ INSPECTION OF LOWER INTESTINAL TRACT, VIA NATURAL OR ARTIFICIAL OPENING ENDOSCOPIC: ICD-10-PCS | Performed by: SURGERY

## 2025-06-22 PROCEDURE — 7100000010 HC PHASE TWO TIME - EACH INCREMENTAL 1 MINUTE

## 2025-06-22 PROCEDURE — 2060000001 HC INTERMEDIATE ICU ROOM DAILY

## 2025-06-22 PROCEDURE — 2500000005 HC RX 250 GENERAL PHARMACY W/O HCPCS

## 2025-06-22 RX ORDER — FENTANYL CITRATE 50 UG/ML
INJECTION, SOLUTION INTRAMUSCULAR; INTRAVENOUS AS NEEDED
Status: DISCONTINUED | OUTPATIENT
Start: 2025-06-22 | End: 2025-06-22 | Stop reason: HOSPADM

## 2025-06-22 RX ORDER — MIDAZOLAM HYDROCHLORIDE 1 MG/ML
INJECTION, SOLUTION INTRAMUSCULAR; INTRAVENOUS AS NEEDED
Status: DISCONTINUED | OUTPATIENT
Start: 2025-06-22 | End: 2025-06-22 | Stop reason: HOSPADM

## 2025-06-22 RX ORDER — ATROPINE SULFATE 0.1 MG/ML
0.5 INJECTION INTRAVENOUS ONCE
Status: COMPLETED | OUTPATIENT
Start: 2025-06-22 | End: 2025-06-22

## 2025-06-22 RX ADMIN — GABAPENTIN 1800 MG: 300 CAPSULE ORAL at 21:07

## 2025-06-22 RX ADMIN — HYDROMORPHONE HYDROCHLORIDE 0.2 MG: 0.2 INJECTION, SOLUTION INTRAMUSCULAR; INTRAVENOUS; SUBCUTANEOUS at 07:49

## 2025-06-22 RX ADMIN — ATROPINE SULFATE 0.5 MG: 0.1 INJECTION INTRAVENOUS at 00:09

## 2025-06-22 RX ADMIN — ACETAMINOPHEN 650 MG: 325 TABLET, FILM COATED ORAL at 17:01

## 2025-06-22 RX ADMIN — GABAPENTIN 1200 MG: 400 CAPSULE ORAL at 08:00

## 2025-06-22 RX ADMIN — FENTANYL CITRATE 25 MCG: 50 INJECTION, SOLUTION INTRAMUSCULAR; INTRAVENOUS at 13:19

## 2025-06-22 RX ADMIN — MIDAZOLAM HYDROCHLORIDE 1 MG: 1 INJECTION, SOLUTION INTRAMUSCULAR; INTRAVENOUS at 13:19

## 2025-06-22 RX ADMIN — HYDROMORPHONE HYDROCHLORIDE 0.2 MG: 0.2 INJECTION, SOLUTION INTRAMUSCULAR; INTRAVENOUS; SUBCUTANEOUS at 22:58

## 2025-06-22 RX ADMIN — MIDAZOLAM HYDROCHLORIDE 2 MG: 1 INJECTION, SOLUTION INTRAMUSCULAR; INTRAVENOUS at 13:16

## 2025-06-22 RX ADMIN — OXYCODONE HYDROCHLORIDE 5 MG: 5 TABLET ORAL at 15:54

## 2025-06-22 RX ADMIN — MIDAZOLAM HYDROCHLORIDE 1 MG: 1 INJECTION, SOLUTION INTRAMUSCULAR; INTRAVENOUS at 13:32

## 2025-06-22 RX ADMIN — ACETAMINOPHEN 650 MG: 325 TABLET, FILM COATED ORAL at 23:15

## 2025-06-22 RX ADMIN — POTASSIUM CHLORIDE, DEXTROSE MONOHYDRATE AND SODIUM CHLORIDE 75 ML/HR: 150; 5; 900 INJECTION, SOLUTION INTRAVENOUS at 02:51

## 2025-06-22 RX ADMIN — HYDROMORPHONE HYDROCHLORIDE 0.2 MG: 0.2 INJECTION, SOLUTION INTRAMUSCULAR; INTRAVENOUS; SUBCUTANEOUS at 16:30

## 2025-06-22 RX ADMIN — LIDOCAINE 4% 1 PATCH: 40 PATCH TOPICAL at 08:00

## 2025-06-22 RX ADMIN — FENTANYL CITRATE 50 MCG: 50 INJECTION, SOLUTION INTRAMUSCULAR; INTRAVENOUS at 13:24

## 2025-06-22 RX ADMIN — HYDROMORPHONE HYDROCHLORIDE 0.2 MG: 0.2 INJECTION, SOLUTION INTRAMUSCULAR; INTRAVENOUS; SUBCUTANEOUS at 02:51

## 2025-06-22 RX ADMIN — OXYCODONE HYDROCHLORIDE 5 MG: 5 TABLET ORAL at 21:21

## 2025-06-22 RX ADMIN — FENTANYL CITRATE 50 MCG: 50 INJECTION, SOLUTION INTRAMUSCULAR; INTRAVENOUS at 13:15

## 2025-06-22 RX ADMIN — ACETAMINOPHEN 650 MG: 325 TABLET, FILM COATED ORAL at 06:10

## 2025-06-22 RX ADMIN — SCOPOLAMINE 1 PATCH: 1.5 PATCH, EXTENDED RELEASE TRANSDERMAL at 11:51

## 2025-06-22 RX ADMIN — LEVOTHYROXINE SODIUM 100 MCG: 0.1 TABLET ORAL at 06:10

## 2025-06-22 RX ADMIN — ACETAMINOPHEN 650 MG: 325 TABLET, FILM COATED ORAL at 01:15

## 2025-06-22 RX ADMIN — ENOXAPARIN SODIUM 40 MG: 40 INJECTION SUBCUTANEOUS at 08:00

## 2025-06-22 RX ADMIN — ACETAMINOPHEN 650 MG: 325 TABLET, FILM COATED ORAL at 11:52

## 2025-06-22 RX ADMIN — OXYCODONE HYDROCHLORIDE 5 MG: 5 TABLET ORAL at 09:56

## 2025-06-22 RX ADMIN — HYDROMORPHONE HYDROCHLORIDE 0.2 MG: 0.2 INJECTION, SOLUTION INTRAMUSCULAR; INTRAVENOUS; SUBCUTANEOUS at 11:52

## 2025-06-22 SDOH — SOCIAL STABILITY: SOCIAL INSECURITY
WITHIN THE LAST YEAR, HAVE YOU BEEN RAPED OR FORCED TO HAVE ANY KIND OF SEXUAL ACTIVITY BY YOUR PARTNER OR EX-PARTNER?: NO

## 2025-06-22 SDOH — SOCIAL STABILITY: SOCIAL INSECURITY: ARE YOU OR HAVE YOU BEEN THREATENED OR ABUSED PHYSICALLY, EMOTIONALLY, OR SEXUALLY BY ANYONE?: NO

## 2025-06-22 SDOH — SOCIAL STABILITY: SOCIAL INSECURITY
WITHIN THE LAST YEAR, HAVE YOU BEEN KICKED, HIT, SLAPPED, OR OTHERWISE PHYSICALLY HURT BY YOUR PARTNER OR EX-PARTNER?: NO

## 2025-06-22 SDOH — SOCIAL STABILITY: SOCIAL INSECURITY: DO YOU FEEL ANYONE HAS EXPLOITED OR TAKEN ADVANTAGE OF YOU FINANCIALLY OR OF YOUR PERSONAL PROPERTY?: NO

## 2025-06-22 SDOH — ECONOMIC STABILITY: INCOME INSECURITY: IN THE PAST 12 MONTHS HAS THE ELECTRIC, GAS, OIL, OR WATER COMPANY THREATENED TO SHUT OFF SERVICES IN YOUR HOME?: NO

## 2025-06-22 SDOH — SOCIAL STABILITY: SOCIAL INSECURITY: WERE YOU ABLE TO COMPLETE ALL THE BEHAVIORAL HEALTH SCREENINGS?: YES

## 2025-06-22 SDOH — ECONOMIC STABILITY: FOOD INSECURITY: WITHIN THE PAST 12 MONTHS, THE FOOD YOU BOUGHT JUST DIDN'T LAST AND YOU DIDN'T HAVE MONEY TO GET MORE.: NEVER TRUE

## 2025-06-22 SDOH — SOCIAL STABILITY: SOCIAL INSECURITY: DO YOU FEEL UNSAFE GOING BACK TO THE PLACE WHERE YOU ARE LIVING?: NO

## 2025-06-22 SDOH — ECONOMIC STABILITY: FOOD INSECURITY: WITHIN THE PAST 12 MONTHS, YOU WORRIED THAT YOUR FOOD WOULD RUN OUT BEFORE YOU GOT THE MONEY TO BUY MORE.: NEVER TRUE

## 2025-06-22 SDOH — SOCIAL STABILITY: SOCIAL INSECURITY: WITHIN THE LAST YEAR, HAVE YOU BEEN HUMILIATED OR EMOTIONALLY ABUSED IN OTHER WAYS BY YOUR PARTNER OR EX-PARTNER?: NO

## 2025-06-22 SDOH — SOCIAL STABILITY: SOCIAL INSECURITY: WITHIN THE LAST YEAR, HAVE YOU BEEN AFRAID OF YOUR PARTNER OR EX-PARTNER?: NO

## 2025-06-22 SDOH — SOCIAL STABILITY: SOCIAL INSECURITY: ABUSE: ADULT

## 2025-06-22 SDOH — SOCIAL STABILITY: SOCIAL INSECURITY: HAS ANYONE EVER THREATENED TO HURT YOUR FAMILY OR YOUR PETS?: NO

## 2025-06-22 SDOH — SOCIAL STABILITY: SOCIAL INSECURITY: HAVE YOU HAD ANY THOUGHTS OF HARMING ANYONE ELSE?: NO

## 2025-06-22 SDOH — SOCIAL STABILITY: SOCIAL INSECURITY: DOES ANYONE TRY TO KEEP YOU FROM HAVING/CONTACTING OTHER FRIENDS OR DOING THINGS OUTSIDE YOUR HOME?: NO

## 2025-06-22 SDOH — SOCIAL STABILITY: SOCIAL INSECURITY: HAVE YOU HAD THOUGHTS OF HARMING ANYONE ELSE?: NO

## 2025-06-22 SDOH — SOCIAL STABILITY: SOCIAL INSECURITY: ARE THERE ANY APPARENT SIGNS OF INJURIES/BEHAVIORS THAT COULD BE RELATED TO ABUSE/NEGLECT?: NO

## 2025-06-22 ASSESSMENT — LIFESTYLE VARIABLES
AUDIT-C TOTAL SCORE: 1
AUDIT-C TOTAL SCORE: 1
HOW OFTEN DO YOU HAVE A DRINK CONTAINING ALCOHOL: MONTHLY OR LESS
HOW MANY STANDARD DRINKS CONTAINING ALCOHOL DO YOU HAVE ON A TYPICAL DAY: 1 OR 2
HOW OFTEN DO YOU HAVE 6 OR MORE DRINKS ON ONE OCCASION: NEVER
SKIP TO QUESTIONS 9-10: 1

## 2025-06-22 ASSESSMENT — COGNITIVE AND FUNCTIONAL STATUS - GENERAL
DAILY ACTIVITIY SCORE: 24
DAILY ACTIVITIY SCORE: 24
PATIENT BASELINE BEDBOUND: NO
MOBILITY SCORE: 24
MOBILITY SCORE: 24

## 2025-06-22 ASSESSMENT — ACTIVITIES OF DAILY LIVING (ADL)
PATIENT'S MEMORY ADEQUATE TO SAFELY COMPLETE DAILY ACTIVITIES?: YES
HEARING - RIGHT EAR: FUNCTIONAL
WALKS IN HOME: INDEPENDENT
ADEQUATE_TO_COMPLETE_ADL: YES
ASSISTIVE_DEVICE: DENTURES UPPER
DRESSING YOURSELF: INDEPENDENT
FEEDING YOURSELF: INDEPENDENT
BATHING: INDEPENDENT
TOILETING: INDEPENDENT
HEARING - LEFT EAR: FUNCTIONAL
JUDGMENT_ADEQUATE_SAFELY_COMPLETE_DAILY_ACTIVITIES: YES
GROOMING: INDEPENDENT

## 2025-06-22 ASSESSMENT — PAIN SCALES - GENERAL
PAINLEVEL_OUTOF10: 9
PAINLEVEL_OUTOF10: 5 - MODERATE PAIN
PAINLEVEL_OUTOF10: 9
PAINLEVEL_OUTOF10: 7
PAINLEVEL_OUTOF10: 9
PAINLEVEL_OUTOF10: 3
PAINLEVEL_OUTOF10: 3
PAINLEVEL_OUTOF10: 10 - WORST POSSIBLE PAIN
PAINLEVEL_OUTOF10: 9
PAINLEVEL_OUTOF10: 3
PAINLEVEL_OUTOF10: 10 - WORST POSSIBLE PAIN
PAINLEVEL_OUTOF10: 7
PAINLEVEL_OUTOF10: 6
PAINLEVEL_OUTOF10: 9
PAINLEVEL_OUTOF10: 10 - WORST POSSIBLE PAIN
PAINLEVEL_OUTOF10: 5 - MODERATE PAIN
PAINLEVEL_OUTOF10: 5 - MODERATE PAIN
PAINLEVEL_OUTOF10: 7
PAINLEVEL_OUTOF10: 6
PAINLEVEL_OUTOF10: 10 - WORST POSSIBLE PAIN
PAINLEVEL_OUTOF10: 0 - NO PAIN
PAINLEVEL_OUTOF10: 8
PAINLEVEL_OUTOF10: 7
PAINLEVEL_OUTOF10: 9

## 2025-06-22 ASSESSMENT — PAIN DESCRIPTION - DESCRIPTORS: DESCRIPTORS: ACHING;DULL;DISCOMFORT;SHARP

## 2025-06-22 ASSESSMENT — PAIN DESCRIPTION - LOCATION
LOCATION: ABDOMEN

## 2025-06-22 ASSESSMENT — PAIN DESCRIPTION - ORIENTATION
ORIENTATION: MID

## 2025-06-22 ASSESSMENT — PATIENT HEALTH QUESTIONNAIRE - PHQ9
1. LITTLE INTEREST OR PLEASURE IN DOING THINGS: NOT AT ALL
SUM OF ALL RESPONSES TO PHQ9 QUESTIONS 1 & 2: 0
2. FEELING DOWN, DEPRESSED OR HOPELESS: NOT AT ALL

## 2025-06-22 NOTE — CARE PLAN
"   The patient's goals for the shift include \"get the meds to work\" so I can be free of pain    The clinical goals for the shift include pt reported pain <5/10 by end of shift        Problem: Pain - Adult  Goal: Verbalizes/displays adequate comfort level or baseline comfort level  Outcome: Progressing     "

## 2025-06-22 NOTE — CARE PLAN
"The patient's goals for the shift include \"get the meds to work\"    The clinical goals for the shift include pt reported pain <5/10 by end of shift    "

## 2025-06-22 NOTE — CONSULTS
Critical Care Medicine Note    Critical care medicine consulted for administration of high risk med, neostigmine.    Patient is a 64 yo F with PMH of neuropathy, tobacco use disorder, hypothyroid, large bowel obstruction secondary to stricture at the splenic flexure s/p ex lap and transverse colostomy (7/2024) admitted here postop on 6/16 following large bowel resection and colostomy takedown.  Her postop course has been complicated by ileus.  CT abd/pelv done today notable for marked gaseous distention of the colon with moderate amount of stool, no evidence of bowel obstruction.  Findings c/w colonic pseudo-obstruction.  ICU consulted to administer neostigmine.    Reviewed past medical and surgical history, meds, allergies, social and family history.  Reviewed vitals, labs, and imaging studies.    No specific complaints except for prolonged constipation with abdominal distention and pain.  No headache or visual disturbances, headache, chest pain or pressure, SOB, nausea or vomiting.  Exam:  AAOx3, normal heart sounds, regular rate and rhythm, breath sounds clear bilterally, abd markedly distended and taut, hypoactive distant tinkling bowel sounds, diffusely tender but no guarding or rebound, no peripheral edema.    Discussed R&Bs of neostigmine administration.  Patient was agreeable.    Assessment and Plan    #Colonic pseudo-obstruction    - Administering neostigmine under close monitoring.      Andrew Lancaster MD    Patient care time 60 min.

## 2025-06-22 NOTE — PROGRESS NOTES
"Critical Care Medicine Progress Note    Administered neostigmine 2 mg IV at 2130.  Patient said she felt like \"things were starting to move\" in her abdomen, but she sat on the toilet with no result.  She did not endorse any side effects.  HR went into the low 60s.    Administered neostigmine 3 mg IV at ~midnight.  Patient became nauseated within 1 to 2 minutes.  Began spitting up but didn't vomit.  Also noticeable salivation and lacrimation.  HR dropped into the 30s.  Atropine 0.5 mg IV was administered promptly and HR came up into the 80s.  At her request, RN assisted her to the bathroom and she sat on the toilet.  Still no result though.    Assessment and Plan    #Colonic pseudo-obstruction    - Treatment with neostigmine unsuccessful.  - Cholinergic surge after neostigmine 3 mg required treatment with atropine.      Andrew Lancaster MD  "

## 2025-06-22 NOTE — PROGRESS NOTES
"Barbara Sow is a 65 y.o. female on day 6 of admission presenting with Large bowel obstruction (Multi).    Subjective   Persistent distention despite 2 rounds of neostigmine.  Small liquid bowel movement likely rectal contrast.       Objective     Physical Exam  Constitutional:       Appearance: Normal appearance.   HENT:      Head: Normocephalic.   Cardiovascular:      Rate and Rhythm: Normal rate and regular rhythm.      Pulses: Normal pulses.   Pulmonary:      Effort: Pulmonary effort is normal.   Abdominal:      General: There is distension.   Musculoskeletal:         General: Normal range of motion.   Skin:     General: Skin is warm.   Neurological:      General: No focal deficit present.      Mental Status: She is alert.   Psychiatric:         Mood and Affect: Mood normal.         Behavior: Behavior normal.         Last Recorded Vitals  Blood pressure 116/72, pulse 91, temperature 37.3 °C (99.1 °F), temperature source Skin, resp. rate 16, height 1.702 m (5' 7\"), weight 62 kg (136 lb 11 oz), SpO2 93%.  Intake/Output last 3 Shifts:  I/O last 3 completed shifts:  In: 836.3 (13.5 mL/kg) [P.O.:420; IV Piggyback:416.3]  Out: 650 (10.5 mL/kg) [Urine:650 (0.3 mL/kg/hr)]  Weight: 62 kg     Relevant Results                              Assessment & Plan  Large bowel obstruction (Multi)    Postoperative colonic ileus, electrolytes within normal limits, did not respond to neostigmine x 2  Plan for decompressive colonoscopy today      I spent 25 minutes in the professional and overall care of this patient.      Mehrdad Garner MD    "

## 2025-06-23 ENCOUNTER — APPOINTMENT (OUTPATIENT)
Dept: RADIOLOGY | Facility: HOSPITAL | Age: 65
DRG: 330 | End: 2025-06-23
Payer: MEDICARE

## 2025-06-23 LAB
AMMONIA PLAS-SCNC: 36 UMOL/L (ref 16–53)
ANION GAP SERPL CALC-SCNC: 17 MMOL/L (ref 10–20)
BUN SERPL-MCNC: 21 MG/DL (ref 6–23)
CALCIUM SERPL-MCNC: 8.2 MG/DL (ref 8.6–10.3)
CHLORIDE SERPL-SCNC: 95 MMOL/L (ref 98–107)
CO2 SERPL-SCNC: 24 MMOL/L (ref 21–32)
CREAT SERPL-MCNC: 0.8 MG/DL (ref 0.5–1.05)
EGFRCR SERPLBLD CKD-EPI 2021: 81 ML/MIN/1.73M*2
ERYTHROCYTE [DISTWIDTH] IN BLOOD BY AUTOMATED COUNT: 16.2 % (ref 11.5–14.5)
GLUCOSE SERPL-MCNC: 100 MG/DL (ref 74–99)
HCT VFR BLD AUTO: 27.8 % (ref 36–46)
HGB BLD-MCNC: 8.7 G/DL (ref 12–16)
MAGNESIUM SERPL-MCNC: 3.01 MG/DL (ref 1.6–2.4)
MCH RBC QN AUTO: 30 PG (ref 26–34)
MCHC RBC AUTO-ENTMCNC: 31.3 G/DL (ref 32–36)
MCV RBC AUTO: 96 FL (ref 80–100)
NRBC BLD-RTO: 0 /100 WBCS (ref 0–0)
PLATELET # BLD AUTO: 424 X10*3/UL (ref 150–450)
POTASSIUM SERPL-SCNC: 4.2 MMOL/L (ref 3.5–5.3)
RBC # BLD AUTO: 2.9 X10*6/UL (ref 4–5.2)
SODIUM SERPL-SCNC: 132 MMOL/L (ref 136–145)
WBC # BLD AUTO: 8.7 X10*3/UL (ref 4.4–11.3)

## 2025-06-23 PROCEDURE — 99233 SBSQ HOSP IP/OBS HIGH 50: CPT

## 2025-06-23 PROCEDURE — 2500000001 HC RX 250 WO HCPCS SELF ADMINISTERED DRUGS (ALT 637 FOR MEDICARE OP): Performed by: NURSE PRACTITIONER

## 2025-06-23 PROCEDURE — 2500000004 HC RX 250 GENERAL PHARMACY W/ HCPCS (ALT 636 FOR OP/ED)

## 2025-06-23 PROCEDURE — 1100000001 HC PRIVATE ROOM DAILY

## 2025-06-23 PROCEDURE — 74018 RADEX ABDOMEN 1 VIEW: CPT

## 2025-06-23 PROCEDURE — 85027 COMPLETE CBC AUTOMATED: CPT | Performed by: NURSE PRACTITIONER

## 2025-06-23 PROCEDURE — 36415 COLL VENOUS BLD VENIPUNCTURE: CPT | Performed by: NURSE PRACTITIONER

## 2025-06-23 PROCEDURE — 80048 BASIC METABOLIC PNL TOTAL CA: CPT | Performed by: NURSE PRACTITIONER

## 2025-06-23 PROCEDURE — 2500000001 HC RX 250 WO HCPCS SELF ADMINISTERED DRUGS (ALT 637 FOR MEDICARE OP): Performed by: STUDENT IN AN ORGANIZED HEALTH CARE EDUCATION/TRAINING PROGRAM

## 2025-06-23 PROCEDURE — 2500000002 HC RX 250 W HCPCS SELF ADMINISTERED DRUGS (ALT 637 FOR MEDICARE OP, ALT 636 FOR OP/ED): Performed by: STUDENT IN AN ORGANIZED HEALTH CARE EDUCATION/TRAINING PROGRAM

## 2025-06-23 PROCEDURE — 2500000001 HC RX 250 WO HCPCS SELF ADMINISTERED DRUGS (ALT 637 FOR MEDICARE OP)

## 2025-06-23 PROCEDURE — 83735 ASSAY OF MAGNESIUM: CPT | Performed by: NURSE PRACTITIONER

## 2025-06-23 PROCEDURE — 82140 ASSAY OF AMMONIA: CPT | Performed by: NURSE PRACTITIONER

## 2025-06-23 PROCEDURE — 2500000005 HC RX 250 GENERAL PHARMACY W/O HCPCS

## 2025-06-23 PROCEDURE — 2500000004 HC RX 250 GENERAL PHARMACY W/ HCPCS (ALT 636 FOR OP/ED): Performed by: STUDENT IN AN ORGANIZED HEALTH CARE EDUCATION/TRAINING PROGRAM

## 2025-06-23 PROCEDURE — 74018 RADEX ABDOMEN 1 VIEW: CPT | Performed by: RADIOLOGY

## 2025-06-23 RX ORDER — SODIUM CHLORIDE 9 MG/ML
75 INJECTION, SOLUTION INTRAVENOUS CONTINUOUS
Status: ACTIVE | OUTPATIENT
Start: 2025-06-23 | End: 2025-06-24

## 2025-06-23 RX ORDER — ZINC OXIDE 20 G/100G
1 OINTMENT TOPICAL
Status: DISCONTINUED | OUTPATIENT
Start: 2025-06-23 | End: 2025-06-25 | Stop reason: HOSPADM

## 2025-06-23 RX ORDER — LORAZEPAM 1 MG/1
1 TABLET ORAL ONCE
Status: COMPLETED | OUTPATIENT
Start: 2025-06-23 | End: 2025-06-23

## 2025-06-23 RX ORDER — POLYETHYLENE GLYCOL 3350, SODIUM CHLORIDE, SODIUM BICARBONATE, POTASSIUM CHLORIDE 420; 11.2; 5.72; 1.48 G/4L; G/4L; G/4L; G/4L
4000 POWDER, FOR SOLUTION ORAL ONCE
Status: COMPLETED | OUTPATIENT
Start: 2025-06-23 | End: 2025-06-23

## 2025-06-23 RX ADMIN — GABAPENTIN 1800 MG: 300 CAPSULE ORAL at 20:15

## 2025-06-23 RX ADMIN — SODIUM CHLORIDE 75 ML/HR: 0.9 INJECTION, SOLUTION INTRAVENOUS at 22:23

## 2025-06-23 RX ADMIN — ACETAMINOPHEN 650 MG: 325 TABLET, FILM COATED ORAL at 05:10

## 2025-06-23 RX ADMIN — POLYETHYLENE GLYCOL 3350, SODIUM SULFATE ANHYDROUS, SODIUM BICARBONATE, SODIUM CHLORIDE, POTASSIUM CHLORIDE 4000 ML: 236; 22.74; 6.74; 5.86; 2.97 POWDER, FOR SOLUTION ORAL at 10:15

## 2025-06-23 RX ADMIN — OXYCODONE HYDROCHLORIDE 5 MG: 5 TABLET ORAL at 16:44

## 2025-06-23 RX ADMIN — ACETAMINOPHEN 650 MG: 325 TABLET, FILM COATED ORAL at 12:14

## 2025-06-23 RX ADMIN — ENOXAPARIN SODIUM 40 MG: 40 INJECTION SUBCUTANEOUS at 09:14

## 2025-06-23 RX ADMIN — HYDROMORPHONE HYDROCHLORIDE 0.2 MG: 0.2 INJECTION, SOLUTION INTRAMUSCULAR; INTRAVENOUS; SUBCUTANEOUS at 12:18

## 2025-06-23 RX ADMIN — GABAPENTIN 1200 MG: 400 CAPSULE ORAL at 09:14

## 2025-06-23 RX ADMIN — SODIUM CHLORIDE 75 ML/HR: 0.9 INJECTION, SOLUTION INTRAVENOUS at 09:14

## 2025-06-23 RX ADMIN — OXYCODONE HYDROCHLORIDE 5 MG: 5 TABLET ORAL at 10:28

## 2025-06-23 RX ADMIN — LIDOCAINE 4% 1 PATCH: 40 PATCH TOPICAL at 09:14

## 2025-06-23 RX ADMIN — LEVOTHYROXINE SODIUM 100 MCG: 0.1 TABLET ORAL at 05:10

## 2025-06-23 RX ADMIN — OXYCODONE HYDROCHLORIDE 5 MG: 5 TABLET ORAL at 22:21

## 2025-06-23 RX ADMIN — ACETAMINOPHEN 650 MG: 325 TABLET, FILM COATED ORAL at 19:05

## 2025-06-23 RX ADMIN — HYDROMORPHONE HYDROCHLORIDE 0.2 MG: 0.2 INJECTION, SOLUTION INTRAMUSCULAR; INTRAVENOUS; SUBCUTANEOUS at 02:59

## 2025-06-23 RX ADMIN — LORAZEPAM 1 MG: 1 TABLET ORAL at 04:52

## 2025-06-23 ASSESSMENT — COGNITIVE AND FUNCTIONAL STATUS - GENERAL
MOBILITY SCORE: 24
DAILY ACTIVITIY SCORE: 24

## 2025-06-23 ASSESSMENT — PAIN DESCRIPTION - LOCATION
LOCATION: ABDOMEN

## 2025-06-23 ASSESSMENT — PAIN - FUNCTIONAL ASSESSMENT
PAIN_FUNCTIONAL_ASSESSMENT: 0-10

## 2025-06-23 ASSESSMENT — PAIN SCALES - GENERAL
PAINLEVEL_OUTOF10: 10 - WORST POSSIBLE PAIN
PAINLEVEL_OUTOF10: 7
PAINLEVEL_OUTOF10: 6
PAINLEVEL_OUTOF10: 8
PAINLEVEL_OUTOF10: 8
PAINLEVEL_OUTOF10: 5 - MODERATE PAIN
PAINLEVEL_OUTOF10: 8
PAINLEVEL_OUTOF10: 7
PAINLEVEL_OUTOF10: 8
PAINLEVEL_OUTOF10: 0 - NO PAIN
PAINLEVEL_OUTOF10: 4

## 2025-06-23 ASSESSMENT — PAIN DESCRIPTION - ORIENTATION
ORIENTATION: LEFT
ORIENTATION: MID
ORIENTATION: MID

## 2025-06-23 ASSESSMENT — PAIN DESCRIPTION - DESCRIPTORS
DESCRIPTORS: ACHING;CRAMPING
DESCRIPTORS: ACHING;CRAMPING;TIGHTNESS
DESCRIPTORS: SHARP;THROBBING
DESCRIPTORS: CRAMPING

## 2025-06-23 NOTE — PROGRESS NOTES
06/23/25 1000   Discharge Planning   Expected Discharge Disposition Home   Does the patient need discharge transport arranged? Yes   RoundTrip coordination needed? Yes   Has discharge transport been arranged? No       Patient came in with large bowel obstruction. She had colon resection and colostomy closer. Patient's abdomen is still hard and distended. She is going for decompressing colostomy today. When patient is medically cleared will go home with no needs identified.

## 2025-06-23 NOTE — NURSING NOTE
Patient is seen pacing about room, dressed and off telemetry.  Sits on bed, vaping, continues to pace about the room.  In and out of bathroom with vape pen in hand.

## 2025-06-23 NOTE — NURSING NOTE
"1218, Patient complained of severe pain, dilaudid given. At reassessment of pain after IV dilaudid given patient appears to be comfortable however unaware of her surroundings.   Showing signs of AMS.  Laying in bed, states \"what is on that shelf\" had to re-orient patient to her room.       Will continue to monitor, discuss alternative pain options that patient maybe able to tolerate better.  "

## 2025-06-23 NOTE — NURSING NOTE
At around 0300, pt got very confused with situation and location. When trying to use the bathroom, pt attempted to sit on plastic chair in room and urinate on it with pants pulled down. Pt also went to the bathroom 10+ times in 30 minutes, between getting up to move her foot rest around the room. Bladder scan showed 0 ml with lots of pressure on the probe.     Pt given one time dose of ativan and seems to have gotten at least some last minute rest.

## 2025-06-23 NOTE — NURSING NOTE
Updated patient on new orders of her care.  Patient ambulated to hydration station to get popsicle.  Patient also took chocolate pudding and applesauce, advised patient that those items are to be held for now per her doctors orders.

## 2025-06-23 NOTE — CARE PLAN
"The patient's goals for the shift include \"get the meds to work\"    The clinical goals for the shift include Patient will be HDS throughout my shift    Over the shift, the patient did not make progress toward the following goals. Barriers to progression include . Recommendations to address these barriers include .    Problem: Pain - Adult  Goal: Verbalizes/displays adequate comfort level or baseline comfort level  Outcome: Progressing     Problem: Safety - Adult  Goal: Free from fall injury  Outcome: Progressing     "

## 2025-06-23 NOTE — NURSING NOTE
"Patient ambulates to my workstation independently, states \"you need to come hook this stuff back up\".  She is disconnected from her IV infusion.       I assist her back to her room, clean her IV port with alcohol and reconnect her infusion.    Patient states \"I'm getting sick of all this\".    Reminded patient that treatment ordered is to help with obstruction, and while may be unpleasant, will hopefully result in relief soon.    Patient states \"give me my pain medication\"    Reminded patient again, she is due at 4:30pm.   Patient states \"it's 5:30pm!\"    Advised patient it is just after 4pm.  Patient continues to voice frustration with her care.  Elevating her tone and using profanity to emphasize her point.  " Cristal Ryder, CMA

## 2025-06-23 NOTE — PROGRESS NOTES
"Barbara Sow is a 65 y.o. female on day 7 of admission presenting with Large bowel obstruction (Multi).    Subjective   Patient is awake in the chair this morning. Very uncomfortable from abdominal bloating. She reports she has started to pass gas and had a liquid bowel movement    Addressed no vaping while in the hospital offered nicotine patch she refused.        Objective     Physical Exam  Constitutional:       Appearance: Normal appearance.   Cardiovascular:      Rate and Rhythm: Normal rate and regular rhythm.   Pulmonary:      Effort: Pulmonary effort is normal.      Comments: Non labored breathing on RA  Abdominal:      General: There is distension (firm).      Tenderness: There is abdominal tenderness.      Comments: Lap sites C/D/I, edges well approximated, covered in Dermabond. ABD binder on    Skin:     General: Skin is warm and dry.   Neurological:      General: No focal deficit present.      Mental Status: She is alert and oriented to person, place, and time. Mental status is at baseline.   Psychiatric:         Attention and Perception: Attention normal.         Mood and Affect: Mood normal.         Speech: Speech normal.         Behavior: Behavior normal.         Cognition and Memory: Cognition normal.         Last Recorded Vitals  Blood pressure 104/78, pulse 78, temperature 36.2 °C (97.2 °F), temperature source Temporal, resp. rate 18, height 1.702 m (5' 7\"), weight 62 kg (136 lb 11 oz), SpO2 97%.  Intake/Output last 3 Shifts:  I/O last 3 completed shifts:  In: 340 (5.5 mL/kg) [P.O.:340]  Out: 650 (10.5 mL/kg) [Urine:650 (0.3 mL/kg/hr)]  Weight: 62 kg     Relevant Results  Scheduled medications  Scheduled Medications[1]  Continuous medications  Continuous Medications[2]  PRN medications  PRN Medications[3]   Results for orders placed or performed during the hospital encounter of 06/16/25 (from the past 24 hours)   Basic Metabolic Panel   Result Value Ref Range    Glucose 100 (H) 74 - 99 mg/dL "    Sodium 132 (L) 136 - 145 mmol/L    Potassium 4.2 3.5 - 5.3 mmol/L    Chloride 95 (L) 98 - 107 mmol/L    Bicarbonate 24 21 - 32 mmol/L    Anion Gap 17 10 - 20 mmol/L    Urea Nitrogen 21 6 - 23 mg/dL    Creatinine 0.80 0.50 - 1.05 mg/dL    eGFR 81 >60 mL/min/1.73m*2    Calcium 8.2 (L) 8.6 - 10.3 mg/dL   CBC   Result Value Ref Range    WBC 8.7 4.4 - 11.3 x10*3/uL    nRBC 0.0 0.0 - 0.0 /100 WBCs    RBC 2.90 (L) 4.00 - 5.20 x10*6/uL    Hemoglobin 8.7 (L) 12.0 - 16.0 g/dL    Hematocrit 27.8 (L) 36.0 - 46.0 %    MCV 96 80 - 100 fL    MCH 30.0 26.0 - 34.0 pg    MCHC 31.3 (L) 32.0 - 36.0 g/dL    RDW 16.2 (H) 11.5 - 14.5 %    Platelets 424 150 - 450 x10*3/uL   Magnesium   Result Value Ref Range    Magnesium 3.01 (H) 1.60 - 2.40 mg/dL   Ammonia   Result Value Ref Range    Ammonia 36 16 - 53 umol/L      XR abdomen 1 view   Final Result   Markedly dilated air-filled right colon and distended left mid   abdominal bowel loops similar to 06/21/2025.        MACRO:   None.        Signed by: Talia Hager 6/23/2025 9:28 AM   Dictation workstation:   [id number]      CT abdomen pelvis wo IV contrast   Final Result      XR abdomen 2 views supine and erect or decub   Final Result   1. Mild progression of colonic distention. CT may be helpful to   assess for etiology.   2. Enteric tube is not evident and likely removed. Clinical   correlation is recommended.        Signed by: Shailesh Wyman 6/21/2025 11:05 AM   Dictation workstation:   [id number]      XR chest abdomen for OG NG placement   Final Result   NG tube as above.             MACRO:   None        Signed by: Tom Foss 6/21/2025 3:18 AM   Dictation workstation:   [id number]      XR chest abdomen for OG NG placement   Final Result   NG tube in place as described.        Mild atelectasis at the lung bases.        Cardiomegaly.        Nonspecific gaseous distention of large and small bowel loops in the   imaged upper abdomen.        MACRO:   None        Signed by: Todd  Mónica 6/20/2025 4:14 PM   Dictation workstation:   [id number]      XR abdomen 2 views supine and erect or decub   Final Result   Moderate stool in the cecum. Moderate gaseous distention of multiple   small and large bowel loops as described. Question diffuse ileus.   Distal colonic obstruction is not excluded.        No pneumoperitoneum.        MACRO:   None        Signed by: Todd Sales 6/19/2025 1:17 PM   Dictation workstation:   [id number]                              Assessment & Plan    Neuro: Acute post-op pain well controlled     Hx: anxiety, B/l sciatica, R cervical and lumbar radiculopathy, OA, DDD   - OOB ambulate as tolerated at least 5 times per day  - Continue current pain control regimen    Cardiovascular: /78 (BP Location: Left arm, Patient Position: Lying)   Pulse 78    - VS Q4h    Pulmonary: Non labored breathing on RA  - Encourage IS  - Encourage ambulation    Gastrointestinal: POD 7 Lx splenic flexure takedown ; Laparoscopic splenic flexure resection ; Colostomy closure ;   SAMI block Findings: Minimal adhesions, tattoo proximal and distal to flexure noted.  Colon resection with great bleeding at the colon edge and      Hx: LBO  - NPO sips with meds  - 2 rounds neostigmine 6/21 without any bowel movements. Bradycardia with 2nd round, atropine given  - Colonoscopy attempted for decompression 6/22  - CT scan ysterday with some stool past the anastomosis  - 1 cup of golytly every 2 hours  - PRN antiemetic    Genitourinary: Voiding independently  - continue mIVF  - Monitor I&Os   - Cr: 0.80  - Continue to trend electrolytes with daily labs    Hematology:   - H/H: 9.9/28.7 -> 8.7/27.8  - DVT ppx: SCDs/ Lovenox  - No active s/s of bleeding    Infectious Disease:   - WBC: 10.1 ->8.7  - Afebrile, VSS  - Continue to monitor for s/s of infection  - Continue to monitor WBC with daily CBC     Discussed with Dr. Valencia. Will try to sip on golytly throughout the day today. ARBF and bloating  improvement      I spent 50 minutes in the professional and overall care of this patient.      YOLIS Fleming-C    Pt now has butt burn from the amount of diarrhea she is having - stools are liquid and brown    Gen - well appearing, moving around without pain  Abd - still markedly distended, but more compressible and lower  Extr - no edema    Impression - Pt with colonic ileus s/p colostomy closure and resection of colonic stricture  Plan   Check C diff   Can start popsicles  Tx back to 7 th floor  OOB as much as possible  Avoid narcotics at all cost  Zinc oxide for perineum         [1] acetaminophen, 650 mg, oral, q6h  atropine, 1 mg, intravenous, Once  enoxaparin, 40 mg, subcutaneous, q24h  gabapentin, 1,200 mg, oral, q AM  gabapentin, 1,800 mg, oral, Nightly  levothyroxine, 100 mcg, oral, Daily  lidocaine, 1 patch, transdermal, Daily  polyethylene glycol-electrolytes, 4,000 mL, oral, Once  scopolamine, 1 patch, transdermal, q72h     [2] sodium chloride 0.9%, 75 mL/hr, Last Rate: 75 mL/hr (06/23/25 0914)     [3] PRN medications: HYDROmorphone, magnesium hydroxide, naloxone, ondansetron **OR** ondansetron, oxyCODONE

## 2025-06-24 LAB
ANION GAP SERPL CALC-SCNC: 16 MMOL/L (ref 10–20)
BUN SERPL-MCNC: 10 MG/DL (ref 6–23)
CALCIUM SERPL-MCNC: 7.6 MG/DL (ref 8.6–10.3)
CHLORIDE SERPL-SCNC: 102 MMOL/L (ref 98–107)
CO2 SERPL-SCNC: 23 MMOL/L (ref 21–32)
CREAT SERPL-MCNC: 0.54 MG/DL (ref 0.5–1.05)
EGFRCR SERPLBLD CKD-EPI 2021: >90 ML/MIN/1.73M*2
ERYTHROCYTE [DISTWIDTH] IN BLOOD BY AUTOMATED COUNT: 16.5 % (ref 11.5–14.5)
GLUCOSE SERPL-MCNC: 77 MG/DL (ref 74–99)
HCT VFR BLD AUTO: 29.9 % (ref 36–46)
HGB BLD-MCNC: 9.2 G/DL (ref 12–16)
MCH RBC QN AUTO: 29.5 PG (ref 26–34)
MCHC RBC AUTO-ENTMCNC: 30.8 G/DL (ref 32–36)
MCV RBC AUTO: 96 FL (ref 80–100)
NRBC BLD-RTO: 0.3 /100 WBCS (ref 0–0)
PLATELET # BLD AUTO: 456 X10*3/UL (ref 150–450)
POTASSIUM SERPL-SCNC: 3.7 MMOL/L (ref 3.5–5.3)
RBC # BLD AUTO: 3.12 X10*6/UL (ref 4–5.2)
SODIUM SERPL-SCNC: 137 MMOL/L (ref 136–145)
WBC # BLD AUTO: 6.1 X10*3/UL (ref 4.4–11.3)

## 2025-06-24 PROCEDURE — 1100000001 HC PRIVATE ROOM DAILY

## 2025-06-24 PROCEDURE — 2500000004 HC RX 250 GENERAL PHARMACY W/ HCPCS (ALT 636 FOR OP/ED)

## 2025-06-24 PROCEDURE — 2500000001 HC RX 250 WO HCPCS SELF ADMINISTERED DRUGS (ALT 637 FOR MEDICARE OP)

## 2025-06-24 PROCEDURE — 2500000005 HC RX 250 GENERAL PHARMACY W/O HCPCS

## 2025-06-24 PROCEDURE — 2500000001 HC RX 250 WO HCPCS SELF ADMINISTERED DRUGS (ALT 637 FOR MEDICARE OP): Performed by: STUDENT IN AN ORGANIZED HEALTH CARE EDUCATION/TRAINING PROGRAM

## 2025-06-24 PROCEDURE — 99233 SBSQ HOSP IP/OBS HIGH 50: CPT

## 2025-06-24 PROCEDURE — 36415 COLL VENOUS BLD VENIPUNCTURE: CPT | Performed by: NURSE PRACTITIONER

## 2025-06-24 PROCEDURE — 80048 BASIC METABOLIC PNL TOTAL CA: CPT | Performed by: NURSE PRACTITIONER

## 2025-06-24 PROCEDURE — 85027 COMPLETE CBC AUTOMATED: CPT | Performed by: NURSE PRACTITIONER

## 2025-06-24 PROCEDURE — 2500000002 HC RX 250 W HCPCS SELF ADMINISTERED DRUGS (ALT 637 FOR MEDICARE OP, ALT 636 FOR OP/ED): Performed by: STUDENT IN AN ORGANIZED HEALTH CARE EDUCATION/TRAINING PROGRAM

## 2025-06-24 PROCEDURE — 2500000004 HC RX 250 GENERAL PHARMACY W/ HCPCS (ALT 636 FOR OP/ED): Performed by: STUDENT IN AN ORGANIZED HEALTH CARE EDUCATION/TRAINING PROGRAM

## 2025-06-24 RX ORDER — SODIUM CHLORIDE 9 MG/ML
75 INJECTION, SOLUTION INTRAVENOUS CONTINUOUS
Status: DISCONTINUED | OUTPATIENT
Start: 2025-06-24 | End: 2025-06-25

## 2025-06-24 RX ORDER — KETOROLAC TROMETHAMINE 30 MG/ML
7.5 INJECTION, SOLUTION INTRAMUSCULAR; INTRAVENOUS EVERY 6 HOURS PRN
Status: DISCONTINUED | OUTPATIENT
Start: 2025-06-24 | End: 2025-06-25 | Stop reason: HOSPADM

## 2025-06-24 RX ADMIN — GABAPENTIN 1800 MG: 300 CAPSULE ORAL at 20:25

## 2025-06-24 RX ADMIN — KETOROLAC TROMETHAMINE 7.5 MG: 30 INJECTION, SOLUTION INTRAMUSCULAR at 10:04

## 2025-06-24 RX ADMIN — ACETAMINOPHEN 650 MG: 325 TABLET, FILM COATED ORAL at 11:49

## 2025-06-24 RX ADMIN — OXYCODONE HYDROCHLORIDE 5 MG: 5 TABLET ORAL at 11:49

## 2025-06-24 RX ADMIN — ENOXAPARIN SODIUM 40 MG: 40 INJECTION SUBCUTANEOUS at 09:37

## 2025-06-24 RX ADMIN — SODIUM CHLORIDE 75 ML/HR: 0.9 INJECTION, SOLUTION INTRAVENOUS at 10:06

## 2025-06-24 RX ADMIN — ACETAMINOPHEN 650 MG: 325 TABLET, FILM COATED ORAL at 17:49

## 2025-06-24 RX ADMIN — ACETAMINOPHEN 650 MG: 325 TABLET, FILM COATED ORAL at 00:55

## 2025-06-24 RX ADMIN — LIDOCAINE 4% 1 PATCH: 40 PATCH TOPICAL at 09:37

## 2025-06-24 RX ADMIN — ACETAMINOPHEN 650 MG: 325 TABLET, FILM COATED ORAL at 04:43

## 2025-06-24 RX ADMIN — GABAPENTIN 1200 MG: 400 CAPSULE ORAL at 09:37

## 2025-06-24 RX ADMIN — KETOROLAC TROMETHAMINE 7.5 MG: 30 INJECTION, SOLUTION INTRAMUSCULAR at 16:05

## 2025-06-24 RX ADMIN — OXYCODONE HYDROCHLORIDE 5 MG: 5 TABLET ORAL at 17:49

## 2025-06-24 RX ADMIN — OXYCODONE HYDROCHLORIDE 5 MG: 5 TABLET ORAL at 04:44

## 2025-06-24 RX ADMIN — LEVOTHYROXINE SODIUM 100 MCG: 0.1 TABLET ORAL at 04:44

## 2025-06-24 ASSESSMENT — PAIN DESCRIPTION - DESCRIPTORS
DESCRIPTORS: ACHING;PRESSURE
DESCRIPTORS: ACHING
DESCRIPTORS: ACHING;THROBBING
DESCRIPTORS: ACHING;THROBBING
DESCRIPTORS: ACHING

## 2025-06-24 ASSESSMENT — COGNITIVE AND FUNCTIONAL STATUS - GENERAL
MOBILITY SCORE: 24
DAILY ACTIVITIY SCORE: 24

## 2025-06-24 ASSESSMENT — PAIN SCALES - GENERAL
PAINLEVEL_OUTOF10: 0 - NO PAIN
PAINLEVEL_OUTOF10: 5 - MODERATE PAIN
PAINLEVEL_OUTOF10: 7
PAINLEVEL_OUTOF10: 6
PAINLEVEL_OUTOF10: 6
PAINLEVEL_OUTOF10: 7
PAINLEVEL_OUTOF10: 5 - MODERATE PAIN
PAINLEVEL_OUTOF10: 7
PAINLEVEL_OUTOF10: 6
PAINLEVEL_OUTOF10: 5 - MODERATE PAIN
PAINLEVEL_OUTOF10: 7
PAINLEVEL_OUTOF10: 7
PAINLEVEL_OUTOF10: 9
PAINLEVEL_OUTOF10: 5 - MODERATE PAIN

## 2025-06-24 ASSESSMENT — PAIN DESCRIPTION - LOCATION
LOCATION: ABDOMEN

## 2025-06-24 NOTE — PROGRESS NOTES
"Barbara Sow is a 65 y.o. female on day 8 of admission presenting with Large bowel obstruction (Multi).    Subjective   Patient is awake and uncomfortable in bed this morning. She was having more pain across her whole abdomen. She doesn't feel like the bloating is getting much better. She had a lot of bowel movements yesterday with the golytly       Objective     Physical Exam  Constitutional:       Appearance: Normal appearance.   Cardiovascular:      Rate and Rhythm: Normal rate and regular rhythm.   Pulmonary:      Effort: Pulmonary effort is normal.      Comments: Non labored breathing on RA  Abdominal:      General: There is distension (Getting softer).      Tenderness: There is abdominal tenderness (Less than yesterday).      Comments: Lap sites C/D/I, edges well approximated, covered in Dermabond. ABD binder on    Skin:     General: Skin is warm and dry.   Neurological:      General: No focal deficit present.      Mental Status: She is alert and oriented to person, place, and time. Mental status is at baseline.   Psychiatric:         Attention and Perception: Attention normal.         Mood and Affect: Mood normal.         Speech: Speech normal.         Behavior: Behavior normal.         Cognition and Memory: Cognition normal.         Last Recorded Vitals  Blood pressure 102/58, pulse 62, temperature 36.7 °C (98.1 °F), temperature source Temporal, resp. rate 16, height 1.702 m (5' 7\"), weight 62 kg (136 lb 11 oz), SpO2 90%.  Intake/Output last 3 Shifts:  I/O last 3 completed shifts:  In: 2542.5 (41 mL/kg) [P.O.:120; I.V.:838.8 (13.5 mL/kg); IV Piggyback:1583.8]  Out: - (0 mL/kg)   Weight: 62 kg     Relevant Results  Scheduled medications  Scheduled Medications[1]  Continuous medications  Continuous Medications[2]  PRN medications  PRN Medications[3]   Results for orders placed or performed during the hospital encounter of 06/16/25 (from the past 24 hours)   Basic Metabolic Panel   Result Value Ref Range "    Glucose 77 74 - 99 mg/dL    Sodium 137 136 - 145 mmol/L    Potassium 3.7 3.5 - 5.3 mmol/L    Chloride 102 98 - 107 mmol/L    Bicarbonate 23 21 - 32 mmol/L    Anion Gap 16 10 - 20 mmol/L    Urea Nitrogen 10 6 - 23 mg/dL    Creatinine 0.54 0.50 - 1.05 mg/dL    eGFR >90 >60 mL/min/1.73m*2    Calcium 7.6 (L) 8.6 - 10.3 mg/dL   CBC   Result Value Ref Range    WBC 6.1 4.4 - 11.3 x10*3/uL    nRBC 0.3 (H) 0.0 - 0.0 /100 WBCs    RBC 3.12 (L) 4.00 - 5.20 x10*6/uL    Hemoglobin 9.2 (L) 12.0 - 16.0 g/dL    Hematocrit 29.9 (L) 36.0 - 46.0 %    MCV 96 80 - 100 fL    MCH 29.5 26.0 - 34.0 pg    MCHC 30.8 (L) 32.0 - 36.0 g/dL    RDW 16.5 (H) 11.5 - 14.5 %    Platelets 456 (H) 150 - 450 x10*3/uL      XR abdomen 1 view   Final Result   Markedly dilated air-filled right colon and distended left mid   abdominal bowel loops similar to 06/21/2025.        MACRO:   None.        Signed by: Talia Hager 6/23/2025 9:28 AM   Dictation workstation:   [id number]      CT abdomen pelvis wo IV contrast   Final Result      XR abdomen 2 views supine and erect or decub   Final Result   1. Mild progression of colonic distention. CT may be helpful to   assess for etiology.   2. Enteric tube is not evident and likely removed. Clinical   correlation is recommended.        Signed by: Shailesh Wyman 6/21/2025 11:05 AM   Dictation workstation:   [id number]      XR chest abdomen for OG NG placement   Final Result   NG tube as above.             MACRO:   None        Signed by: Tom Foss 6/21/2025 3:18 AM   Dictation workstation:   [id number]      XR chest abdomen for OG NG placement   Final Result   NG tube in place as described.        Mild atelectasis at the lung bases.        Cardiomegaly.        Nonspecific gaseous distention of large and small bowel loops in the   imaged upper abdomen.        MACRO:   None        Signed by: Todd Sales 6/20/2025 4:14 PM   Dictation workstation:   [id number]      XR abdomen 2 views supine and erect or decub    Final Result   Moderate stool in the cecum. Moderate gaseous distention of multiple   small and large bowel loops as described. Question diffuse ileus.   Distal colonic obstruction is not excluded.        No pneumoperitoneum.        MACRO:   None        Signed by: Todd Sales 6/19/2025 1:17 PM   Dictation workstation:   [id number]                              Assessment & Plan    Neuro: Acute post-op pain well controlled     Hx: anxiety, B/l sciatica, R cervical and lumbar radiculopathy, OA, DDD   - OOB ambulate as tolerated at least 5 times per day  - Continue current pain control regimen    Cardiovascular: /58 (BP Location: Left arm, Patient Position: Lying)   Pulse 62     - VS Q4h    Pulmonary: Non labored breathing on RA  - Encourage IS  - Encourage ambulation    Gastrointestinal: POD 8 Lx splenic flexure takedown ; Laparoscopic splenic flexure resection ; Colostomy closure ;   SAMI block Findings: Minimal adhesions, tattoo proximal and distal to flexure noted.  Colon resection with great bleeding at the colon edge and      Hx: LBO  - NPO sips with meds  - 2 rounds neostigmine 6/21 without any bowel movements. Bradycardia with 2nd round, atropine given  - Colonoscopy attempted for decompression 6/22  - Finished 4L golytly yesterday  - PRN antiemetic    Genitourinary: Voiding independently  - continue mIVF  - Monitor I&Os   - Cr: 0.54  - Continue to trend electrolytes with daily labs    Hematology:   - H/H: 8.7/27.8 -> 9.2/29.9  - DVT ppx: SCDs/ Lovenox  - No active s/s of bleeding    Infectious Disease:   - WBC: 8.7 -> 6.1  - Afebrile, VSS  - Continue to monitor for s/s of infection  - Continue to monitor WBC with daily CBC     Discussed with Dr. Valencia. Can do some sips of clears and popsicles today still waiting for abdominal distension to improve.       I spent 50 minutes in the professional and overall care of this patient.      Toby Brantley PA-C           [1] acetaminophen, 650 mg, oral,  q6h  atropine, 1 mg, intravenous, Once  enoxaparin, 40 mg, subcutaneous, q24h  gabapentin, 1,200 mg, oral, q AM  gabapentin, 1,800 mg, oral, Nightly  levothyroxine, 100 mcg, oral, Daily  lidocaine, 1 patch, transdermal, Daily  scopolamine, 1 patch, transdermal, q72h     [2] sodium chloride 0.9%, 75 mL/hr     [3] PRN medications: ketorolac, magnesium hydroxide, naloxone, ondansetron **OR** ondansetron, oxyCODONE, zinc oxide

## 2025-06-24 NOTE — CARE PLAN
"The patient's goals for the shift include \"hopefully get some sleep\"    The clinical goals for the shift include maintain adequate pain control by end of shift    Over the shift, the patient did not make progress toward the following goals. Barriers to progression include pain control is improving. Recommendations to address these barriers include .  alternative pain control methods ice, heat, support in family.   "

## 2025-06-24 NOTE — CARE PLAN
"The patient's goals for the shift include \"hopefully get some sleep\"    The clinical goals for the shift include maintain adequate pain control by end of shift    Over the shift, the patient did not make progress toward the following goals. Barriers to progression include . Recommendations to address these barriers include   Problem: Pain - Adult  Goal: Verbalizes/displays adequate comfort level or baseline comfort level  Outcome: Progressing   .    "

## 2025-06-24 NOTE — CARE PLAN
The patient's goals for the shift include Pt. will have a safe, restful and uneventful evening    The clinical goals for the shift include Pt. will remian free of falls and injury this shift      Problem: Pain - Adult  Goal: Verbalizes/displays adequate comfort level or baseline comfort level  Outcome: Progressing  Flowsheets (Taken 6/24/2025 1944)  Verbalizes/displays adequate comfort level or baseline comfort level:   Encourage patient to monitor pain and request assistance   Assess pain using appropriate pain scale     Problem: Safety - Adult  Goal: Free from fall injury  Outcome: Progressing  Flowsheets (Taken 6/24/2025 1944)  Free from fall injury: Instruct family/caregiver on patient safety     Problem: Discharge Planning  Goal: Discharge to home or other facility with appropriate resources  Outcome: Progressing  Flowsheets (Taken 6/24/2025 1944)  Discharge to home or other facility with appropriate resources: Identify barriers to discharge with patient and caregiver     Problem: Chronic Conditions and Co-morbidities  Goal: Patient's chronic conditions and co-morbidity symptoms are monitored and maintained or improved  Outcome: Progressing  Flowsheets (Taken 6/24/2025 1944)  Care Plan - Patient's Chronic Conditions and Co-Morbidity Symptoms are Monitored and Maintained or Improved:   Monitor and assess patient's chronic conditions and comorbid symptoms for stability, deterioration, or improvement   Collaborate with multidisciplinary team to address chronic and comorbid conditions and prevent exacerbation or deterioration     Problem: Nutrition  Goal: Nutrient intake appropriate for maintaining nutritional needs  Outcome: Progressing     Problem: Pain  Goal: Takes deep breaths with improved pain control throughout the shift  Outcome: Progressing  Goal: Turns in bed with improved pain control throughout the shift  Outcome: Progressing  Goal: Walks with improved pain control throughout the shift  Outcome:  Progressing  Goal: Performs ADL's with improved pain control throughout shift  Outcome: Progressing  Goal: Participates in PT with improved pain control throughout the shift  Outcome: Progressing  Goal: Free from opioid side effects throughout the shift  Outcome: Progressing  Goal: Free from acute confusion related to pain meds throughout the shift  Outcome: Progressing     Problem: Fall/Injury  Goal: Not fall by end of shift  Outcome: Progressing  Goal: Be free from injury by end of the shift  Outcome: Progressing

## 2025-06-24 NOTE — CARE PLAN
"The patient's goals for the shift include \"hopefully get some sleep\"    The clinical goals for the shift include maintain adequate pain control by end of shift      "

## 2025-06-24 NOTE — CONSULTS
"Nutrition Assessment Note  Nutrition Assessment      Reason for Assessment: Admission nursing screening  Admitted for LBO. POD#8  splenic flexure takedown, Laparoscopic splenic flexure resection; Colostomy closure.  MST score of 3 for unsure weight loss & decreased appetite.  No significant weight loss identified.  Very minimal intake since admit d/t altered GI function. Abdominal distention with discomfort noted today. 6/23 + BM noted.     History:  Energy Intake: Good > 75 %  Food and Nutrient History: Patient not in room. In Preop note she reported good appetite.    Problem List[1]     Anthropometrics:  Height: 170.2 cm (5' 7.01\")  Weight: 62 kg (136 lb 11 oz)  BMI (Calculated): 21.4    Weight Change: 0    Weight History / % Weight Change: 12/19/24: 56.7kg, 10/10/24: 57.2kg, 7/21/24: 65kg.  Significant Weight Loss: No    IBW/kg (Dietitian Calculated): 61.2 kg     Amputation Calculations:  BMI Amputation Adjustment: No    Energy Needs:  Method for Estimating Needs: 1985-2108 @ 32-34 kcal/kg    Method for Estimating 24 Hour Protein Needs: 73-85 @ 1.2-1.4 gr/kg IBW    Total Fluid Estimated Needs in 24 Hours (mL): 1830 mL  Total Fluid Estimated Needs in 24 hours (mL/kg): 30 mL/kg       Dietary Orders (From admission, onward)       Start     Ordered    06/23/25 1746  NPO Diet Except: Ice chips; Effective now  Diet effective now        Comments: Pt may have popsicle   Question:  Except:  Answer:  Ice chips    06/23/25 1746    06/22/25 0305  May Participate in Room Service With Assistance  ( ROOM SERVICE MAY PARTICIPATE WITH ASSISTANCE)  Once        Question:  .  Answer:  Yes    06/22/25 0304    06/17/25 1403  Oral nutritional supplements  Until discontinued        Question Answer Comment   Deliver with All meals    Select supplement: Ensure Clear        06/17/25 1402                     Nutrition Focused Physical Findings:   Patient n/a.    Nutrition Diagnosis      Patient has Nutrition Diagnosis: Yes  Nutrition " Diagnosis 1: Inadequate protein energy intake  Diagnosis Status (1): New  Related to (1): altered GI function postop  As Evidenced by (1): NPO day 3.       Nutrition Interventions/Recommendations   Nutrition Prescription: Nutrition prescription for oral nutrition  Individualized Nutrition Prescription Provided for : MD to advance diet as patient can tolerate.  If NPO/CLD >5 days, consider nutrition support until able to tolerate oral intake.  MD to manage IVF as intdicated.    Food and/or Nutrient Delivery Interventions  Meals and Snacks: Fiber-modified diet  Goal: intake meets >75% estimated nutrient needs.      Education Documentation  No documentation found.    N/A       Nutrition Monitoring and Evaluation   Food and Nutrient Related History   Fluid Intake: Estimated fluid intake    Intake / Amount of food: Other criteria  Criteria: NPO/CLD < 5 days.    Anthropometrics: Body Composition/Growth/Weight History  Body Weight: Body weight - Maintain stable weight    Biochemical Data, Medical Tests and Procedures  Electrolyte and Renal Panel: BUN, Calcium, serum, Chloride, Creatinine, Magnesium, Phosphorus, Potassium, Sodium  Criteria: as indicated    Glucose/Endocrine Profile: Glucose within normal limits ( mg/dL)  Criteria: as indicated    Nutrition Focused Physical Findings   Digestive System Finding: Anorexia, Constipation, Diarrhea, Early satiety, Nausea, Vomiting, Abdominal distension, Abdominal pain  Criteria: daily    Last Date of Nutrition Visit: 06/24/25  Nutrition Follow-Up Needed?: Dietitian to reassess per policy  Follow up Comment: MST/SOLANGE d3-TR            [1]   Patient Active Problem List  Diagnosis    Bilateral sciatica    Right lumbar radiculopathy    Right cervical radiculopathy    Prepatellar bursitis of left knee    Osteoarthritis of finger    Osteoarthritis cervical spine    OA (osteoarthritis) of knee    Lumbar degenerative disc disease    Ileus (Multi)    Hypothyroidism    Headache     Cervical pain (neck)    Contusion of leg, right, multiple sites, initial encounter    Constipation    Cold sore    Closed fracture of shaft of fibula    Anxiety    Acute sinusitis    Abnormal weight loss    Abdominal pain    Adjustment disorder with mixed anxiety and depressed mood    Colostomy malfunction (Multi)    Lower GI bleed    Colon stricture (Multi)    Large bowel obstruction (Multi)

## 2025-06-24 NOTE — NURSING NOTE
Transferred to new unit. Bedside report given to RN Javed. Patient comfortable, oriented to new room and surroundings.   This RN offered to call daughter to update on new room location; patient declined, stating she was already aware of the move.  Safety maintained.

## 2025-06-25 ENCOUNTER — APPOINTMENT (OUTPATIENT)
Dept: PRIMARY CARE | Facility: CLINIC | Age: 65
End: 2025-06-25
Payer: MEDICARE

## 2025-06-25 ENCOUNTER — PHARMACY VISIT (OUTPATIENT)
Dept: PHARMACY | Facility: CLINIC | Age: 65
End: 2025-06-25
Payer: COMMERCIAL

## 2025-06-25 VITALS
TEMPERATURE: 97.7 F | HEIGHT: 67 IN | DIASTOLIC BLOOD PRESSURE: 75 MMHG | RESPIRATION RATE: 19 BRPM | WEIGHT: 136.69 LBS | BODY MASS INDEX: 21.45 KG/M2 | SYSTOLIC BLOOD PRESSURE: 134 MMHG | OXYGEN SATURATION: 93 % | HEART RATE: 70 BPM

## 2025-06-25 LAB
ANION GAP SERPL CALC-SCNC: 17 MMOL/L (ref 10–20)
BUN SERPL-MCNC: 7 MG/DL (ref 6–23)
CALCIUM SERPL-MCNC: 7.5 MG/DL (ref 8.6–10.3)
CHLORIDE SERPL-SCNC: 105 MMOL/L (ref 98–107)
CO2 SERPL-SCNC: 18 MMOL/L (ref 21–32)
CREAT SERPL-MCNC: 0.53 MG/DL (ref 0.5–1.05)
EGFRCR SERPLBLD CKD-EPI 2021: >90 ML/MIN/1.73M*2
ERYTHROCYTE [DISTWIDTH] IN BLOOD BY AUTOMATED COUNT: 16.5 % (ref 11.5–14.5)
GLUCOSE SERPL-MCNC: 73 MG/DL (ref 74–99)
HCT VFR BLD AUTO: 28.1 % (ref 36–46)
HGB BLD-MCNC: 9.5 G/DL (ref 12–16)
MCH RBC QN AUTO: 29.9 PG (ref 26–34)
MCHC RBC AUTO-ENTMCNC: 33.8 G/DL (ref 32–36)
MCV RBC AUTO: 88 FL (ref 80–100)
NRBC BLD-RTO: 0 /100 WBCS (ref 0–0)
PLATELET # BLD AUTO: 470 X10*3/UL (ref 150–450)
POTASSIUM SERPL-SCNC: 3.2 MMOL/L (ref 3.5–5.3)
RBC # BLD AUTO: 3.18 X10*6/UL (ref 4–5.2)
SODIUM SERPL-SCNC: 137 MMOL/L (ref 136–145)
WBC # BLD AUTO: 6.6 X10*3/UL (ref 4.4–11.3)

## 2025-06-25 PROCEDURE — 2500000004 HC RX 250 GENERAL PHARMACY W/ HCPCS (ALT 636 FOR OP/ED)

## 2025-06-25 PROCEDURE — 2500000002 HC RX 250 W HCPCS SELF ADMINISTERED DRUGS (ALT 637 FOR MEDICARE OP, ALT 636 FOR OP/ED): Performed by: STUDENT IN AN ORGANIZED HEALTH CARE EDUCATION/TRAINING PROGRAM

## 2025-06-25 PROCEDURE — 2500000004 HC RX 250 GENERAL PHARMACY W/ HCPCS (ALT 636 FOR OP/ED): Performed by: STUDENT IN AN ORGANIZED HEALTH CARE EDUCATION/TRAINING PROGRAM

## 2025-06-25 PROCEDURE — 80048 BASIC METABOLIC PNL TOTAL CA: CPT | Performed by: NURSE PRACTITIONER

## 2025-06-25 PROCEDURE — 99239 HOSP IP/OBS DSCHRG MGMT >30: CPT | Performed by: STUDENT IN AN ORGANIZED HEALTH CARE EDUCATION/TRAINING PROGRAM

## 2025-06-25 PROCEDURE — 36415 COLL VENOUS BLD VENIPUNCTURE: CPT | Performed by: NURSE PRACTITIONER

## 2025-06-25 PROCEDURE — 2500000001 HC RX 250 WO HCPCS SELF ADMINISTERED DRUGS (ALT 637 FOR MEDICARE OP)

## 2025-06-25 PROCEDURE — 2500000005 HC RX 250 GENERAL PHARMACY W/O HCPCS

## 2025-06-25 PROCEDURE — 85027 COMPLETE CBC AUTOMATED: CPT | Performed by: NURSE PRACTITIONER

## 2025-06-25 PROCEDURE — 2500000001 HC RX 250 WO HCPCS SELF ADMINISTERED DRUGS (ALT 637 FOR MEDICARE OP): Performed by: STUDENT IN AN ORGANIZED HEALTH CARE EDUCATION/TRAINING PROGRAM

## 2025-06-25 PROCEDURE — RXMED WILLOW AMBULATORY MEDICATION CHARGE

## 2025-06-25 RX ORDER — GABAPENTIN 400 MG/1
1200 CAPSULE ORAL EVERY MORNING
Qty: 21 CAPSULE | Refills: 0 | Status: SHIPPED | OUTPATIENT
Start: 2025-06-26 | End: 2025-07-03

## 2025-06-25 RX ORDER — POTASSIUM CHLORIDE 20 MEQ/1
20 TABLET, EXTENDED RELEASE ORAL ONCE
Status: COMPLETED | OUTPATIENT
Start: 2025-06-25 | End: 2025-06-25

## 2025-06-25 RX ORDER — GABAPENTIN 300 MG/1
1800 CAPSULE ORAL NIGHTLY
Qty: 42 CAPSULE | Refills: 0 | Status: SHIPPED | OUTPATIENT
Start: 2025-06-25 | End: 2025-07-02

## 2025-06-25 RX ORDER — ONDANSETRON 4 MG/1
4 TABLET, ORALLY DISINTEGRATING ORAL EVERY 8 HOURS PRN
Qty: 20 TABLET | Refills: 0 | Status: SHIPPED | OUTPATIENT
Start: 2025-06-25

## 2025-06-25 RX ORDER — POLYETHYLENE GLYCOL 3350 17 G/17G
17 POWDER, FOR SOLUTION ORAL DAILY
Qty: 238 G | Refills: 0 | Status: SHIPPED | OUTPATIENT
Start: 2025-06-25

## 2025-06-25 RX ADMIN — SCOPOLAMINE 1 PATCH: 1.5 PATCH, EXTENDED RELEASE TRANSDERMAL at 11:39

## 2025-06-25 RX ADMIN — KETOROLAC TROMETHAMINE 7.5 MG: 30 INJECTION, SOLUTION INTRAMUSCULAR at 04:46

## 2025-06-25 RX ADMIN — OXYCODONE HYDROCHLORIDE 5 MG: 5 TABLET ORAL at 13:41

## 2025-06-25 RX ADMIN — OXYCODONE HYDROCHLORIDE 5 MG: 5 TABLET ORAL at 06:32

## 2025-06-25 RX ADMIN — ACETAMINOPHEN 650 MG: 325 TABLET, FILM COATED ORAL at 00:01

## 2025-06-25 RX ADMIN — ACETAMINOPHEN 650 MG: 325 TABLET, FILM COATED ORAL at 06:25

## 2025-06-25 RX ADMIN — GABAPENTIN 1200 MG: 400 CAPSULE ORAL at 09:12

## 2025-06-25 RX ADMIN — POTASSIUM CHLORIDE 20 MEQ: 1500 TABLET, EXTENDED RELEASE ORAL at 11:40

## 2025-06-25 RX ADMIN — ENOXAPARIN SODIUM 40 MG: 40 INJECTION SUBCUTANEOUS at 09:12

## 2025-06-25 RX ADMIN — KETOROLAC TROMETHAMINE 7.5 MG: 30 INJECTION, SOLUTION INTRAMUSCULAR at 10:56

## 2025-06-25 RX ADMIN — OXYCODONE HYDROCHLORIDE 5 MG: 5 TABLET ORAL at 00:05

## 2025-06-25 RX ADMIN — ACETAMINOPHEN 650 MG: 325 TABLET, FILM COATED ORAL at 11:39

## 2025-06-25 RX ADMIN — LEVOTHYROXINE SODIUM 100 MCG: 0.1 TABLET ORAL at 06:25

## 2025-06-25 RX ADMIN — LIDOCAINE 4% 1 PATCH: 40 PATCH TOPICAL at 09:11

## 2025-06-25 ASSESSMENT — COGNITIVE AND FUNCTIONAL STATUS - GENERAL
MOBILITY SCORE: 24
DAILY ACTIVITIY SCORE: 24

## 2025-06-25 ASSESSMENT — PAIN - FUNCTIONAL ASSESSMENT
PAIN_FUNCTIONAL_ASSESSMENT: 0-10

## 2025-06-25 ASSESSMENT — PAIN SCALES - GENERAL
PAINLEVEL_OUTOF10: 5 - MODERATE PAIN
PAINLEVEL_OUTOF10: 6
PAINLEVEL_OUTOF10: 8
PAINLEVEL_OUTOF10: 10 - WORST POSSIBLE PAIN
PAINLEVEL_OUTOF10: 4
PAINLEVEL_OUTOF10: 8
PAINLEVEL_OUTOF10: 6
PAINLEVEL_OUTOF10: 4
PAINLEVEL_OUTOF10: 8
PAINLEVEL_OUTOF10: 6

## 2025-06-25 ASSESSMENT — PAIN DESCRIPTION - LOCATION
LOCATION: ABDOMEN

## 2025-06-25 ASSESSMENT — PAIN DESCRIPTION - DESCRIPTORS: DESCRIPTORS: ACHING

## 2025-06-25 NOTE — CARE PLAN
The patient's goals for the shift include Pt. will have a safe, restful and uneventful evening    The clinical goals for the shift include Remain safe, HDS, maintain skin integrity, have a BM, decrease abdominal distention/bloating, and manage/control pain throughout shift.    Problem: Pain - Adult  Goal: Verbalizes/displays adequate comfort level or baseline comfort level  Outcome: Progressing     Problem: Safety - Adult  Goal: Free from fall injury  Outcome: Progressing     Problem: Discharge Planning  Goal: Discharge to home or other facility with appropriate resources  Outcome: Progressing     Problem: Chronic Conditions and Co-morbidities  Goal: Patient's chronic conditions and co-morbidity symptoms are monitored and maintained or improved  Outcome: Progressing     Problem: Nutrition  Goal: Nutrient intake appropriate for maintaining nutritional needs  Outcome: Progressing     Problem: Pain  Goal: Takes deep breaths with improved pain control throughout the shift  Outcome: Progressing  Goal: Turns in bed with improved pain control throughout the shift  Outcome: Progressing  Goal: Walks with improved pain control throughout the shift  Outcome: Progressing  Goal: Performs ADL's with improved pain control throughout shift  Outcome: Progressing  Goal: Participates in PT with improved pain control throughout the shift  Outcome: Progressing  Goal: Free from opioid side effects throughout the shift  Outcome: Progressing  Goal: Free from acute confusion related to pain meds throughout the shift  Outcome: Progressing     Problem: Fall/Injury  Goal: Not fall by end of shift  Outcome: Progressing  Goal: Be free from injury by end of the shift  Outcome: Progressing

## 2025-06-25 NOTE — PROGRESS NOTES
06/25/25 1247   Discharge Planning   Home or Post Acute Services None   Expected Discharge Disposition Home   Does the patient need discharge transport arranged? No   Stroke Family Assessment   Stroke Family Assessment Needed No   Intensity of Service   Intensity of Service 0-30 min     Patient medically cleared for discharge home.  DISP: home  DME: none  HHC: none at this time  WOUNDS: old ostomy site, surgical  ADOD:today  Patient denies any discharge needs.  Dominga Durand RN

## 2025-06-25 NOTE — DISCHARGE SUMMARY
Discharge Diagnosis  Large bowel obstruction (Multi)    Issues Requiring Follow-Up  S/P laparoscopic colostomy closure    Test Results Pending At Discharge  Pending Labs       Order Current Status    Surgical Pathology Exam In process            Hospital Course   Briefly, the patient is a 65 year-old female with PMH of large bowel obstruction from splenic flexure stricture that underwent a transverse colostomy in 2024.   Pt opts for colostomy reversal     HOSPITAL COURSE: The patient underwent laparoscopic splenic flexure takedown/resection with colostomy closure on 6/16/25 which patient tolerated very well and remained stable in the postoperative period. On postoperative day #1, patient was tolerating a diet, and remained afebrile with stable vital signs. Patient was ordered oral narcotics for pain control. Post op course complicated by pseudo-obstruction and colonic dilatation up to 11mm. She underwent neostigmine administration x2, endoscopic decompression, and a Golytely bowel regimen with improvement in distention and return of bowel function.   On day of discharge patient was tolerating a diet well, afebrile with stable vital signs and lab values, and had adequate pain control. The wound was clean and dry. Patient was discharged home and instructed to follow up in the office within 4 weeks after discharge and was given prescription for gabapentin for pain, and zofran for nausea. Patient was instructed that is she develops a poor appetite to call the office to arrange outpatient IV fluid administration.           Visit Vitals  /74 (BP Location: Left arm, Patient Position: Sitting)   Pulse 71   Temp 36.8 °C (98.2 °F) (Temporal)   Resp 24     Vitals:    06/24/25 1850   Weight: 62 kg (136 lb 11 oz)       Immunization History   Administered Date(s) Administered    Influenza, Unspecified 11/08/2016    Tdap vaccine, age 7 year and older (BOOSTRIX, ADACEL) 08/03/2020       Results        Pertinent Physical Exam  At Time of Discharge  PE:  Constitutional: A&Ox3, calm and cooperative, NAD  Eyes: EOMI, clear sclera   Cardiovascular: Normal rate and regular rhythm  Respiratory/Thorax: CTAB, on RA  Gastrointestinal: abd taut but more soft in interval, moderately distended, hypoactive bowel sounds, mildly tender diffusely   Genitourinary: Voiding independently   Musculoskeletal: ROM intact  Extremities: No peripheral edema  Neurological: A&Ox3, No focal deficits   Psychological: Appropriate mood and behavior      Home Medications     Medication List      START taking these medications     * gabapentin 300 mg capsule; Commonly known as: Neurontin; Take 6   capsules (1,800 mg) by mouth once daily at bedtime for 7 days.   * gabapentin 400 mg capsule; Commonly known as: Neurontin; Take 3   capsules (1,200 mg) by mouth once daily in the morning for 7 days.; Start   taking on: June 26, 2025; Replaces: gabapentin 600 mg tablet   ondansetron ODT 4 mg disintegrating tablet; Commonly known as:   Zofran-ODT; Dissolve 1 tablet (4 mg) in the mouth every 8 hours if needed   for nausea or vomiting.   polyethylene glycol 17 gram packet; Commonly known as: Glycolax,   Miralax; Take 17 g by mouth once daily. Mix 1 cap (17g) into 8 ounces of   fluid.; Replaces: polyethylene glycol 17 gram/dose powder  * This list has 2 medication(s) that are the same as other medications   prescribed for you. Read the directions carefully, and ask your doctor or   other care provider to review them with you.     CONTINUE taking these medications     DULoxetine 60 mg DR capsule; Commonly known as: Cymbalta; Take 1 capsule   (60 mg) by mouth once daily. Do not crush or chew.   levothyroxine 100 mcg tablet; Commonly known as: Synthroid, Levoxyl;   Take 1 tablet (100 mcg) by mouth early in the morning.. Except on Sundays   take 2 tabs   ondansetron 8 mg tablet; Commonly known as: Zofran; Take 1 tablet (8 mg)   by mouth every 8 hours if needed for nausea or vomiting for  up to 15   doses.     STOP taking these medications     chlorhexidine 0.12 % solution; Commonly known as: Peridex   gabapentin 600 mg tablet; Commonly known as: Neurontin; Replaced by:   gabapentin 400 mg capsule   metroNIDAZOLE 250 mg tablet; Commonly known as: Flagyl   neomycin 500 mg tablet; Commonly known as: Mycifradin   nicotine 7 mg/24 hr patch; Commonly known as: Nicoderm CQ   polyethylene glycol 17 gram/dose powder; Commonly known as: Glycolax,   Miralax; Replaced by: polyethylene glycol 17 gram packet       Outpatient Follow-Up  Future Appointments   Date Time Provider Department Center   6/25/2025  3:00 PM Maninder Christensen MD [id number] [facility]   7/14/2025  1:20 PM Rivka Kim APRN-CNP [id number] [facility]       Talia Weeks PA-C

## 2025-06-26 ENCOUNTER — PATIENT OUTREACH (OUTPATIENT)
Dept: PRIMARY CARE | Facility: CLINIC | Age: 65
End: 2025-06-26
Payer: MEDICARE

## 2025-06-26 ENCOUNTER — TELEPHONE (OUTPATIENT)
Dept: SURGERY | Facility: CLINIC | Age: 65
End: 2025-06-26
Payer: MEDICARE

## 2025-06-26 LAB
LABORATORY COMMENT REPORT: NORMAL
PATH REPORT.FINAL DX SPEC: NORMAL
PATH REPORT.GROSS SPEC: NORMAL
PATH REPORT.RELEVANT HX SPEC: NORMAL
PATH REPORT.TOTAL CANCER: NORMAL

## 2025-06-26 NOTE — TELEPHONE ENCOUNTER
Post op call.  S/P 6/16/2025 Laparoscopic Takedown of colostomy, Splenic flexure resection.  The patient's daughter answered the phone.   She was not with the patient at time of call.  She will have Barbara call the office with an update once she returns home.  Chen Mishra RN

## 2025-06-26 NOTE — PROGRESS NOTES
Discharge Facility:[facility]  Discharge Diagnosis:Large bowel obstruction  Admission Date:6/16/25  Discharge Date: 6/25/25    PCP Appointment Date:7/3/25  Specialist Appointment Date:   Hospital Encounter and Summary Linked: Yes/  See discharge assessment below for further details  Admission (Discharged) with Ingrid Valencia MD (06/16/2025)   Wrap Up  Wrap Up Additional Comments: discharge spoke to her daughter she stated that she is improving. provided contact information encouraged call with questions. (6/26/2025 10:01 AM)    Engagement  Call Start Time: 1001 (6/26/2025 10:01 AM)    Medications  Medications reviewed with patient/caregiver?: Yes (gabapentin 300mg miralax zofran spoke to her daughter) (6/26/2025 10:01 AM)  Is the patient having any side effects they believe may be caused by any medication additions or changes?: No (6/26/2025 10:01 AM)  Does the patient have all medications ordered at discharge?: Yes (6/26/2025 10:01 AM)  Is the patient taking all medications as directed (includes completed medication regime)?: Yes (6/26/2025 10:01 AM)    Appointments  Does the patient have a primary care provider?: Yes (6/26/2025 10:01 AM)  Care Management Interventions: Verified appointment date/time/provider (6/26/2025 10:01 AM)  Has the patient kept scheduled appointments due by today?: Yes (6/26/2025 10:01 AM)    Self Management  Has home health visited the patient within 72 hours of discharge?: Not applicable (6/26/2025 10:01 AM)  Has all Durable Medical Equipment (DME) been delivered?: No (6/26/2025 10:01 AM)    Patient Teaching  Does the patient have access to their discharge instructions?: Yes (spoke to her daughter) (6/26/2025 10:01 AM)  Care Management Interventions: Reviewed instructions with patient (spoke to her daughter) (6/26/2025 10:01 AM)  What is the patient's perception of their health status since discharge?: Improving (6/26/2025 10:01 AM)  Is the patient/caregiver able to teach back the  hierarchy of who to call/visit for symptoms/problems? PCP, Specialist, Home Health nurse, Urgent Care, ED, 911: Yes (spoke to her daughter) (6/26/2025 10:01 AM)

## 2025-07-03 ENCOUNTER — APPOINTMENT (OUTPATIENT)
Dept: PRIMARY CARE | Facility: CLINIC | Age: 65
End: 2025-07-03
Payer: MEDICARE

## 2025-07-03 ENCOUNTER — HOSPITAL ENCOUNTER (EMERGENCY)
Facility: HOSPITAL | Age: 65
Discharge: HOME | End: 2025-07-03
Attending: EMERGENCY MEDICINE
Payer: MEDICARE

## 2025-07-03 ENCOUNTER — APPOINTMENT (OUTPATIENT)
Dept: CARDIOLOGY | Facility: HOSPITAL | Age: 65
End: 2025-07-03
Payer: MEDICARE

## 2025-07-03 ENCOUNTER — APPOINTMENT (OUTPATIENT)
Dept: RADIOLOGY | Facility: HOSPITAL | Age: 65
End: 2025-07-03
Payer: MEDICARE

## 2025-07-03 VITALS
RESPIRATION RATE: 14 BRPM | OXYGEN SATURATION: 98 % | TEMPERATURE: 98.1 F | SYSTOLIC BLOOD PRESSURE: 136 MMHG | WEIGHT: 126 LBS | HEART RATE: 77 BPM | HEIGHT: 67 IN | DIASTOLIC BLOOD PRESSURE: 79 MMHG | BODY MASS INDEX: 19.78 KG/M2

## 2025-07-03 DIAGNOSIS — R10.84 ABDOMINAL PAIN, GENERALIZED: Primary | ICD-10-CM

## 2025-07-03 DIAGNOSIS — N20.1 CALCULUS OF PROXIMAL LEFT URETER: ICD-10-CM

## 2025-07-03 DIAGNOSIS — N23 RENAL COLIC ON LEFT SIDE: ICD-10-CM

## 2025-07-03 LAB
ALBUMIN SERPL BCP-MCNC: 4.6 G/DL (ref 3.4–5)
ALP SERPL-CCNC: 141 U/L (ref 33–136)
ALT SERPL W P-5'-P-CCNC: 14 U/L (ref 7–45)
ANION GAP SERPL CALC-SCNC: 13 MMOL/L (ref 10–20)
APPEARANCE UR: CLEAR
AST SERPL W P-5'-P-CCNC: 20 U/L (ref 9–39)
ATRIAL RATE: 65 BPM
BASOPHILS # BLD AUTO: 0.16 X10*3/UL (ref 0–0.1)
BASOPHILS NFR BLD AUTO: 2 %
BILIRUB SERPL-MCNC: 0.2 MG/DL (ref 0–1.2)
BILIRUB UR STRIP.AUTO-MCNC: NEGATIVE MG/DL
BUN SERPL-MCNC: 13 MG/DL (ref 6–23)
CALCIUM SERPL-MCNC: 9.6 MG/DL (ref 8.6–10.3)
CHLORIDE SERPL-SCNC: 105 MMOL/L (ref 98–107)
CO2 SERPL-SCNC: 25 MMOL/L (ref 21–32)
COLOR UR: NORMAL
CREAT SERPL-MCNC: 0.57 MG/DL (ref 0.5–1.05)
CRP SERPL-MCNC: 0.59 MG/DL
EGFRCR SERPLBLD CKD-EPI 2021: >90 ML/MIN/1.73M*2
EOSINOPHIL # BLD AUTO: 0.92 X10*3/UL (ref 0–0.7)
EOSINOPHIL NFR BLD AUTO: 11.5 %
ERYTHROCYTE [DISTWIDTH] IN BLOOD BY AUTOMATED COUNT: 17.4 % (ref 11.5–14.5)
GLUCOSE SERPL-MCNC: 103 MG/DL (ref 74–99)
GLUCOSE UR STRIP.AUTO-MCNC: NORMAL MG/DL
HCT VFR BLD AUTO: 35.8 % (ref 36–46)
HGB BLD-MCNC: 11.4 G/DL (ref 12–16)
IMM GRANULOCYTES # BLD AUTO: 0.12 X10*3/UL (ref 0–0.7)
IMM GRANULOCYTES NFR BLD AUTO: 1.5 % (ref 0–0.9)
KETONES UR STRIP.AUTO-MCNC: NEGATIVE MG/DL
LACTATE SERPL-SCNC: 0.9 MMOL/L (ref 0.4–2)
LEUKOCYTE ESTERASE UR QL STRIP.AUTO: NEGATIVE
LIPASE SERPL-CCNC: 43 U/L (ref 9–82)
LYMPHOCYTES # BLD AUTO: 2.08 X10*3/UL (ref 1.2–4.8)
LYMPHOCYTES NFR BLD AUTO: 26 %
MAGNESIUM SERPL-MCNC: 2.35 MG/DL (ref 1.6–2.4)
MCH RBC QN AUTO: 30 PG (ref 26–34)
MCHC RBC AUTO-ENTMCNC: 31.8 G/DL (ref 32–36)
MCV RBC AUTO: 94 FL (ref 80–100)
MONOCYTES # BLD AUTO: 0.55 X10*3/UL (ref 0.1–1)
MONOCYTES NFR BLD AUTO: 6.9 %
MUCOUS THREADS #/AREA URNS AUTO: NORMAL /LPF
NEUTROPHILS # BLD AUTO: 4.16 X10*3/UL (ref 1.2–7.7)
NEUTROPHILS NFR BLD AUTO: 52.1 %
NITRITE UR QL STRIP.AUTO: NEGATIVE
NRBC BLD-RTO: 0 /100 WBCS (ref 0–0)
P AXIS: 73 DEGREES
PH UR STRIP.AUTO: 6 [PH]
PLATELET # BLD AUTO: 748 X10*3/UL (ref 150–450)
POTASSIUM SERPL-SCNC: 3.9 MMOL/L (ref 3.5–5.3)
PR INTERVAL: 172 MS
PROT SERPL-MCNC: 8.5 G/DL (ref 6.4–8.2)
PROT UR STRIP.AUTO-MCNC: NORMAL MG/DL
Q ONSET: 249 MS
QRS COUNT: 11 BEATS
QRS DURATION: 99 MS
QT INTERVAL: 435 MS
QTC CALCULATION(BAZETT): 460 MS
QTC FREDERICIA: 451 MS
R AXIS: 3 DEGREES
RBC # BLD AUTO: 3.8 X10*6/UL (ref 4–5.2)
RBC # UR STRIP.AUTO: NEGATIVE MG/DL
RBC #/AREA URNS AUTO: NORMAL /HPF
RBC MORPH BLD: NORMAL
SODIUM SERPL-SCNC: 139 MMOL/L (ref 136–145)
SP GR UR STRIP.AUTO: 1.03
T AXIS: 73 DEGREES
T OFFSET: 467 MS
UROBILINOGEN UR STRIP.AUTO-MCNC: NORMAL MG/DL
VENTRICULAR RATE: 67 BPM
WBC # BLD AUTO: 8 X10*3/UL (ref 4.4–11.3)
WBC #/AREA URNS AUTO: NORMAL /HPF

## 2025-07-03 PROCEDURE — 96376 TX/PRO/DX INJ SAME DRUG ADON: CPT

## 2025-07-03 PROCEDURE — 99285 EMERGENCY DEPT VISIT HI MDM: CPT | Mod: 25 | Performed by: EMERGENCY MEDICINE

## 2025-07-03 PROCEDURE — 81001 URINALYSIS AUTO W/SCOPE: CPT | Performed by: EMERGENCY MEDICINE

## 2025-07-03 PROCEDURE — 74177 CT ABD & PELVIS W/CONTRAST: CPT

## 2025-07-03 PROCEDURE — 96368 THER/DIAG CONCURRENT INF: CPT

## 2025-07-03 PROCEDURE — 83690 ASSAY OF LIPASE: CPT | Performed by: EMERGENCY MEDICINE

## 2025-07-03 PROCEDURE — 2550000001 HC RX 255 CONTRASTS: Performed by: EMERGENCY MEDICINE

## 2025-07-03 PROCEDURE — 96361 HYDRATE IV INFUSION ADD-ON: CPT

## 2025-07-03 PROCEDURE — 85025 COMPLETE CBC W/AUTO DIFF WBC: CPT | Performed by: EMERGENCY MEDICINE

## 2025-07-03 PROCEDURE — 2500000004 HC RX 250 GENERAL PHARMACY W/ HCPCS (ALT 636 FOR OP/ED): Performed by: EMERGENCY MEDICINE

## 2025-07-03 PROCEDURE — 2500000001 HC RX 250 WO HCPCS SELF ADMINISTERED DRUGS (ALT 637 FOR MEDICARE OP): Performed by: EMERGENCY MEDICINE

## 2025-07-03 PROCEDURE — 93005 ELECTROCARDIOGRAM TRACING: CPT

## 2025-07-03 PROCEDURE — 83735 ASSAY OF MAGNESIUM: CPT | Performed by: EMERGENCY MEDICINE

## 2025-07-03 PROCEDURE — 96375 TX/PRO/DX INJ NEW DRUG ADDON: CPT

## 2025-07-03 PROCEDURE — 74177 CT ABD & PELVIS W/CONTRAST: CPT | Performed by: RADIOLOGY

## 2025-07-03 PROCEDURE — 83605 ASSAY OF LACTIC ACID: CPT | Performed by: EMERGENCY MEDICINE

## 2025-07-03 PROCEDURE — 86140 C-REACTIVE PROTEIN: CPT | Performed by: EMERGENCY MEDICINE

## 2025-07-03 PROCEDURE — 84075 ASSAY ALKALINE PHOSPHATASE: CPT | Performed by: EMERGENCY MEDICINE

## 2025-07-03 PROCEDURE — 96365 THER/PROPH/DIAG IV INF INIT: CPT

## 2025-07-03 PROCEDURE — 36415 COLL VENOUS BLD VENIPUNCTURE: CPT | Performed by: EMERGENCY MEDICINE

## 2025-07-03 RX ORDER — TAMSULOSIN HYDROCHLORIDE 0.4 MG/1
0.4 CAPSULE ORAL DAILY
Qty: 15 CAPSULE | Refills: 0 | Status: SHIPPED | OUTPATIENT
Start: 2025-07-03 | End: 2025-07-18

## 2025-07-03 RX ORDER — CIPROFLOXACIN 500 MG/1
500 TABLET, FILM COATED ORAL 2 TIMES DAILY
Qty: 14 TABLET | Refills: 0 | Status: SHIPPED | OUTPATIENT
Start: 2025-07-03 | End: 2025-07-10

## 2025-07-03 RX ORDER — MORPHINE SULFATE 4 MG/ML
4 INJECTION INTRAVENOUS ONCE
Status: COMPLETED | OUTPATIENT
Start: 2025-07-03 | End: 2025-07-03

## 2025-07-03 RX ORDER — METRONIDAZOLE 500 MG/100ML
500 INJECTION, SOLUTION INTRAVENOUS EVERY 8 HOURS
Status: DISCONTINUED | OUTPATIENT
Start: 2025-07-03 | End: 2025-07-03 | Stop reason: HOSPADM

## 2025-07-03 RX ORDER — OXYCODONE HYDROCHLORIDE 5 MG/1
5 TABLET ORAL EVERY 6 HOURS PRN
Qty: 12 TABLET | Refills: 0 | Status: SHIPPED | OUTPATIENT
Start: 2025-07-03 | End: 2025-07-06

## 2025-07-03 RX ORDER — OXYCODONE HYDROCHLORIDE 5 MG/1
5 TABLET ORAL ONCE
Refills: 0 | Status: COMPLETED | OUTPATIENT
Start: 2025-07-03 | End: 2025-07-03

## 2025-07-03 RX ORDER — ONDANSETRON HYDROCHLORIDE 2 MG/ML
4 INJECTION, SOLUTION INTRAVENOUS ONCE
Status: COMPLETED | OUTPATIENT
Start: 2025-07-03 | End: 2025-07-03

## 2025-07-03 RX ORDER — SODIUM CHLORIDE 9 MG/ML
125 INJECTION, SOLUTION INTRAVENOUS CONTINUOUS
Status: DISCONTINUED | OUTPATIENT
Start: 2025-07-03 | End: 2025-07-03 | Stop reason: HOSPADM

## 2025-07-03 RX ORDER — CIPROFLOXACIN 2 MG/ML
400 INJECTION, SOLUTION INTRAVENOUS EVERY 12 HOURS
Status: DISCONTINUED | OUTPATIENT
Start: 2025-07-03 | End: 2025-07-03 | Stop reason: HOSPADM

## 2025-07-03 RX ADMIN — MORPHINE SULFATE 4 MG: 4 INJECTION INTRAVENOUS at 13:36

## 2025-07-03 RX ADMIN — MORPHINE SULFATE 4 MG: 4 INJECTION INTRAVENOUS at 16:15

## 2025-07-03 RX ADMIN — SODIUM CHLORIDE 125 ML/HR: 0.9 INJECTION, SOLUTION INTRAVENOUS at 13:17

## 2025-07-03 RX ADMIN — ONDANSETRON 4 MG: 2 INJECTION, SOLUTION INTRAMUSCULAR; INTRAVENOUS at 13:17

## 2025-07-03 RX ADMIN — IOHEXOL 75 ML: 350 INJECTION, SOLUTION INTRAVENOUS at 14:25

## 2025-07-03 RX ADMIN — OXYCODONE HYDROCHLORIDE 5 MG: 5 TABLET ORAL at 18:28

## 2025-07-03 RX ADMIN — SODIUM CHLORIDE 1000 ML: 0.9 INJECTION, SOLUTION INTRAVENOUS at 13:16

## 2025-07-03 RX ADMIN — MORPHINE SULFATE 4 MG: 4 INJECTION INTRAVENOUS at 14:34

## 2025-07-03 RX ADMIN — CIPROFLOXACIN 400 MG: 400 INJECTION, SOLUTION INTRAVENOUS at 16:42

## 2025-07-03 RX ADMIN — METRONIDAZOLE 500 MG: 500 INJECTION, SOLUTION INTRAVENOUS at 16:42

## 2025-07-03 ASSESSMENT — PAIN SCALES - GENERAL
PAINLEVEL_OUTOF10: 10 - WORST POSSIBLE PAIN

## 2025-07-03 ASSESSMENT — PAIN - FUNCTIONAL ASSESSMENT
PAIN_FUNCTIONAL_ASSESSMENT: 0-10

## 2025-07-03 ASSESSMENT — PAIN DESCRIPTION - FREQUENCY: FREQUENCY: CONSTANT/CONTINUOUS

## 2025-07-03 ASSESSMENT — LIFESTYLE VARIABLES
HAVE PEOPLE ANNOYED YOU BY CRITICIZING YOUR DRINKING: NO
EVER FELT BAD OR GUILTY ABOUT YOUR DRINKING: NO
TOTAL SCORE: 0
HAVE YOU EVER FELT YOU SHOULD CUT DOWN ON YOUR DRINKING: NO
EVER HAD A DRINK FIRST THING IN THE MORNING TO STEADY YOUR NERVES TO GET RID OF A HANGOVER: NO

## 2025-07-03 ASSESSMENT — PAIN DESCRIPTION - LOCATION
LOCATION: ABDOMEN

## 2025-07-03 ASSESSMENT — PAIN DESCRIPTION - PROGRESSION
CLINICAL_PROGRESSION: NOT CHANGED
CLINICAL_PROGRESSION: NOT CHANGED

## 2025-07-03 ASSESSMENT — PAIN DESCRIPTION - PAIN TYPE
TYPE: ACUTE PAIN
TYPE: ACUTE PAIN

## 2025-07-03 NOTE — ED TRIAGE NOTES
Pt arrived to ED via private vehicle c/o of pain after stoma reconnection surgery June 16th. Pain started yesterday, all over abdomen. Abdomen appears distended. Pt states she is constipated with minimal watery stool. Pt. States that she has had nauseas

## 2025-07-03 NOTE — ED PROVIDER NOTES
Department of Emergency Medicine   ED  Provider Note  Admit Date/RoomTime: 7/3/2025  1:00 PM  ED Room: 14/14                          History of Present Illness:    NOTE: This patient was seen and documented on by Dr. Aniceto Gonzalez in it's entirety.  Olivia Heaton CNP assisted with copying my initial chart and pasting into a new chart for documentation secondary to an [facility] IT Issue.                Barbara Sow is a 65 y.o. female presenting to the ED for abdominal pain, beginning yesterday. Patient had recent laparoscopic splenic flexure takedown/resection with colostomy closure on 6/16/25 by Dr Valencia.  The complaint has been persistent, moderate in severity, and worsened by movement.  No fever but has had chills.  She has had some small stools prior to yesterday.  Starting yesterday had increased abdominal pain.  She has pain all over her abdomen as well as mid incision.  There is been no drainage or leakage from the incision.  No significant erythema although it does feel somewhat warm to the touch.  He has had nausea but no vomiting.  Patient complains of feeling constipated with minimal watery stool.        Review of Systems:   Pertinent positives and review of systems as noted above.  Remaining 10 review of systems is negative or noncontributory to today's episode of care.           --------------------------------------------- PAST HISTORY ---------------------------------------------  Past Medical History:  has a past medical history of Anemia, Anxiety and depression, Cervical radiculopathy, Colon stricture (Multi), DDD (degenerative disc disease), lumbar, Hypothyroidism, Ileus (Multi), Large bowel obstruction (Multi), and OA (osteoarthritis).     Past Surgical History:  has a past surgical history that includes Cholecystectomy; Appendectomy; Hysterectomy; Colonoscopy (08/15/2024); and Colostomy (07/19/2024).     Social History:  reports that she quit smoking about 10 months ago. Her smoking use  included cigarettes. She started smoking about 45 years ago. She has a 89.4 pack-year smoking history. She has never used smokeless tobacco. She reports that she does not currently use alcohol. She reports current drug use. Drug: Marijuana.     Family History: family history includes Aneurysm in her sister; Heart attack in her father; No Known Problems in her mother. Unless otherwise noted, family history is non contributory          Patient's Medications   New Prescriptions     No medications on file   Previous Medications     DULOXETINE (CYMBALTA) 60 MG DR CAPSULE    Take 1 capsule (60 mg) by mouth once daily. Do not crush or chew.     GABAPENTIN (NEURONTIN) 300 MG CAPSULE    Take 6 capsules (1,800 mg) by mouth once daily at bedtime for 7 days.     GABAPENTIN (NEURONTIN) 400 MG CAPSULE    Take 3 capsules (1,200 mg) by mouth once daily in the morning for 7 days.     LEVOTHYROXINE (SYNTHROID, LEVOXYL) 100 MCG TABLET    Take 1 tablet (100 mcg) by mouth early in the morning.. Except on Sundays take 2 tabs     ONDANSETRON (ZOFRAN) 8 MG TABLET    Take 1 tablet (8 mg) by mouth every 8 hours if needed for nausea or vomiting for up to 15 doses.     ONDANSETRON ODT (ZOFRAN-ODT) 4 MG DISINTEGRATING TABLET    Dissolve 1 tablet (4 mg) in the mouth every 8 hours if needed for nausea or vomiting.     POLYETHYLENE GLYCOL (GLYCOLAX, MIRALAX) 17 GRAM/DOSE POWDER    Mix 17 g of powder and drink once daily. Mix 1 cap (17g) into 8 ounces of fluid.   Modified Medications     No medications on file   Discontinued Medications     No medications on file      The patient’s home medications have been reviewed.     Allergies: Cefaclor, Cefuroxime axetil, and Ketorolac     -------------------------------------------------- RESULTS -------------------------------------------------  All laboratory and radiology results have been personally reviewed by myself   LABS:  Labs Reviewed - No data to display        RADIOLOGY:  Interpreted by  "Radiologist.  No orders to display              Encounter Date: 06/10/25   ECG 12 Lead   Result Value     Ventricular Rate 81     Atrial Rate 81     VA Interval 156     QRS Duration 90     QT Interval 428     QTC Calculation(Bazett) 497     P Axis 66     R Axis 7     T Axis 128     QRS Count 13     Q Onset 221     P Onset 143     P Offset 193     T Offset 435     QTC Fredericia 472     Narrative     Normal sinus rhythm  Low voltage QRS  Cannot rule out Anterior infarct (cited on or before 19-JUL-2024)  Abnormal ECG  When compared with ECG of 19-JUL-2024 10:52,  Questionable change in initial forces of Anterior leads  T wave inversion less evident in Anterior leads  Confirmed by Fer Chester ([id number]) on 6/18/2025 6:01:31 PM      ------------------------- NURSING NOTES AND VITALS REVIEWED ---------------------------              The nursing notes within the ED encounter and vital signs as below have been reviewed.   /73   Pulse 90   Temp 36.7 °C (98.1 °F)   Resp 19   Ht 1.702 m (5' 7\")   Wt 57.2 kg (126 lb)   SpO2 99%   BMI 19.73 kg/m²   Oxygen Saturation Interpretation: Normal        ---------------------------------------------------PHYSICAL EXAM--------------------------------------  Physical Exam  Vitals and nursing note reviewed.   Constitutional:       General: She is not in acute distress.     Appearance: She is well-developed. She is not ill-appearing or toxic-appearing.   HENT:      Head: Normocephalic and atraumatic.      Mouth/Throat:      Mouth: Mucous membranes are moist.      Pharynx: Oropharynx is clear.   Eyes:      General: No scleral icterus.     Extraocular Movements: Extraocular movements intact.      Pupils: Pupils are equal, round, and reactive to light.   Cardiovascular:      Rate and Rhythm: Normal rate and regular rhythm.      Heart sounds: Normal heart sounds. No murmur heard.  Pulmonary:      Effort: Pulmonary effort is normal. No respiratory distress.      Breath sounds: Normal " breath sounds. No wheezing, rhonchi or rales.   Abdominal:      General: Bowel sounds are decreased. There is distension. There is no abdominal bruit. There are no signs of injury.      Palpations: Abdomen is soft. There is no shifting dullness, fluid wave, hepatomegaly, splenomegaly, mass or pulsatile mass.      Tenderness: There is generalized abdominal tenderness. There is guarding and rebound. There is no right CVA tenderness or left CVA tenderness. Negative signs include Escobedo's sign, Rovsing's sign, McBurney's sign and psoas sign.      Comments: Her midline incision is intact.  Patient has significant erythema.  It is somewhat warm to palpation.  I do not appreciate any obvious fluctuance.  It is mildly tender.  She has bowel sounds present but they are hypoactive.  She has guarding with some questionable rebound.  She does appear distended and has generalized tenderness throughout the abdomen.   Skin:     General: Skin is warm and dry.      Capillary Refill: Capillary refill takes less than 2 seconds.      Coloration: Skin is not cyanotic, jaundiced, mottled or pale.      Findings: No erythema or rash.   Neurological:      General: No focal deficit present.      Mental Status: She is alert and oriented to person, place, and time.   Psychiatric:         Mood and Affect: Mood normal.               Procedures  NONE  ------------------------------ ED COURSE/MEDICAL DECISION MAKING----------------------     Medical Decision Making:   Patient was seen and examined by me.  Started appropriate labs ordered.  Patient has abdominal pain after colostomy reversal on 6/16/2025.  Concerning for infection and/or complication and/or possible bowel obstruction.  Appropriate labs and imaging ordered.  Patient initially given Zofran 4 mg and morphine 4 mg IV.  1 L of IV fluids was given.    CBC is grossly unremarkable.  White count is normal at 8.0.  There is no significant shift on differential.  Comprehensive metabolic panel  is grossly unremarkable.  Magnesium is normal.  C-reactive protein is normal at 0.59.  Lipase is normal at 43.  Lactate is normal at 0.9.  Patient had ongoing pain and was given 4 mg of morphine IV.    CT Abdomen and Pelvis:  IMPRESSION:  1.  Normal caliber fluid-filled loops of small bowel and colon with  air-fluid levels and associated circumferential mucosal wall  thickening of near entirety of the colon suggestive of enterocolitis.  No evidence for abscess or perforation.  2. Moderate left-sided hydronephrosis and proximal hydroureter  proximal to a 3 mm obstructing proximal ureteral calculus with  associated left renal edema. Urology consult recommended.  3. Hepatomegaly and periportal edema. Recommend clinical correlation  with liver function tests.  4. Suspect left renal cyst but too small to fully characterize.  Correlation with ultrasound findings recommended.  5. Additional detailed findings as above.    Patient was discussed with the surgeon Dr Valencia at [facility], states she has looked at the CT scan.  She states that her imaging looks good that she can go home.  She does have a 3 mm obstructing proximal ureteral calculus.  We presently do not have urology on-call here at [facility].    I went to reevaluate the patient.  Shared decision making.  I discussed the findings with the patient.  The patient does not want to be admitted or transferred to another hospital.  I explained to her that she does have a kidney stone on the left side proximal ureter, 3 mm and this is probably contributing to some of her pain and distention.  I gave her the option of transfer to [facility] or elsewhere.  Patient would like to be discharged home to try to pass the kidney stone at home.  Presently I do not think she has an acute infection.  She does not have a fever.  She does not have an elevated WBC.    She is feeling somewhat better.    She would like to go home and try to pass the stone at home.    I will start her on  tamsulosin 0.4 mg tablet once a day.  Rx oxycodone 5 mg, 1 tab every 6 hours as needed moderate-severe pain.  #12  no refill  Rx Zofran 4 mg ODT 1 tablet 3 times daily as needed nausea vomiting #15  Follow-up with urology on an outpatient basis.         Counseling:              The emergency provider has spoken with the patient and discussed today’s results, in addition to providing specific details for the plan of care and counseling regarding the diagnosis and prognosis.  Questions are answered at this time and they are agreeable with the plan.        --------------------------------- IMPRESSION AND DISPOSITION ---------------------------------           IMPRESSION  No diagnosis found.     DISPOSITION  Disposition: Discharge to home  Patient condition is stable        Billing Provider Critical Care Time: 0 minutes     Aniceto Gonzalez DO  07/03/25 1806

## 2025-07-03 NOTE — ED PROVIDER NOTES
Department of Emergency Medicine   ED  Provider Note  Admit Date/RoomTime: 7/3/2025  1:00 PM  ED Room: 14/[facility]                  History of Present Illness:   Barbara Sow is a 65 y.o. female presenting to the ED for abdominal pain, beginning yesterday. Patient had recent laparoscopic splenic flexure takedown/resection with colostomy closure on 6/16/25 by Dr Valencia.  The complaint has been persistent, moderate in severity, and worsened by movement.  No fever but has had chills.  She has had some small stools prior to yesterday.  Starting yesterday had increased abdominal pain.  She has pain all over her abdomen as well as mid incision.  There is been no drainage or leakage from the incision.  No significant erythema although it does feel somewhat warm to the touch.  He has had nausea but no vomiting.  Patient complains of feeling constipated with minimal watery stool.      Review of Systems:   Pertinent positives and review of systems as noted above.  Remaining 10 review of systems is negative or noncontributory to today's episode of care.        --------------------------------------------- PAST HISTORY ---------------------------------------------  Past Medical History:  has a past medical history of Anemia, Anxiety and depression, Cervical radiculopathy, Colon stricture (Multi), DDD (degenerative disc disease), lumbar, Hypothyroidism, Ileus (Multi), Large bowel obstruction (Multi), and OA (osteoarthritis).    Past Surgical History:  has a past surgical history that includes Cholecystectomy; Appendectomy; Hysterectomy; Colonoscopy (08/15/2024); and Colostomy (07/19/2024).    Social History:  reports that she quit smoking about 10 months ago. Her smoking use included cigarettes. She started smoking about 45 years ago. She has a 89.4 pack-year smoking history. She has never used smokeless tobacco. She reports that she does not currently use alcohol. She reports current drug use. Drug: Marijuana.    Family  History: family history includes Aneurysm in her sister; Heart attack in her father; No Known Problems in her mother. Unless otherwise noted, family history is non contributory    Patient's Medications   New Prescriptions    No medications on file   Previous Medications    DULOXETINE (CYMBALTA) 60 MG DR CAPSULE    Take 1 capsule (60 mg) by mouth once daily. Do not crush or chew.    GABAPENTIN (NEURONTIN) 300 MG CAPSULE    Take 6 capsules (1,800 mg) by mouth once daily at bedtime for 7 days.    GABAPENTIN (NEURONTIN) 400 MG CAPSULE    Take 3 capsules (1,200 mg) by mouth once daily in the morning for 7 days.    LEVOTHYROXINE (SYNTHROID, LEVOXYL) 100 MCG TABLET    Take 1 tablet (100 mcg) by mouth early in the morning.. Except on Sundays take 2 tabs    ONDANSETRON (ZOFRAN) 8 MG TABLET    Take 1 tablet (8 mg) by mouth every 8 hours if needed for nausea or vomiting for up to 15 doses.    ONDANSETRON ODT (ZOFRAN-ODT) 4 MG DISINTEGRATING TABLET    Dissolve 1 tablet (4 mg) in the mouth every 8 hours if needed for nausea or vomiting.    POLYETHYLENE GLYCOL (GLYCOLAX, MIRALAX) 17 GRAM/DOSE POWDER    Mix 17 g of powder and drink once daily. Mix 1 cap (17g) into 8 ounces of fluid.   Modified Medications    No medications on file   Discontinued Medications    No medications on file      The patient’s home medications have been reviewed.    Allergies: Cefaclor, Cefuroxime axetil, and Ketorolac    -------------------------------------------------- RESULTS -------------------------------------------------  All laboratory and radiology results have been personally reviewed by myself   LABS:  Labs Reviewed - No data to display      RADIOLOGY:  Interpreted by Radiologist.  No orders to display       Encounter Date: 06/10/25   ECG 12 Lead   Result Value    Ventricular Rate 81    Atrial Rate 81    LA Interval 156    QRS Duration 90    QT Interval 428    QTC Calculation(Bazett) 497    P Axis 66    R Axis 7    T Axis 128    QRS Count 13     "Q Onset 221    P Onset 143    P Offset 193    T Offset 435    QTC Fredericia 472    Narrative    Normal sinus rhythm  Low voltage QRS  Cannot rule out Anterior infarct (cited on or before 19-JUL-2024)  Abnormal ECG  When compared with ECG of 19-JUL-2024 10:52,  Questionable change in initial forces of Anterior leads  T wave inversion less evident in Anterior leads  Confirmed by Fer Chester ([id number]) on 6/18/2025 6:01:31 PM     ------------------------- NURSING NOTES AND VITALS REVIEWED ---------------------------   The nursing notes within the ED encounter and vital signs as below have been reviewed.   /73   Pulse 90   Temp 36.7 °C (98.1 °F)   Resp 19   Ht 1.702 m (5' 7\")   Wt 57.2 kg (126 lb)   SpO2 99%   BMI 19.73 kg/m²   Oxygen Saturation Interpretation: Normal      ---------------------------------------------------PHYSICAL EXAM--------------------------------------  Physical Exam  Vitals and nursing note reviewed.   Constitutional:       General: She is not in acute distress.     Appearance: She is well-developed. She is not ill-appearing or toxic-appearing.   HENT:      Head: Normocephalic and atraumatic.      Mouth/Throat:      Mouth: Mucous membranes are moist.      Pharynx: Oropharynx is clear.   Eyes:      General: No scleral icterus.     Extraocular Movements: Extraocular movements intact.      Pupils: Pupils are equal, round, and reactive to light.   Cardiovascular:      Rate and Rhythm: Normal rate and regular rhythm.      Heart sounds: Normal heart sounds. No murmur heard.  Pulmonary:      Effort: Pulmonary effort is normal. No respiratory distress.      Breath sounds: Normal breath sounds. No wheezing, rhonchi or rales.   Abdominal:      General: Bowel sounds are decreased. There is distension. There is no abdominal bruit. There are no signs of injury.      Palpations: Abdomen is soft. There is no shifting dullness, fluid wave, hepatomegaly, splenomegaly, mass or pulsatile mass.      " Tenderness: There is generalized abdominal tenderness. There is guarding and rebound. There is no right CVA tenderness or left CVA tenderness. Negative signs include Escobedo's sign, Rovsing's sign, McBurney's sign and psoas sign.      Comments: Her midline incision is intact.  Patient has significant erythema.  It is somewhat warm to palpation.  I do not appreciate any obvious fluctuance.  It is mildly tender.  She has bowel sounds present but they are hypoactive.  She has guarding with some questionable rebound.  She does appear distended and has generalized tenderness throughout the abdomen.   Skin:     General: Skin is warm and dry.      Capillary Refill: Capillary refill takes less than 2 seconds.      Coloration: Skin is not cyanotic, jaundiced, mottled or pale.      Findings: No erythema or rash.   Neurological:      General: No focal deficit present.      Mental Status: She is alert and oriented to person, place, and time.   Psychiatric:         Mood and Affect: Mood normal.            Procedures  NONE  ------------------------------ ED COURSE/MEDICAL DECISION MAKING----------------------    Medical Decision Making:   ***        Counseling:   The emergency provider has spoken with the {Persons; family members:26752} and discussed today’s results, in addition to providing specific details for the plan of care and counseling regarding the diagnosis and prognosis.  Questions are answered at this time and they are agreeable with the plan.      --------------------------------- IMPRESSION AND DISPOSITION ---------------------------------        IMPRESSION  No diagnosis found.    DISPOSITION  Disposition: {Plan; disposition:38829}  Patient condition is {condition:40728}      Billing Provider Critical Care Time: *** minutes

## 2025-07-03 NOTE — DISCHARGE INSTRUCTIONS
Continue routine postop instructions.  Take the prescribed medications as directed.  Follow-up with urology next week.  If acutely worsening or walking to return or consider going to [facility] as we do not have urology over the weekend.  Please return if acutely worse or if you develop new worrisome symptoms.  Take Ciprofloxin twice a day  Take Tamsulosin once a day at night  Take pain medication only as directed and only as needed.  Push clear fluids and strain your urine

## 2025-07-07 ENCOUNTER — PATIENT OUTREACH (OUTPATIENT)
Dept: PRIMARY CARE | Facility: CLINIC | Age: 65
End: 2025-07-07
Payer: MEDICARE

## 2025-07-07 LAB — HOLD SPECIMEN: NORMAL

## 2025-07-12 ENCOUNTER — APPOINTMENT (OUTPATIENT)
Dept: RADIOLOGY | Facility: HOSPITAL | Age: 65
End: 2025-07-12
Payer: MEDICARE

## 2025-07-12 ENCOUNTER — HOSPITAL ENCOUNTER (EMERGENCY)
Facility: HOSPITAL | Age: 65
Discharge: HOME | End: 2025-07-12
Attending: STUDENT IN AN ORGANIZED HEALTH CARE EDUCATION/TRAINING PROGRAM
Payer: MEDICARE

## 2025-07-12 VITALS
RESPIRATION RATE: 16 BRPM | OXYGEN SATURATION: 97 % | SYSTOLIC BLOOD PRESSURE: 104 MMHG | DIASTOLIC BLOOD PRESSURE: 53 MMHG | HEIGHT: 67 IN | WEIGHT: 126 LBS | BODY MASS INDEX: 19.78 KG/M2 | HEART RATE: 57 BPM | TEMPERATURE: 98.4 F

## 2025-07-12 DIAGNOSIS — R10.9 ABDOMINAL PAIN, UNSPECIFIED ABDOMINAL LOCATION: ICD-10-CM

## 2025-07-12 DIAGNOSIS — N23 RENAL COLIC ON LEFT SIDE: ICD-10-CM

## 2025-07-12 DIAGNOSIS — R10.84 GENERALIZED ABDOMINAL PAIN: Primary | ICD-10-CM

## 2025-07-12 LAB
ALBUMIN SERPL BCP-MCNC: 4.6 G/DL (ref 3.4–5)
ALP SERPL-CCNC: 142 U/L (ref 33–136)
ALT SERPL W P-5'-P-CCNC: 27 U/L (ref 7–45)
ANION GAP BLDV CALCULATED.4IONS-SCNC: 7 MMOL/L (ref 10–25)
ANION GAP SERPL CALC-SCNC: 16 MMOL/L (ref 10–20)
APPEARANCE UR: CLEAR
AST SERPL W P-5'-P-CCNC: 29 U/L (ref 9–39)
BASE EXCESS BLDV CALC-SCNC: 2.3 MMOL/L (ref -2–3)
BASOPHILS # BLD AUTO: 0.23 X10*3/UL (ref 0–0.1)
BASOPHILS NFR BLD AUTO: 3 %
BILIRUB DIRECT SERPL-MCNC: 0.1 MG/DL (ref 0–0.3)
BILIRUB SERPL-MCNC: 0.2 MG/DL (ref 0–1.2)
BILIRUB UR STRIP.AUTO-MCNC: NEGATIVE MG/DL
BODY TEMPERATURE: 37 DEGREES CELSIUS
BUN SERPL-MCNC: 18 MG/DL (ref 6–23)
CA-I BLDV-SCNC: 1.27 MMOL/L (ref 1.1–1.33)
CALCIUM SERPL-MCNC: 10.2 MG/DL (ref 8.6–10.3)
CHLORIDE BLDV-SCNC: 104 MMOL/L (ref 98–107)
CHLORIDE SERPL-SCNC: 99 MMOL/L (ref 98–107)
CO2 SERPL-SCNC: 26 MMOL/L (ref 21–32)
COLOR UR: NORMAL
CREAT SERPL-MCNC: 0.61 MG/DL (ref 0.5–1.05)
EGFRCR SERPLBLD CKD-EPI 2021: >90 ML/MIN/1.73M*2
EOSINOPHIL # BLD AUTO: 1.36 X10*3/UL (ref 0–0.7)
EOSINOPHIL NFR BLD AUTO: 17.7 %
ERYTHROCYTE [DISTWIDTH] IN BLOOD BY AUTOMATED COUNT: 16.6 % (ref 11.5–14.5)
GLUCOSE BLDV-MCNC: 98 MG/DL (ref 74–99)
GLUCOSE SERPL-MCNC: 90 MG/DL (ref 74–99)
GLUCOSE UR STRIP.AUTO-MCNC: NORMAL MG/DL
HCO3 BLDV-SCNC: 27.8 MMOL/L (ref 22–26)
HCT VFR BLD AUTO: 34 % (ref 36–46)
HCT VFR BLD EST: 33 % (ref 36–46)
HGB BLD-MCNC: 10.7 G/DL (ref 12–16)
HGB BLDV-MCNC: 11 G/DL (ref 12–16)
IMM GRANULOCYTES # BLD AUTO: 0.06 X10*3/UL (ref 0–0.7)
IMM GRANULOCYTES NFR BLD AUTO: 0.8 % (ref 0–0.9)
INHALED O2 CONCENTRATION: 21 %
KETONES UR STRIP.AUTO-MCNC: NEGATIVE MG/DL
LACTATE BLDV-SCNC: 1.4 MMOL/L (ref 0.4–2)
LACTATE SERPL-SCNC: 1.4 MMOL/L (ref 0.4–2)
LEUKOCYTE ESTERASE UR QL STRIP.AUTO: NEGATIVE
LYMPHOCYTES # BLD AUTO: 2.18 X10*3/UL (ref 1.2–4.8)
LYMPHOCYTES NFR BLD AUTO: 28.3 %
MCH RBC QN AUTO: 29.6 PG (ref 26–34)
MCHC RBC AUTO-ENTMCNC: 31.5 G/DL (ref 32–36)
MCV RBC AUTO: 94 FL (ref 80–100)
MONOCYTES # BLD AUTO: 0.48 X10*3/UL (ref 0.1–1)
MONOCYTES NFR BLD AUTO: 6.2 %
NEUTROPHILS # BLD AUTO: 3.38 X10*3/UL (ref 1.2–7.7)
NEUTROPHILS NFR BLD AUTO: 44 %
NITRITE UR QL STRIP.AUTO: NEGATIVE
NRBC BLD-RTO: 0 /100 WBCS (ref 0–0)
OXYHGB MFR BLDV: 56.1 % (ref 45–75)
PCO2 BLDV: 46 MM HG (ref 41–51)
PH BLDV: 7.39 PH (ref 7.33–7.43)
PH UR STRIP.AUTO: 6 [PH]
PLATELET # BLD AUTO: 500 X10*3/UL (ref 150–450)
PO2 BLDV: 33 MM HG (ref 35–45)
POTASSIUM BLDV-SCNC: 5.2 MMOL/L (ref 3.5–5.3)
POTASSIUM SERPL-SCNC: 5.1 MMOL/L (ref 3.5–5.3)
PROT SERPL-MCNC: 8.5 G/DL (ref 6.4–8.2)
PROT UR STRIP.AUTO-MCNC: NEGATIVE MG/DL
RBC # BLD AUTO: 3.62 X10*6/UL (ref 4–5.2)
RBC # UR STRIP.AUTO: NEGATIVE MG/DL
SAO2 % BLDV: 61 % (ref 45–75)
SODIUM BLDV-SCNC: 134 MMOL/L (ref 136–145)
SODIUM SERPL-SCNC: 136 MMOL/L (ref 136–145)
SP GR UR STRIP.AUTO: 1.01
UROBILINOGEN UR STRIP.AUTO-MCNC: NORMAL MG/DL
WBC # BLD AUTO: 7.7 X10*3/UL (ref 4.4–11.3)

## 2025-07-12 PROCEDURE — 82435 ASSAY OF BLOOD CHLORIDE: CPT

## 2025-07-12 PROCEDURE — 96374 THER/PROPH/DIAG INJ IV PUSH: CPT

## 2025-07-12 PROCEDURE — 83605 ASSAY OF LACTIC ACID: CPT

## 2025-07-12 PROCEDURE — 74176 CT ABD & PELVIS W/O CONTRAST: CPT | Performed by: STUDENT IN AN ORGANIZED HEALTH CARE EDUCATION/TRAINING PROGRAM

## 2025-07-12 PROCEDURE — 74176 CT ABD & PELVIS W/O CONTRAST: CPT

## 2025-07-12 PROCEDURE — 81003 URINALYSIS AUTO W/O SCOPE: CPT

## 2025-07-12 PROCEDURE — 2500000004 HC RX 250 GENERAL PHARMACY W/ HCPCS (ALT 636 FOR OP/ED): Mod: JZ

## 2025-07-12 PROCEDURE — 96375 TX/PRO/DX INJ NEW DRUG ADDON: CPT

## 2025-07-12 PROCEDURE — 82248 BILIRUBIN DIRECT: CPT

## 2025-07-12 PROCEDURE — 99284 EMERGENCY DEPT VISIT MOD MDM: CPT | Mod: 25 | Performed by: STUDENT IN AN ORGANIZED HEALTH CARE EDUCATION/TRAINING PROGRAM

## 2025-07-12 PROCEDURE — 85025 COMPLETE CBC W/AUTO DIFF WBC: CPT

## 2025-07-12 PROCEDURE — 84132 ASSAY OF SERUM POTASSIUM: CPT | Mod: 59

## 2025-07-12 PROCEDURE — 36415 COLL VENOUS BLD VENIPUNCTURE: CPT

## 2025-07-12 PROCEDURE — 84132 ASSAY OF SERUM POTASSIUM: CPT

## 2025-07-12 RX ORDER — OXYCODONE HYDROCHLORIDE 5 MG/1
5 TABLET ORAL EVERY 6 HOURS PRN
Qty: 12 TABLET | Refills: 0 | Status: SHIPPED | OUTPATIENT
Start: 2025-07-12 | End: 2025-07-15

## 2025-07-12 RX ORDER — MORPHINE SULFATE 4 MG/ML
4 INJECTION INTRAVENOUS ONCE
Status: COMPLETED | OUTPATIENT
Start: 2025-07-12 | End: 2025-07-12

## 2025-07-12 RX ORDER — ACETAMINOPHEN 500 MG
1000 TABLET ORAL EVERY 8 HOURS PRN
Qty: 40 TABLET | Refills: 0 | Status: SHIPPED | OUTPATIENT
Start: 2025-07-12 | End: 2025-07-19

## 2025-07-12 RX ORDER — ONDANSETRON HYDROCHLORIDE 2 MG/ML
4 INJECTION, SOLUTION INTRAVENOUS ONCE
Status: COMPLETED | OUTPATIENT
Start: 2025-07-12 | End: 2025-07-12

## 2025-07-12 RX ORDER — ONDANSETRON 4 MG/1
4 TABLET, ORALLY DISINTEGRATING ORAL EVERY 8 HOURS PRN
Qty: 20 TABLET | Refills: 0 | Status: SHIPPED | OUTPATIENT
Start: 2025-07-12 | End: 2025-07-19

## 2025-07-12 RX ORDER — HYDROMORPHONE HYDROCHLORIDE 1 MG/ML
1 INJECTION, SOLUTION INTRAMUSCULAR; INTRAVENOUS; SUBCUTANEOUS ONCE
Status: COMPLETED | OUTPATIENT
Start: 2025-07-12 | End: 2025-07-12

## 2025-07-12 RX ADMIN — ONDANSETRON 4 MG: 2 INJECTION, SOLUTION INTRAMUSCULAR; INTRAVENOUS at 11:02

## 2025-07-12 RX ADMIN — MORPHINE SULFATE 4 MG: 4 INJECTION INTRAVENOUS at 11:03

## 2025-07-12 RX ADMIN — HYDROMORPHONE HYDROCHLORIDE 1 MG: 1 INJECTION, SOLUTION INTRAMUSCULAR; INTRAVENOUS; SUBCUTANEOUS at 12:15

## 2025-07-12 ASSESSMENT — PAIN DESCRIPTION - PAIN TYPE: TYPE: ACUTE PAIN

## 2025-07-12 ASSESSMENT — LIFESTYLE VARIABLES
HAVE YOU EVER FELT YOU SHOULD CUT DOWN ON YOUR DRINKING: NO
TOTAL SCORE: 0
EVER FELT BAD OR GUILTY ABOUT YOUR DRINKING: NO
EVER HAD A DRINK FIRST THING IN THE MORNING TO STEADY YOUR NERVES TO GET RID OF A HANGOVER: NO
HAVE PEOPLE ANNOYED YOU BY CRITICIZING YOUR DRINKING: NO

## 2025-07-12 ASSESSMENT — PAIN DESCRIPTION - LOCATION: LOCATION: ABDOMEN

## 2025-07-12 ASSESSMENT — PAIN SCALES - GENERAL
PAINLEVEL_OUTOF10: 10 - WORST POSSIBLE PAIN
PAINLEVEL_OUTOF10: 7
PAINLEVEL_OUTOF10: 9

## 2025-07-12 ASSESSMENT — PAIN - FUNCTIONAL ASSESSMENT: PAIN_FUNCTIONAL_ASSESSMENT: 0-10

## 2025-07-12 NOTE — ED PROVIDER NOTES
Chief Complaint   Patient presents with    Abdominal Pain     Was here on the 3rd and was diagnosed with a kidney stone. Same pain is back again today but in the center of her abdomen. Recent stoma surgery reversal.        Barbara Sow is a 65 y.o. female presenting to the ED for left-sided abdominal pain, that has been persistent since 7/2. Patient had recent laparoscopic splenic flexure takedown/resection with colostomy closure on 6/16/25 by Dr Valencia.  Since that time the patient was seen in the ER on 7/3 diagnosed with a ureteral stone with hydronephrosis, the patient attempted to follow with Dr. Fay and is not able to have an appointment for multiple months, the patient states that her nausea and pain medications have run out, and her pain is uncontrollable at home and a 10 out of 10.  Patient is urinating without difficulty, the patient is having small bowel movements, patient is hemodynamically stable upon arrival.      History provided by:  Patient   used: No         PmHx, PsHx, Allergies, Family Hx, social Hx reviewed as documented    A complete 10 point review of systems was performed and is negative except for as mentioned in the HPI.    Physical Exam:    General: Patient is AAOx3, appears well developed, well nourished, is a good historian, answers questions appropriately    HEENT: head normocephalic, atraumatic, PERRLA, EOMs intact, oropharynx without erythema or exudate, buccal mucosa intact without lesions, TMs unremarkable, nose is patent bilateral    Neck: supple, full ROM, negative for lymphadenopathy, JVD, thyromegaly, tracheal deviation, nuccal rigidity    Pulmonary: CTAB, no accessory muscle use, able to speak full clear sentences    Cardiac: HRRR, no murmurs, rubs or gallops    GI: Chronically distended, healed midline surgical scar, diffuse left abdominal pain worse with palpation not going into her flank with left-sided guarding     Musculoskeletal: full weight  bearing, LEVINE, no joint effusions, clubbing or edema noted    Skin: intact, no lesions or rashes noted, turgor is good.    Neuro: patient follow commands, cranial nerves 2-12 grossly intact, motor strengths 5/5 upper and lower extremities, DTR's and sensation are symmetrical. No focal deficits.    Rectal/: No urinary burning, urgency, change in frequency.  Patient has no rectal complaints        Medical Decision Making  This patient was seen, treated, and evaluated in conjunction with Dr. Topher Malcolm    Primary consideration for the patient given her presentation would be a worsening presentation of the known ureteral stone on the left, urosepsis although the patient's vital signs are not indicative of this, urinary tract infection, postsurgical complications though CT scan of 7/3 did not show any, or other causative pathology.  Repeat diagnostic blood work, urinalysis, CT abdomen pelvis without IV contrast will be used to further evaluate.  Patient given 4 mg of IV morphine, 4 mg of IV Zofran    Patient's diagnostic blood work shows hemoconcentration, no other acute abnormalities appreciated when compared to previous studies.    The patient CT scan shows no sign of hydronephrosis or ureteral stone that was seen on 7/3, improvement shown in ascites.    The patient's pain is managed with the medications given here in the emergency department, the patient has a follow-up with her primary care doctor as well as her surgeon on Monday of next week and is requesting discharge.  Patient given Roxicodone for breakthrough pain, Zofran, and acetaminophen.     Patient is amenable to the plan of discharge as outlined above, all patient's questions pertaining to their ED course were answered in their entirety.  Strict return precautions were discussed with the patient and they verbalized understanding.  Further, it was made clear to the patient that from an emergent basis, all effort and testing was done to eliminate any  "imminent dangerous or potentially dangerous conditions of the patient however if their symptoms get much worse or feel life-threatening, they are to return to the emergency department or call 911 immediately.    Amount and/or Complexity of Data Reviewed  Labs: ordered. Decision-making details documented in ED Course.  Radiology: ordered. Decision-making details documented in ED Course.       Diagnoses as of 07/12/25 1414   Generalized abdominal pain       The patient has had the following imaging during this ER visit: CT ABDOMEN PELVIS WO IV CONTRAST     Patient History   Medical History[1]  Surgical History[2]  Family History[3]  Social History[4]    ED Triage Vitals [07/12/25 1002]   Temperature Heart Rate Respirations BP   36.9 °C (98.4 °F) 89 18 129/70      Pulse Ox Temp Source Heart Rate Source Patient Position   98 % Temporal Monitor Sitting      BP Location FiO2 (%)     Left arm --       Vitals:    07/12/25 1002 07/12/25 1214   BP: 129/70 110/66   BP Location: Left arm    Patient Position: Sitting    Pulse: 89 69   Resp: 18 16   Temp: 36.9 °C (98.4 °F)    TempSrc: Temporal    SpO2: 98% 95%   Weight: 57.2 kg (126 lb)    Height: 1.702 m (5' 7\")                Zeus Cramer, APRN-CNP  07/12/25 1359         [1]   Past Medical History:  Diagnosis Date    Anemia     3.8.25: H/H 10.9/35.3    Anxiety and depression     Cervical radiculopathy     Colon stricture (Multi)     Plan: Laparoscopic Takedown of Colostomy; Splenic Flexure Resection with Anastomosis; Closure Colostomy 12/9/24    DDD (degenerative disc disease), lumbar     Hypothyroidism     Ileus (Multi)     Large bowel obstruction (Multi)     s/p Exploration Laparotomy, transverse colostomy 7/19/24    OA (osteoarthritis)    [2]   Past Surgical History:  Procedure Laterality Date    APPENDECTOMY      CHOLECYSTECTOMY      COLONOSCOPY  08/15/2024    COLOSTOMY  07/19/2024    Exploration Laparotomy, transverse colostomy    HYSTERECTOMY     [3]   Family " History  Problem Relation Name Age of Onset    No Known Problems Mother      Heart attack Father      Aneurysm Sister     [4]   Social History  Tobacco Use    Smoking status: Former     Current packs/day: 0.25     Average packs/day: 2.0 packs/day for 45.1 years (89.4 ttl pk-yrs)     Types: Cigarettes     Start date: 1980     Quit date: 09/2024    Smokeless tobacco: Never   Vaping Use    Vaping status: Never Used   Substance Use Topics    Alcohol use: Not Currently    Drug use: Yes     Types: Marijuana     Comment: occasionally        Zeus Cramer, TERESO-BORIS  07/12/25 0294

## 2025-07-21 LAB
ATRIAL RATE: 65 BPM
P AXIS: 73 DEGREES
PR INTERVAL: 172 MS
Q ONSET: 249 MS
QRS COUNT: 11 BEATS
QRS DURATION: 99 MS
QT INTERVAL: 435 MS
QTC CALCULATION(BAZETT): 460 MS
QTC FREDERICIA: 451 MS
R AXIS: 3 DEGREES
T AXIS: 73 DEGREES
T OFFSET: 467 MS
VENTRICULAR RATE: 67 BPM

## 2025-08-06 DIAGNOSIS — Z12.31 ENCOUNTER FOR SCREENING MAMMOGRAM FOR BREAST CANCER: ICD-10-CM

## (undated) DEVICE — SUTURE, VICRYL PLUS 3-0, SH, 27IN

## (undated) DEVICE — NERVE BLOCK, STIMUQUIK, SINGLE-SHOT, 21GA X 3-1/2

## (undated) DEVICE — SUTURE, PDS II, 0 36 IN, CT-1, VIOLET

## (undated) DEVICE — DRESSING, GAUZE, SPONGE, VERSALON, ALL PURPOSE, 4 X 4 IN, SOFT

## (undated) DEVICE — SLEEVE, SUCTION, E-SEP, 165MM

## (undated) DEVICE — SOLUTION, IRRIGATION, SODIUM CHLORIDE 0.9%, 1000 ML, POUR BOTTLE

## (undated) DEVICE — Device

## (undated) DEVICE — SUTURE, SILK, 3-0, 30 IN, BR SH, BLACK

## (undated) DEVICE — CATHETER, URETHRAL, FOLEY, 2 WAY, BARDEX IC, 12 FR, 5 CC, SILVER, LATEX

## (undated) DEVICE — GLOVE, SURGICAL, PROTEXIS PI BLUE W/NEUTHERA, 6.5, PF, LF

## (undated) DEVICE — STAPLER, SKIN, MULTIFIRE, PREMIUM, WIDE, 35 W

## (undated) DEVICE — PAD, GROUNDING, ELECTROSURGICAL, W/9 FT CABLE, POLYHESIVE II, ADULT, LF

## (undated) DEVICE — LIGASURE, V SEALER/DIVIDER  5MM BLUNT TIP

## (undated) DEVICE — SUTURE, PROLENE, 0, 30 IN, SH

## (undated) DEVICE — ELECTRODE, ELECTROSURGICAL, BLADE, EXTENDED

## (undated) DEVICE — TUBING, SUCTION, NON-CONDUCTIVE, W/CONNECT,.25 IN X 12 FT, STERILE, LF

## (undated) DEVICE — GOWN, ASTOUND, XL

## (undated) DEVICE — SUTURE, PDS II, 0, 36 IN, CT, VIOLET

## (undated) DEVICE — DRAPE, SHEET, UTILITY, NON ABSORBENT, 18 X 26 IN, LF

## (undated) DEVICE — SUTURE, MONOCRYL, 4-0, 18 IN, PS2, UNDYED

## (undated) DEVICE — SEAL, SCOPE WARMER DISPOSABLE

## (undated) DEVICE — TUBE SET, PNEUMOCLEAR, SMOKE EVACU, HIGH-FLOW

## (undated) DEVICE — COVER HANDLE LIGHT, STERIS, BLUE, STERILE

## (undated) DEVICE — DRESSING, NON-ADHERENT, TELFA, OUCHLESS, 3 X 8 IN, STERILE

## (undated) DEVICE — SUTURE, SILK, 3-0, 30 IN, MULTIPACK, BLACK

## (undated) DEVICE — TRAY, SURESTEP, URINE METER, 16FR, COMPLETE, W/STATLOCK

## (undated) DEVICE — DEVICE, GELPOINT, SINGLE PORT

## (undated) DEVICE — SUTURE, ETHIBOND XTRA, 3-0, 30 IN, SH

## (undated) DEVICE — SYRINGE, 10 CC, LUER LOCK

## (undated) DEVICE — SUTURE, SILK, 2-0, 30 IN, SH, BLACK

## (undated) DEVICE — DRAPE, SHEET, ENDOSCOPY, GENERAL, FENESTRATED, ARMBOARD COVER, 98 X 123.5 IN, DISPOSABLE, LF, STERILE

## (undated) DEVICE — GOWN, SURGICAL, SMARTGOWN, XLARGE, STERILE

## (undated) DEVICE — POSITIONING KIT, PAGAZZI, PINK PAD XL, W/ ARM AND HEAD REST

## (undated) DEVICE — SUTURE, SILK, 0, 6-30 LABYRINTH

## (undated) DEVICE — GOWN, SURGICAL, SMARTGOWN, LARGE, STERILE

## (undated) DEVICE — GLOVE, SURGICAL, PROTEXIS PI MICRO, 6.5, PF, LF

## (undated) DEVICE — COUNTER, NEEDLE, FOAM BLOCK, POP-N-COUNT, W/BLADEGUARD, W/ADHESIVE 40 COUNT, RED

## (undated) DEVICE — TOWEL, SURGICAL, NEURO, O/R, 16 X 26, BLUE, STERILE

## (undated) DEVICE — TRAY, FOLEY, LUBRI-SIL, 16FR, COMPLETE CARE W/STATLOCK

## (undated) DEVICE — APPLICATOR, CHLORAPREP, W/ORANGE TINT, 26ML

## (undated) DEVICE — SUTURE, SILK, 2-0, TIES, 12-30 IN, BLACK

## (undated) DEVICE — SPONGE, LAP, XRAY DECT, 18IN X 18IN, W/LOOP, STERILE

## (undated) DEVICE — SUTURE, PDSII, 1, TP-1, VIL, MONO, 48LP

## (undated) DEVICE — DRAPE, SHEET, THREE QUARTER, FAN FOLD, 57 X 77 IN